# Patient Record
Sex: FEMALE | Race: WHITE | NOT HISPANIC OR LATINO | Employment: FULL TIME | URBAN - METROPOLITAN AREA
[De-identification: names, ages, dates, MRNs, and addresses within clinical notes are randomized per-mention and may not be internally consistent; named-entity substitution may affect disease eponyms.]

---

## 2017-02-15 ENCOUNTER — MYC MEDICAL ADVICE (OUTPATIENT)
Dept: FAMILY MEDICINE | Facility: CLINIC | Age: 57
End: 2017-02-15

## 2017-02-15 DIAGNOSIS — L40.0 PLAQUE PSORIASIS: ICD-10-CM

## 2017-02-15 NOTE — TELEPHONE ENCOUNTER
desonide (DESOWEN) 0.05 % ointment      Last Written Prescription Date: 6/1/2016  Last Fill Quantity: 60 g,  # refills: 3   Last Office Visit with G, P or [facility] prescribing provider: 8/16/2016                                             Clarifying with patient on medication request via mychart - per no medication by name requested    Prakash St RN

## 2017-02-15 NOTE — TELEPHONE ENCOUNTER
Routing refill request to provider for review/approval because:  -Medication strength not on [facility] refill protocol    Routing to PCP.    Dr. Rodriguez-Please sign if agree.    Thank you!  LORRAINE LuiN, RN

## 2017-02-16 RX ORDER — DESONIDE 0.5 MG/G
OINTMENT TOPICAL 2 TIMES DAILY
Qty: 60 G | Refills: 3 | Status: SHIPPED | OUTPATIENT
Start: 2017-02-16 | End: 2019-05-21

## 2017-02-20 ENCOUNTER — MYC MEDICAL ADVICE (OUTPATIENT)
Dept: FAMILY MEDICINE | Facility: CLINIC | Age: 57
End: 2017-02-20

## 2017-02-20 DIAGNOSIS — H93.90 EAR PROBLEM, UNSPECIFIED LATERALITY: Primary | ICD-10-CM

## 2017-02-20 NOTE — TELEPHONE ENCOUNTER
Dr Rodriguez would you want to see patient to direct care?  If it might be psoriasis, should she see derm rather than ENT?  Lori Bolaños RN

## 2017-02-27 DIAGNOSIS — E87.6 HYPOPOTASSEMIA: ICD-10-CM

## 2017-02-27 DIAGNOSIS — I10 HYPERTENSION GOAL BP (BLOOD PRESSURE) < 140/90: ICD-10-CM

## 2017-03-01 RX ORDER — METOPROLOL TARTRATE 50 MG
TABLET ORAL
Qty: 180 TABLET | Refills: 0 | Status: SHIPPED | OUTPATIENT
Start: 2017-03-01 | End: 2017-05-31

## 2017-03-01 RX ORDER — LISINOPRIL 40 MG/1
TABLET ORAL
Qty: 90 TABLET | Refills: 0 | Status: SHIPPED | OUTPATIENT
Start: 2017-03-01 | End: 2017-05-31

## 2017-03-01 RX ORDER — TRIAMTERENE AND HYDROCHLOROTHIAZIDE 37.5; 25 MG/1; MG/1
CAPSULE ORAL
Qty: 180 CAPSULE | Refills: 0 | Status: SHIPPED | OUTPATIENT
Start: 2017-03-01 | End: 2017-05-31

## 2017-03-01 NOTE — TELEPHONE ENCOUNTER
Prescription approved per [facility] Refill Protocol.  LORRAINE LuiN, RN      metoprolol (LOPRESSOR) 50 MG tablet      Last Written Prescription Date: 2/8/16  Last Fill Quantity: 180, # refills: 3    Last Office Visit with [facility] or [facility] prescribing provider:  8/16/16 with Dr. Wegener   Future Office Visit:        BP Readings from Last 3 Encounters:   08/16/16 134/74   05/02/16 106/71   02/08/16 124/80     lisinopril (PRINIVIL,ZESTRIL) 40 MG tablet/ triamterene-hydrochlorothiazide (DYAZIDE) 37.5-25 MG per capsule  Last Written Prescription Date: 2/8/16  Last Fill Quantity: 90/180, # refills: 3  Last Office Visit with [facility] or [facility] prescribing provider: 8/16/16       Potassium   Date Value Ref Range Status   08/16/2016 3.5 3.4 - 5.3 mmol/L Final     Creatinine   Date Value Ref Range Status   08/16/2016 0.76 0.52 - 1.04 mg/dL Final     BP Readings from Last 3 Encounters:   08/16/16 134/74   05/02/16 106/71   02/08/16 124/80

## 2017-03-24 DIAGNOSIS — Z13.6 CARDIOVASCULAR SCREENING; LDL GOAL LESS THAN 100: ICD-10-CM

## 2017-03-24 NOTE — TELEPHONE ENCOUNTER
Medication Detail      Disp Refills Start End LAY   simvastatin (ZOCOR) 40 MG tablet 90 tablet 3 2/8/2016  No   Sig: Take 1 tablet (40 mg) by mouth At Bedtime   Class: E-Prescribe   Route: Oral       Last Office Visit with [facility], P or [facility] prescribing provider: 8/16/2016       Lab Results   Component Value Date    CHOL 238 11/19/2015     Lab Results   Component Value Date    HDL 83 11/19/2015     Lab Results   Component Value Date     11/19/2015     Lab Results   Component Value Date    TRIG 178 11/19/2015     Lab Results   Component Value Date    CHOLHDLRATIO 3.3 05/15/2014

## 2017-03-27 RX ORDER — SIMVASTATIN 40 MG
TABLET ORAL
Qty: 90 TABLET | Refills: 0 | Status: SHIPPED | OUTPATIENT
Start: 2017-03-27 | End: 2017-06-20

## 2017-03-27 NOTE — TELEPHONE ENCOUNTER
Routing refill request to provider for review/approval because:  --Labs not current:  Last lipids 11/19/15.  --MyChart reminder sent to patient today.    Colette Robb RN

## 2017-04-13 DIAGNOSIS — Z13.6 CARDIOVASCULAR SCREENING; LDL GOAL LESS THAN 100: ICD-10-CM

## 2017-04-13 PROCEDURE — 36415 COLL VENOUS BLD VENIPUNCTURE: CPT | Performed by: FAMILY MEDICINE

## 2017-04-13 PROCEDURE — 80061 LIPID PANEL: CPT | Performed by: FAMILY MEDICINE

## 2017-04-14 LAB
CHOLEST SERPL-MCNC: 229 MG/DL
HDLC SERPL-MCNC: 80 MG/DL
LDLC SERPL CALC-MCNC: 118 MG/DL
NONHDLC SERPL-MCNC: 149 MG/DL
TRIGL SERPL-MCNC: 154 MG/DL

## 2017-04-20 NOTE — PROGRESS NOTES
The results of your recent lipid (cholesterol) profile were abnormal.    Lab Results       Component                Value               Date                       LDL                      118                 04/13/2017            Lab Results       Component                Value               Date                       TRIG                     154                 04/13/2017            Lab Results       Component                Value               Date                       CHOLHDLRATIO             3.3                 05/15/2014              Desired or goal levels are:  CHOLESTEROL: Desirable is less than 200.   HDL (Good Cholesterol): Desirable is greater than 40 (for men) greater than 50 (for women).  LDL (Bad Cholesterol): Desirable is less than 130 (or less than 100 if you have heart disease or diabetes). Borderline 130-160.  TRIGLYCERIDES: Desirable is less than 150.  Borderline is 150-200.    For high triglycerides, reduce the intake of jam, jelly, honey, alcohol, and/or coffee creamers.    As you may know, an elevated cholesterol is one factor that increases your risk for heart disease and stroke. You can improve your cholesterol by controlling the amount and type of fat you eat and by increasing your daily activity level.    Here are some ways to improve your nutrition:  Eat less fat (especially butter, Crisco and other saturated fats)  Buy lean cuts of meat, reduce your portions of red meat or substitute poultry or fish  Use skim milk and low-fat dairy products  Eat no more than 4 egg yolks per week  Avoid fried or fast foods that are high in fat  Eat more fruits and vegetables      Also consider starting or increasing your aerobic activity. Aerobic activity is the best way to improve HDL (good) cholesterol. If this would be new to you, please talk with me first about what activities are safe for you.      Please feel free to contact us with any questions or if you would like more information.      Stacia  LAZARUS Rodriguez

## 2017-06-20 ENCOUNTER — OFFICE VISIT (OUTPATIENT)
Dept: FAMILY MEDICINE | Facility: CLINIC | Age: 57
End: 2017-06-20
Payer: COMMERCIAL

## 2017-06-20 VITALS
DIASTOLIC BLOOD PRESSURE: 62 MMHG | SYSTOLIC BLOOD PRESSURE: 106 MMHG | BODY MASS INDEX: 32.6 KG/M2 | RESPIRATION RATE: 16 BRPM | HEIGHT: 63 IN | HEART RATE: 82 BPM | WEIGHT: 184 LBS | OXYGEN SATURATION: 98 % | TEMPERATURE: 98.4 F

## 2017-06-20 DIAGNOSIS — F43.23 ADJUSTMENT DISORDER WITH MIXED ANXIETY AND DEPRESSED MOOD: ICD-10-CM

## 2017-06-20 DIAGNOSIS — F42.9 OCD (OBSESSIVE COMPULSIVE DISORDER): ICD-10-CM

## 2017-06-20 DIAGNOSIS — E78.00 HYPERCHOLESTEREMIA: ICD-10-CM

## 2017-06-20 DIAGNOSIS — I10 HYPERTENSION GOAL BP (BLOOD PRESSURE) < 140/90: Primary | ICD-10-CM

## 2017-06-20 DIAGNOSIS — E87.6 HYPOPOTASSEMIA: ICD-10-CM

## 2017-06-20 DIAGNOSIS — Z12.11 COLON CANCER SCREENING: ICD-10-CM

## 2017-06-20 PROCEDURE — 80048 BASIC METABOLIC PNL TOTAL CA: CPT | Performed by: FAMILY MEDICINE

## 2017-06-20 PROCEDURE — 99214 OFFICE O/P EST MOD 30 MIN: CPT | Performed by: FAMILY MEDICINE

## 2017-06-20 PROCEDURE — 36415 COLL VENOUS BLD VENIPUNCTURE: CPT | Performed by: FAMILY MEDICINE

## 2017-06-20 RX ORDER — BUPROPION HYDROCHLORIDE 100 MG/1
TABLET ORAL DAILY
Qty: 90 TABLET | Status: CANCELLED | OUTPATIENT
Start: 2017-06-20

## 2017-06-20 RX ORDER — SIMVASTATIN 40 MG
TABLET ORAL
Qty: 90 TABLET | Refills: 3 | Status: SHIPPED | OUTPATIENT
Start: 2017-06-20 | End: 2017-06-20

## 2017-06-20 RX ORDER — TRIAMTERENE AND HYDROCHLOROTHIAZIDE 37.5; 25 MG/1; MG/1
CAPSULE ORAL
Qty: 180 CAPSULE | Refills: 3 | Status: SHIPPED | OUTPATIENT
Start: 2017-06-20 | End: 2018-03-22

## 2017-06-20 RX ORDER — LISINOPRIL 40 MG/1
40 TABLET ORAL DAILY
Qty: 90 TABLET | Refills: 3 | Status: SHIPPED | OUTPATIENT
Start: 2017-06-20 | End: 2018-03-22

## 2017-06-20 RX ORDER — METOPROLOL TARTRATE 50 MG
50 TABLET ORAL 2 TIMES DAILY
Qty: 90 TABLET | Refills: 3 | Status: SHIPPED | OUTPATIENT
Start: 2017-06-20 | End: 2018-01-01

## 2017-06-20 RX ORDER — BUPROPION HYDROCHLORIDE 150 MG/1
TABLET ORAL
Qty: 30 TABLET | Status: CANCELLED | OUTPATIENT
Start: 2017-06-20

## 2017-06-20 ASSESSMENT — ANXIETY QUESTIONNAIRES
IF YOU CHECKED OFF ANY PROBLEMS ON THIS QUESTIONNAIRE, HOW DIFFICULT HAVE THESE PROBLEMS MADE IT FOR YOU TO DO YOUR WORK, TAKE CARE OF THINGS AT HOME, OR GET ALONG WITH OTHER PEOPLE: SOMEWHAT DIFFICULT
1. FEELING NERVOUS, ANXIOUS, OR ON EDGE: NEARLY EVERY DAY
7. FEELING AFRAID AS IF SOMETHING AWFUL MIGHT HAPPEN: NOT AT ALL
5. BEING SO RESTLESS THAT IT IS HARD TO SIT STILL: SEVERAL DAYS
2. NOT BEING ABLE TO STOP OR CONTROL WORRYING: MORE THAN HALF THE DAYS
6. BECOMING EASILY ANNOYED OR IRRITABLE: MORE THAN HALF THE DAYS
3. WORRYING TOO MUCH ABOUT DIFFERENT THINGS: SEVERAL DAYS
GAD7 TOTAL SCORE: 10

## 2017-06-20 ASSESSMENT — PATIENT HEALTH QUESTIONNAIRE - PHQ9: 5. POOR APPETITE OR OVEREATING: SEVERAL DAYS

## 2017-06-20 NOTE — MR AVS SNAPSHOT
After Visit Summary   6/20/2017    Maggy Goldberg    MRN: [id number]           Patient Information     Date Of Birth          1960        Visit Information        Provider Department      6/20/2017 1:00 PM Stacia Rodriguez MD [facility]        Today's Diagnoses     Hypertension goal BP (blood pressure) < 140/90    -  1    Adjustment disorder with mixed anxiety and depressed mood        OCD (obsessive compulsive disorder)        Hypopotassemia        Colon cancer screening        Hypercholesteremia          Care Instructions    1. Schedule your mammogram.  2. Complete your FIT test.  3. You can try stopping your simvastatin and return in 3 months for a fasting lipid test.               Follow-ups after your visit        Future tests that were ordered for you today     Open Future Orders        Priority Expected Expires Ordered    Lipid panel reflex to direct LDL Routine 6/21/2017 6/20/2018 6/20/2017    Fecal colorectal cancer screen FIT Routine 7/11/2017 9/12/2017 6/20/2017            Who to contact     If you have questions or need follow up information about today's clinic visit or your schedule please contact [facility] directly at [phone].  Normal or non-critical lab and imaging results will be communicated to you by Dexterrahart, letter or phone within 4 business days after the clinic has received the results. If you do not hear from us within 7 days, please contact the clinic through [email or web address] or phone. If you have a critical or abnormal lab result, we will notify you by phone as soon as possible.  Submit refill requests through Nimaya or call your pharmacy and they will forward the refill request to us. Please allow 3 business days for your refill to be completed.          Additional Information About Your Visit        Dexterrahart Information     Nimaya gives you secure access to your electronic health record. If you see a primary care provider, you can also send  "messages to your care team and make appointments. If you have questions, please call your primary care clinic.  If you do not have a primary care provider, please call [phone] and they will assist you.        Care EveryWhere ID     This is your Care EveryWhere ID. This could be used by other organizations to access your [facility] medical records  [id number]        Your Vitals Were     Pulse Temperature Respirations Height Last Period Pulse Oximetry    82 98.4  F (36.9  C) (Oral) 16 5' 3\" (1.6 m) 10/19/2010 98%    BMI (Body Mass Index)                   32.59 kg/m2            Blood Pressure from Last 3 Encounters:   06/20/17 106/62   08/16/16 134/74   05/02/16 106/71    Weight from Last 3 Encounters:   06/20/17 184 lb (83.5 kg)   08/16/16 197 lb (89.4 kg)   05/02/16 199 lb (90.3 kg)              We Performed the Following     Basic metabolic panel  (Ca, Cl, CO2, Creat, Gluc, K, Na, BUN)          Today's Medication Changes          These changes are accurate as of: 6/20/17  1:35 PM.  If you have any questions, ask your nurse or doctor.               These medicines have changed or have updated prescriptions.        Dose/Directions    lisinopril 40 MG tablet   Commonly known as:  PRINIVIL/ZESTRIL   This may have changed:  See the new instructions.   Used for:  Hypertension goal BP (blood pressure) < 140/90   Changed by:  Stacia Rodriguez MD        Dose:  40 mg   Take 1 tablet (40 mg) by mouth daily   Quantity:  90 tablet   Refills:  3       metoprolol 50 MG tablet   Commonly known as:  LOPRESSOR   This may have changed:  See the new instructions.   Used for:  Hypertension goal BP (blood pressure) < 140/90   Changed by:  Stacia Rodriguez MD        Dose:  50 mg   Take 1 tablet (50 mg) by mouth 2 times daily   Quantity:  90 tablet   Refills:  3       triamterene-hydrochlorothiazide 37.5-25 MG per capsule   Commonly known as:  DYAZIDE   This may have changed:  See the new instructions.   Used for:  Hypertension goal " BP (blood pressure) < 140/90, Hypopotassemia   Changed by:  Stacia Rodriguez MD        TAKE TWO CAPSULES BY MOUTH EVERY DAY   Quantity:  180 capsule   Refills:  3         Stop taking these medicines if you haven't already. Please contact your care team if you have questions.     simvastatin 40 MG tablet   Commonly known as:  ZOCOR   Stopped by:  Stacia Rodriguez MD                Where to get your medicines      These medications were sent to [facility] [address]  [address] [facility] [address]     Phone:  [phone]     lisinopril 40 MG tablet    metoprolol 50 MG tablet    triamterene-hydrochlorothiazide 37.5-25 MG per capsule                Primary Care Provider Office Phone # Fax #    Stacia Rodriguez [phone]       [facility] [address]  [facility] [address]        Thank you!     Thank you for choosing [facility]  for your care. Our goal is always to provide you with excellent care. Hearing back from our patients is one way we can continue to improve our services. Please take a few minutes to complete the written survey that you may receive in the mail after your visit with us. Thank you!             Your Updated Medication List - Protect others around you: Learn how to safely use, store and throw away your medicines at www.disposemymeds.org.          This list is accurate as of: 6/20/17  1:35 PM.  Always use your most recent med list.                   Brand Name Dispense Instructions for use    aspirin 325 MG tablet      one daily       blood glucose monitoring lancets     100 each    1 lancet daily.       * blood glucose monitoring test strip    no brand specified    1 Box    1 strip by In Vitro route daily.       * blood glucose monitoring test strip    ACCU-CHEK SMARTVIEW    100 strip    To check glucose once daily or provide patient with strips and meter covered under her insurance.       clobetasol 0.05 %  ointment    TEMOVATE    60 g    Apply  topically 2 times daily.       clonazePAM 1 MG tablet    klonoPIN     Take 1 mg by mouth 2 times daily       * desonide 0.05 % ointment    DESOWEN    30 g    Apply topically 2 times daily       * desonide 0.05 % ointment    DESOWEN    60 g    Apply topically 2 times daily       lisinopril 40 MG tablet    PRINIVIL/ZESTRIL    90 tablet    Take 1 tablet (40 mg) by mouth daily       metoprolol 50 MG tablet    LOPRESSOR    90 tablet    Take 1 tablet (50 mg) by mouth 2 times daily       OMEGA-3 FISH OIL PO      Take 1 g by mouth       order for DME     1 Device    1 Device continuous prn (and wear at HS also). One medium B&C thumbkeeper       psyllium 0.52 G capsule      Take 1 capsule by mouth daily.       triamterene-hydrochlorothiazide 37.5-25 MG per capsule    DYAZIDE    180 capsule    TAKE TWO CAPSULES BY MOUTH EVERY DAY       * WELLBUTRIN 100 MG tablet   Generic drug:  buPROPion      Take by mouth daily       * buPROPion 150 MG 24 hr tablet    WELLBUTRIN XL         ZOLOFT PO      Take 200 mg by mouth daily       * Notice:  This list has 6 medication(s) that are the same as other medications prescribed for you. Read the directions carefully, and ask your doctor or other care provider to review them with you.

## 2017-06-20 NOTE — NURSING NOTE
"Chief Complaint   Patient presents with     Recheck Medication       Initial /62  Pulse 82  Temp 98.4  F (36.9  C) (Oral)  Resp 16  Ht 5' 3\" (1.6 m)  Wt 184 lb (83.5 kg)  LMP 10/19/2010  SpO2 98%  BMI 32.59 kg/m2 Estimated body mass index is 32.59 kg/(m^2) as calculated from the following:    Height as of this encounter: 5' 3\" (1.6 m).    Weight as of this encounter: 184 lb (83.5 kg).  Medication Reconciliation: complete     /Marta Gomez MA     "

## 2017-06-20 NOTE — PATIENT INSTRUCTIONS
1. Schedule your mammogram.  2. Complete your FIT test.  3. You can try stopping your simvastatin and return in 3 months for a fasting lipid test.

## 2017-06-20 NOTE — PROGRESS NOTES
SUBJECTIVE:                                                    Maggy Goldberg is a 57 year old female who presents to clinic today for the following health issues:    Hyperlipidemia Follow-Up      Rate your low fat/cholesterol diet?: fair    Taking statin?  Yes, no muscle aches from statin    Other lipid medications/supplements?:  none     Hypertension Follow-up      Outpatient blood pressures are not being checked.    Low Salt Diet: low salt       Patient does not feel her BP is dropping too low.    Her mental health hasn't been real good. She has missed the last five weeks of work. Patient is seeing her psychiatrist. No med changes. Situational related to her job, but patient feels too old to get a new job. Feels she has adequate support.        Wt Readings from Last 5 Encounters:   06/20/17 184 lb (83.5 kg)   08/16/16 197 lb (89.4 kg)   05/02/16 199 lb (90.3 kg)   02/08/16 208 lb (94.3 kg)   01/14/16 202 lb 12 oz (92 kg)     Problem list and histories reviewed & adjusted, as indicated.  Additional history: as documented  Patient Active Problem List   Diagnosis     Adjustment disorder with mixed anxiety and depressed mood     Contact dermatitis and other eczema, due to unspecified cause     Allergic rhinitis     CARDIOVASCULAR SCREENING; LDL GOAL LESS THAN 100     CMC arthritis, thumb, degenerative     OCD (obsessive compulsive disorder)     Hypertension goal BP (blood pressure) < 140/90     Atypical nevi     Psoriasis     Hypercholesteremia     Lentigo     Plaque psoriasis     Lipoma of lower extremity, unspecified laterality     Achrochordon     Dermatofibroma     Skin cancer screening     History reviewed. No pertinent surgical history.    Social History   Substance Use Topics     Smoking status: Current Some Day Smoker     Types: Cigarettes     Smokeless tobacco: Never Used      Comment: 1/2 PPD     Alcohol use Yes      Comment: a couple per day     Family History   Problem Relation Age of Onset     CANCER  Mother      skin cancer     Depression Mother      OSTEOPOROSIS Mother      Hypertension Father      CANCER Father       of lung cancer     Obesity Father      DIABETES Maternal Grandmother      Alcohol/Drug Paternal Grandfather      alcohol     Asthma Maternal Uncle      Blood Disease Son      kasandra nassar     CANCER Maternal Aunt      not sure what     CANCER Maternal Aunt      not sure what     Depression Brother      Thyroid Disease Paternal Uncle          Current Outpatient Prescriptions   Medication Sig Dispense Refill     lisinopril (PRINIVIL/ZESTRIL) 40 MG tablet Take 1 tablet (40 mg) by mouth daily 90 tablet 3     triamterene-hydrochlorothiazide (DYAZIDE) 37.5-25 MG per capsule TAKE TWO CAPSULES BY MOUTH EVERY  capsule 3     metoprolol (LOPRESSOR) 50 MG tablet Take 1 tablet (50 mg) by mouth 2 times daily 90 tablet 3     desonide (DESOWEN) 0.05 % ointment Apply topically 2 times daily 60 g 3     buPROPion (WELLBUTRIN XL) 150 MG 24 hr tablet        buPROPion (WELLBUTRIN) 100 MG tablet Take by mouth daily       clonazePAM (KLONOPIN) 1 MG tablet Take 1 mg by mouth 2 times daily       Sertraline HCl (ZOLOFT PO) Take 200 mg by mouth daily       desonide (DESOWEN) 0.05 % ointment Apply topically 2 times daily 30 g 0     psyllium 0.52 GM capsule Take 1 capsule by mouth daily.       ASPIRIN 325 MG OR TABS one daily       [DISCONTINUED] lisinopril (PRINIVIL/ZESTRIL) 40 MG tablet TAKE ONE TABLET BY MOUTH EVERY DAY 30 tablet 0     [DISCONTINUED] triamterene-hydrochlorothiazide (DYAZIDE) 37.5-25 MG per capsule TAKE TWO CAPSULES BY MOUTH EVERY DAY 60 capsule 0     [DISCONTINUED] metoprolol (LOPRESSOR) 50 MG tablet TAKE ONE TABLET BY MOUTH TWICE A DAY 60 tablet 0     Omega-3 Fatty Acids (OMEGA-3 FISH OIL PO) Take 1 g by mouth       clobetasol (TEMOVATE) 0.05 % ointment Apply  topically 2 times daily. (Patient not taking: Reported on 2017) 60 g 0     ACCU-CHEK FASTCLIX LANCETS MISC 1 lancet daily.  (Patient not taking: Reported on 6/20/2017) 100 each prn     glucose blood VI test strips (ACCU-CHEK SMARTVIEW) strip To check glucose once daily or provide patient with strips and meter covered under her insurance. (Patient not taking: Reported on 6/20/2017) 100 strip prn     glucose blood VI test strips strip 1 strip by In Vitro route daily. (Patient not taking: Reported on 6/20/2017) 1 Box 12     ORDER FOR DME 1 Device continuous prn (and wear at HS also). One medium B&C thumbkeeper (Patient not taking: Reported on 6/20/2017) 1 Device 0     Allergies   Allergen Reactions     Pertussis Vaccine      Amoxicillin Rash     Clindamycin Rash     Recent Labs   Lab Test  04/13/17   1227  08/16/16   1441  05/02/16   1738  11/19/15   0953   10/07/14   1611  05/15/14   0905  02/24/14   1217   06/04/13   0851   10/21/11   0950   A1C   --    --    --   6.0   --   6.1*   --   6.2*   < >   --    < >  6.3*   LDL  118*   --    --   119*   --    --   98  127   --   69   < >   --    HDL  80   --    --   83   --    --   61  59   --   54   < >   --    TRIG  154*   --    --   178*   --    --   201*  228*   --   333*   < >   --    ALT   --   28   --    --    --    --    --   39   --   52*   < >   --    CR   --   0.76  0.79  0.81   < >  0.73   --   0.63   --   0.53   < >   --    GFRESTIMATED   --   79  75  73   < >  83   --   >90   --   >90   < >   --    GFRESTBLACK   --   >90   GFR Calc    >90   GFR Calc    89   < >  >90   GFR Calc     --   >90   --   >90   < >   --    POTASSIUM   --   3.5  4.1  4.2   < >  3.2*   --   3.4   < >  3.2*   < >   --    TSH   --    --    --    --    --    --    --   1.92   --    --    --   1.23    < > = values in this interval not displayed.      BP Readings from Last 3 Encounters:   06/20/17 106/62   08/16/16 134/74   05/02/16 106/71    Wt Readings from Last 3 Encounters:   06/20/17 184 lb (83.5 kg)   08/16/16 197 lb (89.4 kg)   05/02/16 199 lb (90.3 kg)     "    Reviewed and updated as needed this visit by clinical staff  Reviewed and updated as needed this visit by Provider    ROS:  Denies lightheadedness, chest pain, SOB.    This document serves as a record of the services and decisions personally performed and made by Stacia Rodriguez MD. It was created on his/her behalf by Jaymie Buenrostro, trained medical scribe. The creation of this document is based the provider's statements to the medical scribes.    Scribe Jaymie Buenrostro, June 20, 2017  OBJECTIVE:                                                    /62  Pulse 82  Temp 98.4  F (36.9  C) (Oral)  Resp 16  Ht 5' 3\" (1.6 m)  Wt 184 lb (83.5 kg)  LMP 10/19/2010  SpO2 98%  BMI 32.59 kg/m2  Body mass index is 32.59 kg/(m^2).   Wt Readings from Last 5 Encounters:   06/20/17 184 lb (83.5 kg)   08/16/16 197 lb (89.4 kg)   05/02/16 199 lb (90.3 kg)   02/08/16 208 lb (94.3 kg)   01/14/16 202 lb 12 oz (92 kg)      GENERAL: healthy, alert and no distress  PSYCH: mentation appears normal, affect normal/bright    Diagnostic Test Results:  none     The 10-year ASCVD risk score ([place] Jr, et al., 2013) is: 4.6%    Values used to calculate the score:      Age: 57 years      Sex: Female      Is Non- : No      Diabetic: No      Tobacco smoker: Yes      Systolic Blood Pressure: 106 mmHg      Is BP treated: Yes      HDL Cholesterol: 80 mg/dL      Total Cholesterol: 229 mg/dL      ASSESSMENT/PLAN:                                                    1. Hypertension goal BP (blood pressure) < 140/90  Controlled, no changes   - Basic metabolic panel  (Ca, Cl, CO2, Creat, Gluc, K, Na, BUN)  - lisinopril (PRINIVIL/ZESTRIL) 40 MG tablet; Take 1 tablet (40 mg) by mouth daily  Dispense: 90 tablet; Refill: 3  - triamterene-hydrochlorothiazide (DYAZIDE) 37.5-25 MG per capsule; TAKE TWO CAPSULES BY MOUTH EVERY DAY  Dispense: 180 capsule; Refill: 3  - metoprolol (LOPRESSOR) 50 MG tablet; Take 1 tablet (50 mg) " by mouth 2 times daily  Dispense: 90 tablet; Refill: 3    2. Adjustment disorder with mixed anxiety and depressed mood  Followed by psychiatry    3. OCD (obsessive compulsive disorder)  Followed by psychiatry     4. Hypopotassemia  Continues on dyazide   - triamterene-hydrochlorothiazide (DYAZIDE) 37.5-25 MG per capsule; TAKE TWO CAPSULES BY MOUTH EVERY DAY  Dispense: 180 capsule; Refill: 3    5. hyperlipidemia   ascvd risk score under 5%. Maggy would like to see how this looks after being off the simvastatin (hopes to reduce medications). If stable, would be okay for now to continue off simvastatin. Check lipids in three months.        6. Colon cancer screening     - Fecal colorectal cancer screen FIT; Future    Patient Instructions   1. Schedule your mammogram.  2. Complete your FIT test.  3. You can try stopping your simvastatin and return in 3 months for a fasting lipid test.           The information in this document, created by the medical scribe for me, accurately reflects the services I personally performed and the decisions made by me. I have reviewed and approved this document for accuracy. 06/20/17    Stacia Rodriguez MD  [facility]

## 2017-06-21 LAB
ANION GAP SERPL CALCULATED.3IONS-SCNC: 11 MMOL/L (ref 3–14)
BUN SERPL-MCNC: 21 MG/DL (ref 7–30)
CALCIUM SERPL-MCNC: 9.1 MG/DL (ref 8.5–10.1)
CHLORIDE SERPL-SCNC: 105 MMOL/L (ref 94–109)
CO2 SERPL-SCNC: 22 MMOL/L (ref 20–32)
CREAT SERPL-MCNC: 0.95 MG/DL (ref 0.52–1.04)
GFR SERPL CREATININE-BSD FRML MDRD: 60 ML/MIN/1.7M2
GLUCOSE SERPL-MCNC: 105 MG/DL (ref 70–99)
POTASSIUM SERPL-SCNC: 3.9 MMOL/L (ref 3.4–5.3)
SODIUM SERPL-SCNC: 138 MMOL/L (ref 133–144)

## 2017-06-21 ASSESSMENT — PATIENT HEALTH QUESTIONNAIRE - PHQ9: SUM OF ALL RESPONSES TO PHQ QUESTIONS 1-9: 8

## 2017-06-21 ASSESSMENT — ANXIETY QUESTIONNAIRES: GAD7 TOTAL SCORE: 10

## 2017-08-01 ENCOUNTER — OFFICE VISIT (OUTPATIENT)
Dept: FAMILY MEDICINE | Facility: CLINIC | Age: 57
End: 2017-08-01
Payer: COMMERCIAL

## 2017-08-01 ENCOUNTER — TELEPHONE (OUTPATIENT)
Dept: FAMILY MEDICINE | Facility: CLINIC | Age: 57
End: 2017-08-01

## 2017-08-01 VITALS
WEIGHT: 180 LBS | TEMPERATURE: 98 F | SYSTOLIC BLOOD PRESSURE: 89 MMHG | HEART RATE: 81 BPM | BODY MASS INDEX: 31.89 KG/M2 | OXYGEN SATURATION: 97 % | RESPIRATION RATE: 16 BRPM | DIASTOLIC BLOOD PRESSURE: 54 MMHG

## 2017-08-01 DIAGNOSIS — R10.32 LLQ ABDOMINAL PAIN: Primary | ICD-10-CM

## 2017-08-01 DIAGNOSIS — K57.32 DIVERTICULITIS OF COLON: ICD-10-CM

## 2017-08-01 DIAGNOSIS — Z12.31 ENCOUNTER FOR SCREENING MAMMOGRAM FOR BREAST CANCER: ICD-10-CM

## 2017-08-01 DIAGNOSIS — Z12.11 COLON CANCER SCREENING: ICD-10-CM

## 2017-08-01 DIAGNOSIS — R19.7 DIARRHEA, UNSPECIFIED TYPE: ICD-10-CM

## 2017-08-01 LAB
BASOPHILS # BLD AUTO: 0 10E9/L (ref 0–0.2)
BASOPHILS NFR BLD AUTO: 0.4 %
DIFFERENTIAL METHOD BLD: ABNORMAL
EOSINOPHIL # BLD AUTO: 0.1 10E9/L (ref 0–0.7)
EOSINOPHIL NFR BLD AUTO: 1.3 %
ERYTHROCYTE [DISTWIDTH] IN BLOOD BY AUTOMATED COUNT: 13 % (ref 10–15)
HCT VFR BLD AUTO: 36 % (ref 35–47)
HGB BLD-MCNC: 12.5 G/DL (ref 11.7–15.7)
LYMPHOCYTES # BLD AUTO: 1.3 10E9/L (ref 0.8–5.3)
LYMPHOCYTES NFR BLD AUTO: 15 %
MCH RBC QN AUTO: 34 PG (ref 26.5–33)
MCHC RBC AUTO-ENTMCNC: 34.7 G/DL (ref 31.5–36.5)
MCV RBC AUTO: 98 FL (ref 78–100)
MONOCYTES # BLD AUTO: 0.6 10E9/L (ref 0–1.3)
MONOCYTES NFR BLD AUTO: 6.9 %
NEUTROPHILS # BLD AUTO: 6.5 10E9/L (ref 1.6–8.3)
NEUTROPHILS NFR BLD AUTO: 76.4 %
PLATELET # BLD AUTO: 222 10E9/L (ref 150–450)
RBC # BLD AUTO: 3.68 10E12/L (ref 3.8–5.2)
WBC # BLD AUTO: 8.5 10E9/L (ref 4–11)

## 2017-08-01 PROCEDURE — 80053 COMPREHEN METABOLIC PANEL: CPT | Performed by: FAMILY MEDICINE

## 2017-08-01 PROCEDURE — 36415 COLL VENOUS BLD VENIPUNCTURE: CPT | Performed by: FAMILY MEDICINE

## 2017-08-01 PROCEDURE — 99214 OFFICE O/P EST MOD 30 MIN: CPT | Performed by: FAMILY MEDICINE

## 2017-08-01 PROCEDURE — 85025 COMPLETE CBC W/AUTO DIFF WBC: CPT | Performed by: FAMILY MEDICINE

## 2017-08-01 NOTE — LETTER
[facility]  [address] [address]  Phone: [phone]    August 1, 2017        Maggy Goldberg  [address] [facility] [address]          To whom it may concern:    RE: Maggy Winter was seen and treated today at our clinic and missed work due to illness. She may return to work when she is feeling better, likely in a few days.     Please contact me for questions or concerns.      Sincerely,        Stacia Rodriguez MD

## 2017-08-01 NOTE — MR AVS SNAPSHOT
After Visit Summary   8/1/2017    Maggy Goldberg    MRN: [id number]           Patient Information     Date Of Birth          1960        Visit Information        Provider Department      8/1/2017 3:40 PM Stacia Rodriguez MD [facility]        Today's Diagnoses     LLQ abdominal pain    -  1    Diarrhea, unspecified type        Colon cancer screening        Encounter for screening mammogram for breast cancer        Diverticulitis of colon          Care Instructions    1) Schedule your mammogram  2) Complete your FIT test once your stools have returned to normal and you are feeling better  3) Follow a clear liquid diet  4) We will try and schedule abdominal CT today (you prefer tomorrow--go to the ER if more severe pain)   5) In the meantime if you have severe pain, go to the ER.          Follow-ups after your visit        Future tests that were ordered for you today     Open Future Orders        Priority Expected Expires Ordered    CT Abdomen Pelvis w Contrast Routine  8/1/2018 8/1/2017    MA Screening Digital Bilateral Routine  8/1/2018 8/1/2017    Fecal colorectal cancer screen FIT Routine 8/22/2017 10/24/2017 8/1/2017            Who to contact     If you have questions or need follow up information about today's clinic visit or your schedule please contact [facility] directly at [phone].  Normal or non-critical lab and imaging results will be communicated to you by MyChart, letter or phone within 4 business days after the clinic has received the results. If you do not hear from us within 7 days, please contact the clinic through MyChart or phone. If you have a critical or abnormal lab result, we will notify you by phone as soon as possible.  Submit refill requests through [employer] or call your pharmacy and they will forward the refill request to us. Please allow 3 business days for your refill to be completed.          Additional Information About Your Visit         [employer] Information     [employer] gives you secure access to your electronic health record. If you see a primary care provider, you can also send messages to your care team and make appointments. If you have questions, please call your primary care clinic.  If you do not have a primary care provider, please call [phone] and they will assist you.        Care EveryWhere ID     This is your Care EveryWhere ID. This could be used by other organizations to access your [facility] medical records  [id number]        Your Vitals Were     Pulse Temperature Respirations Last Period Pulse Oximetry BMI (Body Mass Index)    81 98  F (36.7  C) (Oral) 16 10/19/2010 97% 31.89 kg/m2       Blood Pressure from Last 3 Encounters:   08/01/17 (!) 89/54   06/20/17 106/62   08/16/16 134/74    Weight from Last 3 Encounters:   08/01/17 180 lb (81.6 kg)   06/20/17 184 lb (83.5 kg)   08/16/16 197 lb (89.4 kg)              We Performed the Following     CBC with platelets differential     Comprehensive metabolic panel        Primary Care Provider Office Phone # Fax #    Stacia Rodriguez [phone]       [facility] [address]  [facility] [address]        Equal Access to Services     ROMAN ARSHAD : Hadii aad ku hadasho Soomaali, waaxda luqadaha, qaybta kaalmada adeegyada, waxay idiin hayrozinan theresa macedo. So [facility] [phone].    ATENCIÓN: Si habla español, tiene a chatterjee disposición servicios gratuitos de asistencia lingüística. Llame al [phone].    We comply with applicable federal civil rights laws and [place] laws. We do not discriminate on the basis of race, color, national origin, age, disability sex, sexual orientation or gender identity.            Thank you!     Thank you for choosing [facility]  for your care. Our goal is always to provide you with excellent care. Hearing back from our patients is one way we can continue to improve our services. Please take a few minutes to  complete the written survey that you may receive in the mail after your visit with us. Thank you!             Your Updated Medication List - Protect others around you: Learn how to safely use, store and throw away your medicines at www.disposemymeds.org.          This list is accurate as of: 8/1/17  4:31 PM.  Always use your most recent med list.                   Brand Name Dispense Instructions for use Diagnosis    aspirin 325 MG tablet      one daily        blood glucose monitoring lancets     100 each    1 lancet daily.    DM (diabetes mellitus) (H)       * blood glucose monitoring test strip    no brand specified    1 Box    1 strip by In Vitro route daily.    Type II or unspecified type diabetes mellitus without mention of complication, not stated as uncontrolled       * blood glucose monitoring test strip    ACCU-CHEK SMARTVIEW    100 strip    To check glucose once daily or provide patient with strips and meter covered under her insurance.    DM (diabetes mellitus) (H)       clobetasol 0.05 % ointment    TEMOVATE    60 g    Apply  topically 2 times daily.    Psoriasis       clonazePAM 1 MG tablet    klonoPIN     Take 1 mg by mouth 2 times daily        * desonide 0.05 % ointment    DESOWEN    30 g    Apply topically 2 times daily    Psoriasis       * desonide 0.05 % ointment    DESOWEN    60 g    Apply topically 2 times daily    Plaque psoriasis       lisinopril 40 MG tablet    PRINIVIL/ZESTRIL    90 tablet    Take 1 tablet (40 mg) by mouth daily    Hypertension goal BP (blood pressure) < 140/90       metoprolol 50 MG tablet    LOPRESSOR    90 tablet    Take 1 tablet (50 mg) by mouth 2 times daily    Hypertension goal BP (blood pressure) < 140/90       OMEGA-3 FISH OIL PO      Take 1 g by mouth        order for DME     1 Device    1 Device continuous prn (and wear at HS also). One medium B&C thumbkeeper    Hand pain       psyllium 0.52 G capsule      Take 1 capsule by mouth daily.         triamterene-hydrochlorothiazide 37.5-25 MG per capsule    DYAZIDE    180 capsule    TAKE TWO CAPSULES BY MOUTH EVERY DAY    Hypertension goal BP (blood pressure) < 140/90, Hypopotassemia       * WELLBUTRIN 100 MG tablet   Generic drug:  buPROPion      Take by mouth daily        * buPROPion 150 MG 24 hr tablet    WELLBUTRIN XL          ZOLOFT PO      Take 200 mg by mouth daily        * Notice:  This list has 6 medication(s) that are the same as other medications prescribed for you. Read the directions carefully, and ask your doctor or other care provider to review them with you.

## 2017-08-01 NOTE — TELEPHONE ENCOUNTER
"Maggy Goldberg is a 57 year old female who calls with abdominal pain.    NURSING ASSESSMENT:  Description:  Patient calling requesting triage - concern for diverticulitis flair - states she had diagnosis with CT scan 8/16/2016  Onset/duration:  7/27/2017  Precip. factors:  Hx diverticulitis  Associated symptoms:    1. Abdominal pain - LLQ - 6/10 - feels like \"nawing, and bloating/gas\" - been persistent over the last 7 days - not worsening - loose stools 6 x per day, sweats, small amount of nausea  -   - denies black/blody tarry stools, weakness, dizziness, vomiting, dehydration      Last exam/Treatment:  6/20/2017  Allergies:   Allergies   Allergen Reactions     Pertussis Vaccine      Amoxicillin Rash     Clindamycin Rash       MEDICATIONS:   Taking medication(s) as prescribed? N/A  Taking over the counter medication(s?) No  Any medication side effects? No significant side effects    Any barriers to taking medication(s) as prescribed?  No  Medication(s) improving/managing symptoms?  No  Medication reconciliation completed: No      NURSING PLAN: Nursing advice to patient please be seen in clinic scheduled 8/1/2017 - today at 3:40 pm - to ED/911 for sudden worsening abdominal pain, dizziness, weakness, - please drink fluids - patient agrees with plan    RECOMMENDED DISPOSITION:  See in 4 hours, another person to drive - see above  Will comply with recommendation: Yes  If further questions/concerns or if symptoms do not improve, worsen or new symptoms develop, call your PCP or [facility] as soon as possible.      Guideline used:  Telephone Triage Protocols for Nurses, Fifth Edition, Genia [address] RN    "

## 2017-08-01 NOTE — PATIENT INSTRUCTIONS
1) Schedule your mammogram  2) Complete your FIT test once your stools have returned to normal and you are feeling better  3) Follow a clear liquid diet  4) We will try and schedule abdominal CT today (you prefer tomorrow--go to the ER if more severe pain)   5) In the meantime if you have severe pain, go to the ER.

## 2017-08-01 NOTE — PROGRESS NOTES
"  SUBJECTIVE:                                                    Maggy Goldberg is a 57 year old female who presents to clinic today for the following health issues:    ABDOMINAL PAIN     Onset: 5 days ago     Description:   Character: Dull ache  Location: right lower quadrant left lower quadrant  Radiation: None    Intensity: moderate    Progression of Symptoms:  worsening    Accompanying Signs & Symptoms:  Fever/Chills?: no   Gas/Bloating: YES  Nausea: YES  Vomitting: no   Diarrhea?: YES  Constipation:no   Dysuria or Hematuria: no    History:   Trauma: no   Previous similar pain: YES - she's had diverticulosis in the past  Previous tests done: CT    Precipitating factors:   Does the pain change with:     Food: no      BM: no     Urination: no     Alleviating factors:  none    Therapies Tried and outcome: none    LMP:  not applicable     Phone encounter earlier today:   \"NURSING ASSESSMENT:  Description:  Patient calling requesting triage - concern for diverticulitis flair - states she had diagnosis with CT scan 8/16/2016  Onset/duration:  7/27/2017  Precip. factors:  Hx diverticulitis  Associated symptoms:    1. Abdominal pain - LLQ - 6/10 - feels like \"nawing, and bloating/gas\" - been persistent over the last 7 days - not worsening - loose stools 6 x per day, sweats, small amount of nausea  -   - denies black/blody tarry stools, weakness, dizziness, vomiting, dehydration  MEDICATIONS:   Taking medication(s) as prescribed? N/A  Taking over the counter medication(s?) No  Any medication side effects? No significant side effects    Any barriers to taking medication(s) as prescribed?  No  Medication(s) improving/managing symptoms?  No  Medication reconciliation completed: No  NURSING PLAN: Nursing advice to patient please be seen in clinic scheduled 8/1/2017 - today at 3:40 pm - to ED/911 for sudden worsening abdominal pain, dizziness, weakness, - please drink fluids - patient agrees with plan  RECOMMENDED DISPOSITION:  " "See in 4 hours, another person to drive - see above  Will comply with recommendation: Yes  If further questions/concerns or if symptoms do not improve, worsen or new symptoms develop, call your PCP\"       She has constant pain is across her entire lower abdomen. Pain is not severe but bothersome. She is taking aspirin twice daily, unsure of fever. Denies blood or black tarry stools. Pain radiates to her lower back. Denies pain with urination, blood in the urine. She has noticed increased urinary frequency but has been drinking more fluids. She has tried to avoid certain foods. Her appetite has decreased but feels this is due to her mental health. Denies eating more nuts or seeds lately. Denies vaginal or urinary symptoms. She does not feel as bad as she did with her last episode of diverticulitis.     Denies vaginal discharge or pain.      Problem list and histories reviewed & adjusted, as indicated.  Additional history: as documented  Patient Active Problem List   Diagnosis     Adjustment disorder with mixed anxiety and depressed mood     Contact dermatitis and other eczema, due to unspecified cause     Allergic rhinitis     CARDIOVASCULAR SCREENING; LDL GOAL LESS THAN 100     CMC arthritis, thumb, degenerative     OCD (obsessive compulsive disorder)     Hypertension goal BP (blood pressure) < 140/90     Atypical nevi     Psoriasis     Hypercholesteremia     Lentigo     Plaque psoriasis     Lipoma of lower extremity, unspecified laterality     Achrochordon     Dermatofibroma     Skin cancer screening     History reviewed. No pertinent surgical history.    Social History   Substance Use Topics     Smoking status: Current Some Day Smoker     Types: Cigarettes     Smokeless tobacco: Never Used      Comment: 1/2 PPD     Alcohol use Yes      Comment: a couple per day     Family History   Problem Relation Age of Onset     CANCER Mother      skin cancer     Depression Mother      OSTEOPOROSIS Mother      Hypertension " Father      CANCER Father       of lung cancer     Obesity Father      DIABETES Maternal Grandmother      Alcohol/Drug Paternal Grandfather      alcohol     Asthma Maternal Uncle      Blood Disease Son      kasandra nassar     CANCER Maternal Aunt      not sure what     CANCER Maternal Aunt      not sure what     Depression Brother      Thyroid Disease Paternal Uncle          Current Outpatient Prescriptions   Medication Sig Dispense Refill     lisinopril (PRINIVIL/ZESTRIL) 40 MG tablet Take 1 tablet (40 mg) by mouth daily 90 tablet 3     triamterene-hydrochlorothiazide (DYAZIDE) 37.5-25 MG per capsule TAKE TWO CAPSULES BY MOUTH EVERY  capsule 3     metoprolol (LOPRESSOR) 50 MG tablet Take 1 tablet (50 mg) by mouth 2 times daily 90 tablet 3     desonide (DESOWEN) 0.05 % ointment Apply topically 2 times daily 60 g 3     buPROPion (WELLBUTRIN XL) 150 MG 24 hr tablet        buPROPion (WELLBUTRIN) 100 MG tablet Take by mouth daily       clonazePAM (KLONOPIN) 1 MG tablet Take 1 mg by mouth 2 times daily       Omega-3 Fatty Acids (OMEGA-3 FISH OIL PO) Take 1 g by mouth       Sertraline HCl (ZOLOFT PO) Take 200 mg by mouth daily       desonide (DESOWEN) 0.05 % ointment Apply topically 2 times daily 30 g 0     clobetasol (TEMOVATE) 0.05 % ointment Apply  topically 2 times daily. 60 g 0     ACCU-CHEK FASTCLIX LANCETS MISC 1 lancet daily. 100 each prn     glucose blood VI test strips (ACCU-CHEK SMARTVIEW) strip To check glucose once daily or provide patient with strips and meter covered under her insurance. 100 strip prn     glucose blood VI test strips strip 1 strip by In Vitro route daily. 1 Box 12     psyllium 0.52 GM capsule Take 1 capsule by mouth daily.       ORDER FOR DME 1 Device continuous prn (and wear at HS also). One medium B&C thumbkeeper 1 Device 0     ASPIRIN 325 MG OR TABS one daily       Allergies   Allergen Reactions     Pertussis Vaccine      Amoxicillin Rash     Clindamycin Rash     Recent Labs    Lab Test  06/20/17   1339  04/13/17   1227  08/16/16   1441   11/19/15   0953   10/07/14   1611  05/15/14   0905  02/24/14   1217   06/04/13   0851   10/21/11   0950   A1C   --    --    --    --   6.0   --   6.1*   --   6.2*   < >   --    < >  6.3*   LDL   --   118*   --    --   119*   --    --   98  127   --   69   < >   --    HDL   --   80   --    --   83   --    --   61  59   --   54   < >   --    TRIG   --   154*   --    --   178*   --    --   201*  228*   --   333*   < >   --    ALT   --    --   28   --    --    --    --    --   39   --   52*   < >   --    CR  0.95   --   0.76   < >  0.81   < >  0.73   --   0.63   --   0.53   < >   --    GFRESTIMATED  60*   --   79   < >  73   < >  83   --   >90   --   >90   < >   --    GFRESTBLACK  73   --   >90   GFR Calc     < >  89   < >  >90   GFR Calc     --   >90   --   >90   < >   --    POTASSIUM  3.9   --   3.5   < >  4.2   < >  3.2*   --   3.4   < >  3.2*   < >   --    TSH   --    --    --    --    --    --    --    --   1.92   --    --    --   1.23    < > = values in this interval not displayed.      BP Readings from Last 3 Encounters:   08/01/17 (!) 89/54   06/20/17 106/62   08/16/16 134/74    Wt Readings from Last 3 Encounters:   08/01/17 81.6 kg (180 lb)   06/20/17 83.5 kg (184 lb)   08/16/16 89.4 kg (197 lb)        Reviewed and updated as needed this visit by clinical staff  Reviewed and updated as needed this visit by Provider    ROS:  Denies rash, cough, ENT sx, chest pain, or SOB.    This document serves as a record of the services and decisions personally performed and made by Stacia Rodriguez MD. It was created on his/her behalf by Jaymie Buenrostro, trained medical scribe. The creation of this document is based the provider's statements to the medical scribes.    Scribleo Buenrostro, August 1, 2017  OBJECTIVE:   BP (!) 89/54  Pulse 81  Temp 98  F (36.7  C) (Oral)  Resp 16  Wt 81.6 kg (180 lb)  LMP 10/19/2010  SpO2 97%   BMI 31.89 kg/m2  Body mass index is 31.89 kg/(m^2).  BP (!) 89/54  Pulse 81  Temp 98  F (36.7  C) (Oral)  Resp 16  Wt 81.6 kg (180 lb)  LMP 10/19/2010  SpO2 97%  BMI 31.89 kg/m2    Gen: alert, no acute distress  Eyes: anicteric, normal lids and conjunctiva; PERRL  ENT: OP normal  NECK: no masses, no thyromegaly appreciated  Resp: CTAB, normal respiratory effort  CV: Regular rate and rhythm, no MGR, no peripheral edema  ABD; soft, she is ttp across the low abdomen, LLQ, RLQ, no appreciable masses or hepatosplenomegaly  Psych: A and O x 3, appropriate affect     Diagnostic Test Results:  Results for orders placed or performed in visit on 08/01/17 (from the past 24 hour(s))   CBC with platelets differential   Result Value Ref Range    WBC 8.5 4.0 - 11.0 10e9/L    RBC Count 3.68 (L) 3.8 - 5.2 10e12/L    Hemoglobin 12.5 11.7 - 15.7 g/dL    Hematocrit 36.0 35.0 - 47.0 %    MCV 98 78 - 100 fl    MCH 34.0 (H) 26.5 - 33.0 pg    MCHC 34.7 31.5 - 36.5 g/dL    RDW 13.0 10.0 - 15.0 %    Platelet Count 222 150 - 450 10e9/L    Diff Method Automated Method     % Neutrophils 76.4 %    % Lymphocytes 15.0 %    % Monocytes 6.9 %    % Eosinophils 1.3 %    % Basophils 0.4 %    Absolute Neutrophil 6.5 1.6 - 8.3 10e9/L    Absolute Lymphocytes 1.3 0.8 - 5.3 10e9/L    Absolute Monocytes 0.6 0.0 - 1.3 10e9/L    Absolute Eosinophils 0.1 0.0 - 0.7 10e9/L    Absolute Basophils 0.0 0.0 - 0.2 10e9/L        ASSESSMENT/PLAN:   1. LLQ abdominal pain  With history of diverticulitis last August. Pt reports this feels similar to previous episode, though less severe this time. She will start clear liquid diet and schedule CT scan. I recommended CT scan this afternoon, but she is unable and would like to go in the morning. She agrees to go to the ER in the meantime if her symptoms become more severe.   - CBC with platelets differential  CMP    2. Diarrhea, unspecified type   see above   - CBC with platelets differential    3. Colon cancer  screening   when stools return to normal and current symptoms resolved complete FIT test.    - Fecal colorectal cancer screen FIT; Future    4. Encounter for screening mammogram for breast cancer     - MA Screening Digital Bilateral; Future      Patient Instructions   1) Schedule your mammogram  2) Complete your FIT test once your stools have returned to normal and you are feeling better  3) Follow a clear liquid diet  4) We will try and schedule abdominal CT today. Pending results we will move forward from there.  5) In the meantime if you have severe pain, go to the ER.      The information in this document, created by the medical scribe for me, accurately reflects the services I personally performed and the decisions made by me. I have reviewed and approved this document for accuracy. 08/01/17    Stacia Rodriguez MD  [facility]

## 2017-08-01 NOTE — NURSING NOTE
"Chief Complaint   Patient presents with     Abdominal Pain       Initial BP (!) 89/54  Pulse 81  Temp 98  F (36.7  C) (Oral)  Resp 16  Wt 180 lb (81.6 kg)  LMP 10/19/2010  SpO2 97%  BMI 31.89 kg/m2 Estimated body mass index is 31.89 kg/(m^2) as calculated from the following:    Height as of 6/20/17: 5' 3\" (1.6 m).    Weight as of this encounter: 180 lb (81.6 kg).  Medication Reconciliation: complete       Marta Gomez MA     "

## 2017-08-02 ENCOUNTER — TELEPHONE (OUTPATIENT)
Dept: FAMILY MEDICINE | Facility: CLINIC | Age: 57
End: 2017-08-02

## 2017-08-02 LAB
ALBUMIN SERPL-MCNC: 3.5 G/DL (ref 3.4–5)
ALP SERPL-CCNC: 65 U/L (ref 40–150)
ALT SERPL W P-5'-P-CCNC: 23 U/L (ref 0–50)
ANION GAP SERPL CALCULATED.3IONS-SCNC: 10 MMOL/L (ref 3–14)
AST SERPL W P-5'-P-CCNC: 18 U/L (ref 0–45)
BILIRUB SERPL-MCNC: 0.6 MG/DL (ref 0.2–1.3)
BUN SERPL-MCNC: 19 MG/DL (ref 7–30)
CALCIUM SERPL-MCNC: 9.4 MG/DL (ref 8.5–10.1)
CHLORIDE SERPL-SCNC: 104 MMOL/L (ref 94–109)
CO2 SERPL-SCNC: 23 MMOL/L (ref 20–32)
CREAT SERPL-MCNC: 0.88 MG/DL (ref 0.52–1.04)
GFR SERPL CREATININE-BSD FRML MDRD: 66 ML/MIN/1.7M2
GLUCOSE SERPL-MCNC: 113 MG/DL (ref 70–99)
POTASSIUM SERPL-SCNC: 3.5 MMOL/L (ref 3.4–5.3)
PROT SERPL-MCNC: 7.1 G/DL (ref 6.8–8.8)
SODIUM SERPL-SCNC: 137 MMOL/L (ref 133–144)

## 2017-08-02 NOTE — TELEPHONE ENCOUNTER
Reason for call:  Other   Patient called regarding (reason for call): patient is calling to let you know that she is cancelling her CT scan appointment she is feeling much better   Additional comments: this was a CT Scan of her abdomin      Phone number to reach patient:  Home number on file [phone] (home)    Best Time:  N/A    Can we leave a detailed message on this number?  Not Applicable

## 2017-08-02 NOTE — TELEPHONE ENCOUNTER
Dr. Rodriguez-Please review.  Would you still recommend patient proceed with CT scan?    Thank you!  LORRAINE LuiN, RN

## 2017-08-03 NOTE — TELEPHONE ENCOUNTER
If her symptoms have resolved or almost resolved, she does not need to proceed with the CT. Stacia Rodriguez M.D.

## 2017-08-03 NOTE — TELEPHONE ENCOUNTER
Maggy called back and I read 's message to her.  She agrees with plan.  I asked her to call us back if anything changes.    Colette Robb RN

## 2017-09-20 PROCEDURE — 82274 ASSAY TEST FOR BLOOD FECAL: CPT | Performed by: FAMILY MEDICINE

## 2017-09-21 DIAGNOSIS — Z12.11 COLON CANCER SCREENING: ICD-10-CM

## 2017-09-21 LAB — HEMOCCULT STL QL IA: NEGATIVE

## 2017-09-25 NOTE — PROGRESS NOTES
Excellent! Please call or sent a [employer] message if you have any questions. Stacia Rodriguez M.D.

## 2018-01-01 DIAGNOSIS — I10 HYPERTENSION GOAL BP (BLOOD PRESSURE) < 140/90: ICD-10-CM

## 2018-01-02 ENCOUNTER — MYC MEDICAL ADVICE (OUTPATIENT)
Dept: FAMILY MEDICINE | Facility: CLINIC | Age: 58
End: 2018-01-02

## 2018-01-02 DIAGNOSIS — M54.9 BACK PAIN, UNSPECIFIED BACK LOCATION, UNSPECIFIED BACK PAIN LATERALITY, UNSPECIFIED CHRONICITY: Primary | ICD-10-CM

## 2018-01-02 RX ORDER — METOPROLOL TARTRATE 50 MG
TABLET ORAL
Qty: 90 TABLET | Refills: 2 | Status: SHIPPED | OUTPATIENT
Start: 2018-01-02 | End: 2018-01-04

## 2018-01-02 NOTE — TELEPHONE ENCOUNTER
Requested Prescriptions   Pending Prescriptions Disp Refills     metoprolol (LOPRESSOR) 50 MG tablet [Pharmacy Med Name: METOPROLOL TARTRATE 50MG TABS]  Last Written Prescription Date:  6/20/2017  Last Fill Quantity: 90 tablet,  # refills: 3   Last Office Visit with [facility], [facility] or [facility] prescribing provider:  8/1/2017   Future Office Visit:      90 tablet 3     Sig: TAKE ONE TABLET BY MOUTH TWICE A DAY    Beta-Blockers Protocol Passed    1/1/2018 11:48 AM       Passed - Blood pressure under 140/90    BP Readings from Last 3 Encounters:   08/01/17 (!) 89/54   06/20/17 106/62   08/16/16 134/74          Passed - Patient is age 6 or older       Passed - Recent or future visit with authorizing provider's specialty    Patient had office visit in the last year or has a visit in the next 30 days with authorizing provider.  See chart review.

## 2018-01-03 ENCOUNTER — TELEPHONE (OUTPATIENT)
Dept: FAMILY MEDICINE | Facility: CLINIC | Age: 58
End: 2018-01-03

## 2018-01-03 DIAGNOSIS — I10 HYPERTENSION GOAL BP (BLOOD PRESSURE) < 140/90: ICD-10-CM

## 2018-01-03 NOTE — TELEPHONE ENCOUNTER
We received a prescription for Metoprolol tartrate 50 mg 1 bid #90 which is a 45 day supply. Was this mean to be a 90DS ? Please let us know.    Thank you,  Danuta Smiley, [facility] [place]

## 2018-01-04 RX ORDER — METOPROLOL TARTRATE 50 MG
50 TABLET ORAL 2 TIMES DAILY
Qty: 180 TABLET | Refills: 2 | Status: SHIPPED | OUTPATIENT
Start: 2018-01-04 | End: 2018-03-22

## 2018-01-04 NOTE — TELEPHONE ENCOUNTER
Dr. Rodriguez would like three month supply ordered.    New order sent in per Dr. Rodriguez.  FARHAT Meléndez, BSN, RN

## 2018-01-22 ENCOUNTER — THERAPY VISIT (OUTPATIENT)
Dept: CHIROPRACTIC MEDICINE | Facility: CLINIC | Age: 58
End: 2018-01-22
Payer: COMMERCIAL

## 2018-01-22 DIAGNOSIS — M75.21 BICIPITAL TENDINITIS OF SHOULDER, RIGHT: ICD-10-CM

## 2018-01-22 DIAGNOSIS — M99.02 THORACIC SEGMENT DYSFUNCTION: ICD-10-CM

## 2018-01-22 DIAGNOSIS — M54.2 CERVICALGIA: ICD-10-CM

## 2018-01-22 DIAGNOSIS — M25.511 RIGHT SHOULDER PAIN, UNSPECIFIED CHRONICITY: ICD-10-CM

## 2018-01-22 DIAGNOSIS — M99.01 CERVICAL SEGMENT DYSFUNCTION: Primary | ICD-10-CM

## 2018-01-22 PROCEDURE — 97035 APP MDLTY 1+ULTRASOUND EA 15: CPT | Performed by: CHIROPRACTOR

## 2018-01-22 PROCEDURE — 99203 OFFICE O/P NEW LOW 30 MIN: CPT | Mod: 25 | Performed by: CHIROPRACTOR

## 2018-01-22 PROCEDURE — 98940 CHIROPRACT MANJ 1-2 REGIONS: CPT | Mod: AT | Performed by: CHIROPRACTOR

## 2018-01-22 NOTE — MR AVS SNAPSHOT
After Visit Summary   1/22/2018    Maggy Goldberg    MRN: [id number]           Patient Information     Date Of Birth          1960        Visit Information        Provider Department      1/22/2018 8:45 AM Monster De La Cruz DC [facility]        Today's Diagnoses     Cervical segment dysfunction    -  1    Cervicalgia        Thoracic segment dysfunction        Right shoulder pain, unspecified chronicity        Bicipital tendinitis of shoulder, right           Follow-ups after your visit        Your next 10 appointments already scheduled     Jan 29, 2018  2:00 PM CST   [facility] with EDITA Tafoya ([facility]  )    [address]  [place] [address] [phone]              Who to contact     If you have questions or need follow up information about today's clinic visit or your schedule please contact [facility] directly at [phone].  Normal or non-critical lab and imaging results will be communicated to you by [employer]hart, letter or phone within 4 business days after the clinic has received the results. If you do not hear from us within 7 days, please contact the clinic through [employer]hart or phone. If you have a critical or abnormal lab result, we will notify you by phone as soon as possible.  Submit refill requests through [employer] or call your pharmacy and they will forward the refill request to us. Please allow 3 business days for your refill to be completed.          Additional Information About Your Visit        [employer] Information     [employer] gives you secure access to your electronic health record. If you see a primary care provider, you can also send messages to your care team and make appointments. If you have questions, please call your primary care clinic.  If you do not have a primary care provider, please call [phone] and they will assist you.        Care EveryWhere ID     This is your Care EveryWhere ID. This could be used  by other organizations to access your [place] medical records  [id number]        Your Vitals Were     Last Period                   10/19/2010            Blood Pressure from Last 3 Encounters:   08/01/17 (!) 89/54   06/20/17 106/62   08/16/16 134/74    Weight from Last 3 Encounters:   08/01/17 81.6 kg (180 lb)   06/20/17 83.5 kg (184 lb)   08/16/16 89.4 kg (197 lb)              We Performed the Following     CHIROPRAC MANIP,SPINAL,1-2 REGIONS     OFFICE/OUTPT VISIT,NEW,LEVL III     ULTRASOUND THERAPY        Primary Care Provider Office Phone # Fax #    Stacia Rodriguez [phone] [address] [facility] [address]        Equal Access to Services     ROMAN ARSHAD : Hadii ramesh palmo Soberta, waaxda luqadaha, qaybta kaalmada adedejayavincenzo, ted patino . So [facility] [phone].    ATENCIÓN: Si habla español, tiene a chatterjee disposición servicios gratuitos de asistencia lingüística. Llame al [phone].    We comply with applicable federal civil rights laws and [place] laws. We do not discriminate on the basis of race, color, national origin, age, disability, sex, sexual orientation, or gender identity.            Thank you!     Thank you for choosing [facility]  for your care. Our goal is always to provide you with excellent care. Hearing back from our patients is one way we can continue to improve our services. Please take a few minutes to complete the written survey that you may receive in the mail after your visit with us. Thank you!             Your Updated Medication List - Protect others around you: Learn how to safely use, store and throw away your medicines at www.disposemymeds.org.          This list is accurate as of: 1/22/18 12:05 PM.  Always use your most recent med list.                   Brand Name Dispense Instructions for use Diagnosis    aspirin 325 MG tablet      one daily        blood glucose monitoring lancets     100 each    1 lancet  daily.    DM (diabetes mellitus) (H)       * blood glucose monitoring test strip    no brand specified    1 Box    1 strip by In Vitro route daily.    Type II or unspecified type diabetes mellitus without mention of complication, not stated as uncontrolled       * blood glucose monitoring test strip    ACCU-CHEK SMARTVIEW    100 strip    To check glucose once daily or provide patient with strips and meter covered under her insurance.    DM (diabetes mellitus) (H)       clobetasol 0.05 % ointment    TEMOVATE    60 g    Apply  topically 2 times daily.    Psoriasis       clonazePAM 1 MG tablet    klonoPIN     Take 1 mg by mouth 2 times daily        * desonide 0.05 % ointment    DESOWEN    30 g    Apply topically 2 times daily    Psoriasis       * desonide 0.05 % ointment    DESOWEN    60 g    Apply topically 2 times daily    Plaque psoriasis       lisinopril 40 MG tablet    PRINIVIL/ZESTRIL    90 tablet    Take 1 tablet (40 mg) by mouth daily    Hypertension goal BP (blood pressure) < 140/90       metoprolol tartrate 50 MG tablet    LOPRESSOR    180 tablet    Take 1 tablet (50 mg) by mouth 2 times daily    Hypertension goal BP (blood pressure) < 140/90       OMEGA-3 FISH OIL PO      Take 1 g by mouth        order for DME     1 Device    1 Device continuous prn (and wear at HS also). One medium B&C thumbkeeper    Hand pain       psyllium 0.52 G capsule      Take 1 capsule by mouth daily.        triamterene-hydrochlorothiazide 37.5-25 MG per capsule    DYAZIDE    180 capsule    TAKE TWO CAPSULES BY MOUTH EVERY DAY    Hypertension goal BP (blood pressure) < 140/90, Hypopotassemia       * WELLBUTRIN 100 MG tablet   Generic drug:  buPROPion      Take by mouth daily        * buPROPion 150 MG 24 hr tablet    WELLBUTRIN XL          ZOLOFT PO      Take 200 mg by mouth daily        * Notice:  This list has 6 medication(s) that are the same as other medications prescribed for you. Read the directions carefully, and ask your  doctor or other care provider to review them with you.

## 2018-01-22 NOTE — PROGRESS NOTES
Chiropractic Clinic Visit    PCP: Stacia Rodriguez    Maggy Goldberg is a 58 year old female who is seen  in consultation at the request of  Stacia Rodriguez M.D. presenting with chronic R sided neck pain and R shoulder pain. Patient reports that the onset was 4 months ago.  When asked, patient denies falling, slipping, bending and reaching or sleeping awkwardly. She cannot relate a specific incident that could have caused the px it seemed gradual. The pt had initial treatment for the R shoulder with PT with no improvement. Pain is graded a 1-10 on VAS depending on the activity. Lifting the arm and sleeping increases the pain. The pt is located in the R anterior shoulder and the R neck area. It radiates at times to the R upper shoulder. She had 2 cortisone injections in the R shoulder with no improvement. She feels the px in the R shoulder is now affecting the neck and she thinks it is related. The pt denies neck pain or shoulder pain prior to this incident. She denies weakness in the extremities or other unusual sx .  Prior to onset, the patient was able to sleep for 8-12 hours. Patient notes that due to symptoms, they can only sleep interrupted due to pain. Maggy Goldberg notes lifting the right arm rated at a 7/10 and  prior to this onset it was 0/10.    Injury: There was no injury related to this episode     Location of Pain: right cervical and R shoulder at the following level(s) C2 , C3 , C7 , T1  and T6   Duration of Pain: 4 months   Rating of Pain at worst: 10/10  Rating of Pain Currently: 7/10  Symptoms are better with: Nothing  Symptoms are worse with: sleeping, lifting the R arm  Additional Features: paresthesias        Health History  as reported by the patient:    How does the patient rate their own health:   Good    Current or past medical history:   Depression, High blood pressure, Mental Illness, Numbness/tingling, Pain at night/rest and Smoking    Medical allergies  Other: amoxicillin,  clindamycin      Past Traumas/Surgeries  Other:  Tubal ligation, bone growth, vocal chords, jaw    Family History  Family History   Problem Relation Age of Onset     CANCER Mother      skin cancer     Depression Mother      OSTEOPOROSIS Mother      Hypertension Father      CANCER Father       of lung cancer     Obesity Father      DIABETES Maternal Grandmother      Alcohol/Drug Paternal Grandfather      alcohol     Asthma Maternal Uncle      Blood Disease Son      kasandra nassar     CANCER Maternal Aunt      not sure what     CANCER Maternal Aunt      not sure what     Depression Brother      Thyroid Disease Paternal Uncle        Medications:  Anti-depressants, Anti-inflammatory and High blood pressure    Occupation:    Health, unit, coordinator    Primary job tasks:   Computer work, Prolonged sitting and Repetitive tasks    Barriers as home/work:   none    Additional health Issues:     None       Review of Systems  Musculoskeletal: as above  Remainder of review of systems is negative including constitutional, CV, pulmonary, GI, Skin and Neurologic except as noted in HPI or medical history.    Past Medical History:   Diagnosis Date     Adjustment disorder with mixed anxiety and depressed mood      CARDIOVASCULAR SCREENING; LDL GOAL LESS THAN 100 2010     CARDIOVASCULAR SCREENING; LDL GOAL LESS THAN 160      CMC arthritis, thumb, degenerative      Contact dermatitis and other eczema, due to unspecified cause     contact and exzema     HTN (hypertension)      Hypercholesteremia      Hypertension goal BP (blood pressure) < 140/90      Iritis      OCD (obsessive compulsive disorder)      Psoriasis      Type II or unspecified type diabetes mellitus without mention of complication, not stated as uncontrolled      Uncomplicated type 2 diabetes mellitus (H) 10/14/2011     No past surgical history on file.    Objective  LMP 10/19/2010    GENERAL APPEARANCE: healthy, alert and no distress   GAIT: NORMAL  SKIN: no  suspicious lesions or rashes  NEURO: Normal strength and tone, mentation intact and speech normal  PSYCH:  mentation appears normal and affect normal/bright    Maggy was asked to complete the Neck Disability Index, today in the office. NDI Disability score: 18%; pain severity scale: 10/10..  MARIELOS: 18  greer: 5  Subscore: 3    Cervical Spine Exam    Range of Motion:         Full active and passive ROM forward flexion,  decreased extension, lateral rotation, lateral flexion with pain at end range    Inspection:         No visible deformity        normal lordotic curvature maintained  Poor posture, anterior head carriage, slumped seated posture, increased thoracic kyphosis and anterior rotation of shoulders    Tender:        R biceps tendon, R subscapularis, R longus coli    Non-Tender:        remainder of cervical spine area    Muscle strength:       C4 (shoulder shrug)  symmetric 5/5 Normal       C5 (shoulder abduction) symmetric 5/5 Normal       C6 (elbow flexion) symmetric 5/5 Normal       C7 (elbow extension) symmetric 5/5 Normal       C8 (finger abduction, thumb flexion) symmetric 5/5 Normal    Reflexes:        C5 (biceps) symmetric 2 bilaterally       C6 (supinator) symmetric 2 bilaterally       C7 (triceps) symmetric 2 bilaterally    Sensation:       grossly intact througout bilateral upper extremities    Special Tests:       positive (+) Spurling  Carlos's- positive, VBI- negative and Epps Taylor - negative    Lymphatics:        no edema noted in the upper extremities       Segmental spinal dysfunction/restrictions found at:C2 , C3 , C7 , T1  and T6   .      The following soft tissue hypotonicities were observed:Lev scapulae: ache, dull pain and sharp pain, referred pain: yes  Sub-occiput: ache and dull pain, referred pain: no    Trigger points were found in:none     Muscle spasm found in:Levator scapulae and Sub-occipital      Radiology:  None     Assessment:    1. Cervical segment dysfunction    2. Cervicalgia     3. Thoracic segment dysfunction    4. Right shoulder pain, unspecified chronicity    5. Bicipital tendinitis of shoulder, right        RX ordered/plan of care  Anticipated outcomes  Possible risks and side effects    After discussing the risk and benefits of care, patient consented to treatment    Patient's condition:  Patient had restrictions pre-manipulation    Treatment effectiveness:  Post manipulation there is better intersegmental movement and Patient claims to feel looser post manipulation    Plan:    Procedures:    Evaluation and Management:  04144 Moderate level exam 30 min    CMT:  16668 Chiropractic manipulative treatment 1-2 regions performed   Cervical: Diversified, C2, C7 , Prone, Supine  Thoracic: Diversified, T1, T7, T9, Prone    Modalities:  87957: US:  1.3 Bundy/cm squared for 8  minutes at 1 mhz  50 pulsed, Location:R biceps     Therapeutic procedures:  None    Response to Treatment  Reduction in symptoms as reported by patient    Prognosis: Good      Treatment plan and goals:  Goals:  SLEEPING: the patient specific goal is to attain his pre-injury status of 8-12 hours comfortably  Lifting the R arm    Frequency of care  Duration of care is estimated to be 6 weeks, from the initial treatment.  It is estimated that the patient will need a total of 6 visits to resolve this episode.  For the initial therapeutic trial of care, the frequency is recommended at 1  X week, once daily.  A reevaluation would be clinically appropriate in 6 visits, to determine progress and further course of care.    In-Office Treatment  Evaluation  Spinal Chiropractic Manipulative Therapy  US therapy  ACP discussion  Postural correction when seated NV      Recommendations:    Instructions:expect soreness    Follow-up:  Return to care in 1 week with postural correction   Shoulder/biceps stretching .     Disclaimer: This note consists of symbols derived from keyboarding, dictation and/or voice recognition software. As a  result, there may be errors in the script that have gone undetected. Please consider this when interpreting information found in this chart.

## 2018-01-29 ENCOUNTER — THERAPY VISIT (OUTPATIENT)
Dept: CHIROPRACTIC MEDICINE | Facility: CLINIC | Age: 58
End: 2018-01-29
Payer: COMMERCIAL

## 2018-01-29 ENCOUNTER — HOSPITAL ENCOUNTER (OUTPATIENT)
Dept: MAMMOGRAPHY | Facility: CLINIC | Age: 58
Discharge: HOME | End: 2018-01-29
Attending: FAMILY MEDICINE | Admitting: FAMILY MEDICINE
Payer: COMMERCIAL

## 2018-01-29 DIAGNOSIS — M75.21 BICIPITAL TENDINITIS OF RIGHT SHOULDER: ICD-10-CM

## 2018-01-29 DIAGNOSIS — M54.2 CERVICALGIA: ICD-10-CM

## 2018-01-29 DIAGNOSIS — Z12.31 ENCOUNTER FOR SCREENING MAMMOGRAM FOR BREAST CANCER: ICD-10-CM

## 2018-01-29 DIAGNOSIS — M99.01 CERVICAL SEGMENT DYSFUNCTION: Primary | ICD-10-CM

## 2018-01-29 DIAGNOSIS — M25.511 RIGHT SHOULDER PAIN, UNSPECIFIED CHRONICITY: ICD-10-CM

## 2018-01-29 DIAGNOSIS — M99.02 THORACIC SEGMENT DYSFUNCTION: ICD-10-CM

## 2018-01-29 PROCEDURE — 97110 THERAPEUTIC EXERCISES: CPT | Performed by: CHIROPRACTOR

## 2018-01-29 PROCEDURE — 98940 CHIROPRACT MANJ 1-2 REGIONS: CPT | Mod: AT | Performed by: CHIROPRACTOR

## 2018-01-29 PROCEDURE — 97035 APP MDLTY 1+ULTRASOUND EA 15: CPT | Performed by: CHIROPRACTOR

## 2018-01-29 PROCEDURE — 77067 SCR MAMMO BI INCL CAD: CPT

## 2018-01-30 ENCOUNTER — HOSPITAL ENCOUNTER (OUTPATIENT)
Dept: MAMMOGRAPHY | Facility: CLINIC | Age: 58
Discharge: HOME | End: 2018-01-30
Attending: FAMILY MEDICINE | Admitting: FAMILY MEDICINE
Payer: COMMERCIAL

## 2018-01-30 DIAGNOSIS — R92.8 ABNORMAL MAMMOGRAM: ICD-10-CM

## 2018-01-30 PROBLEM — M99.01 CERVICAL SEGMENT DYSFUNCTION: Status: ACTIVE | Noted: 2018-01-30

## 2018-01-30 PROBLEM — M99.02 THORACIC SEGMENT DYSFUNCTION: Status: ACTIVE | Noted: 2018-01-30

## 2018-01-30 PROBLEM — M75.21 BICIPITAL TENDINITIS OF RIGHT SHOULDER: Status: ACTIVE | Noted: 2018-01-30

## 2018-01-30 PROBLEM — M54.2 CERVICALGIA: Status: ACTIVE | Noted: 2018-01-30

## 2018-01-30 PROBLEM — M25.511 RIGHT SHOULDER PAIN, UNSPECIFIED CHRONICITY: Status: ACTIVE | Noted: 2018-01-30

## 2018-01-30 PROCEDURE — G0279 TOMOSYNTHESIS, MAMMO: HCPCS

## 2018-01-30 NOTE — PROGRESS NOTES
Visit #:  2    Subjective:  Maggy Goldebrg is a 58 year old female who is seen in f/u up for:        Cervical segment dysfunction  Cervicalgia  Thoracic segment dysfunction  Right shoulder pain, unspecified chronicity  Bicipital tendinitis of right shoulder.     Since last visit on 1/22/2018,  Maggy Goldberg reports:    Area of chief complaint:  Cervical and Thoracic R shoulder :  Symptoms are graded at 5/10. The quality is described as stiff, achey, dull.  Motion has increased, but is still not normal. The pt reports 20% improvement in the R neck area and 40% improvement in the R shoulder. She noted pain reduction post treatment. Sitting will increase the pain in the R shoulder and neck  when at the computer. The pt denies HA sx. She denies weakness or other unusual sx. Patient feels that they are improved due to a reduction in symptoms.     Since last visit the patient feels that they are 20% improved in the neck area and 40% improved in the R shoulder percent  improved from last visit.       Objective:  The following was observed:    P: pain elicited on palpation: C2, C5. T8, T9  A: static palpation demonstrates intersegmental asymmetry   R: motion palpation notes restricted motion  T: hypertonicity at: Levator scapulae and Sub-occipital R>>L    Segmental spinal dysfunction/restrictions found at:  : C2, C5. T8, T9      Assessment:    Diagnoses:      1. Cervical segment dysfunction    2. Cervicalgia    3. Thoracic segment dysfunction    4. Right shoulder pain, unspecified chronicity    5. Bicipital tendinitis of right shoulder        Patient's condition:  Patient had restrictions pre-manipulation    Treatment effectiveness:  Post manipulation there is better intersegmental movement and Patient claims to feel looser post manipulation      Procedures:  CMT:  24242 Chiropractic manipulative treatment 1-2 regions performed   Cervical: Diversified, C2, C5 , Supine  Thoracic: Diversified, T5, T8,  Prone    Modalities:  61266: US:  1.6 Bundy/cm squared for 8 minutes at 1 mhz  Location: R R/C and cervical musculature     Therapeutic procedures:  Gave doorway pectoralis stretch in 3 different positions starting at 90 degree angles from body and raising arms 3 levels higher with demonstration.    Gave biceps stretch in the doorway with demonstration  Gave internal shoulder rotation with towel as per stretching protocol with demonstration.   Per 8 minutes   By Guy SNOWDEN    Response to Treatment  Reduction in symptoms as reported by patient    Prognosis: Good    Progress towards Goals: Patient is making progress towards the goal.Goals:  SLEEPING: the patient specific goal is to attain his pre-injury status of 8-12 hours comfortably  Lifting the R arm       Recommendations:    Instructions:none     Follow-up:  Return to care in 1 week with postural correction.

## 2018-02-05 ENCOUNTER — THERAPY VISIT (OUTPATIENT)
Dept: CHIROPRACTIC MEDICINE | Facility: CLINIC | Age: 58
End: 2018-02-05
Payer: COMMERCIAL

## 2018-02-05 DIAGNOSIS — M54.2 CERVICALGIA: ICD-10-CM

## 2018-02-05 DIAGNOSIS — M75.21 BICIPITAL TENDINITIS OF RIGHT SHOULDER: ICD-10-CM

## 2018-02-05 DIAGNOSIS — M99.01 CERVICAL SEGMENT DYSFUNCTION: Primary | ICD-10-CM

## 2018-02-05 DIAGNOSIS — M25.511 RIGHT SHOULDER PAIN, UNSPECIFIED CHRONICITY: ICD-10-CM

## 2018-02-05 DIAGNOSIS — M99.02 THORACIC SEGMENT DYSFUNCTION: ICD-10-CM

## 2018-02-05 PROCEDURE — 97112 NEUROMUSCULAR REEDUCATION: CPT | Mod: 59 | Performed by: CHIROPRACTOR

## 2018-02-05 PROCEDURE — 98940 CHIROPRACT MANJ 1-2 REGIONS: CPT | Mod: AT | Performed by: CHIROPRACTOR

## 2018-02-05 PROCEDURE — 97035 APP MDLTY 1+ULTRASOUND EA 15: CPT | Performed by: CHIROPRACTOR

## 2018-02-05 NOTE — MR AVS SNAPSHOT
After Visit Summary   2/5/2018    Maggy Goldberg    MRN: [id number]           Patient Information     Date Of Birth          1960        Visit Information        Provider Department      2/5/2018 2:15 PM Monster De La Cruz DC [facility]        Today's Diagnoses     Cervical segment dysfunction    -  1    Cervicalgia        Thoracic segment dysfunction        Right shoulder pain, unspecified chronicity        Bicipital tendinitis of right shoulder           Follow-ups after your visit        Your next 10 appointments already scheduled     Feb 26, 2018  4:00 PM CST   [facility] with EDITA Tafoya ([facility]  )    [address]  [place] [address] [phone]              Who to contact     If you have questions or need follow up information about today's clinic visit or your schedule please contact [facility] directly at [phone].  Normal or non-critical lab and imaging results will be communicated to you by [employer], letter or phone within 4 business days after the clinic has received the results. If you do not hear from us within 7 days, please contact the clinic through [employer] or phone. If you have a critical or abnormal lab result, we will notify you by phone as soon as possible.  Submit refill requests through [employer] or call your pharmacy and they will forward the refill request to us. Please allow 3 business days for your refill to be completed.          Additional Information About Your Visit        [employer] Information     [employer] gives you secure access to your electronic health record. If you see a primary care provider, you can also send messages to your care team and make appointments. If you have questions, please call your primary care clinic.  If you do not have a primary care provider, please call [phone] and they will assist you.        Care EveryWhere ID     This is your Care EveryWhere ID. This could be used by  other organizations to access your [place] medical records  [id number]        Your Vitals Were     Last Period                   10/19/2010            Blood Pressure from Last 3 Encounters:   08/01/17 (!) 89/54   06/20/17 106/62   08/16/16 134/74    Weight from Last 3 Encounters:   08/01/17 81.6 kg (180 lb)   06/20/17 83.5 kg (184 lb)   08/16/16 89.4 kg (197 lb)              We Performed the Following     CHIROPRAC MANIP,SPINAL,1-2 REGIONS     NEUROMUSCULAR RE-EDUCATION     ULTRASOUND THERAPY        Primary Care Provider Office Phone # Fax #    Stacia Rodriguez [phone] [address] [facility] [address]        Equal Access to Services     ROMAN ARSHAD : Hadii aad ku hadasho Soomaali, waaxda luqadaha, qaybta kaalmada adeegyada, waxay baldomero patino . So [facility] [phone].    ATENCIÓN: Si habla español, tiene a chatterjee disposición servicios gratuitos de asistencia lingüística. [facility] [phone].    We comply with applicable federal civil rights laws and [place] laws. We do not discriminate on the basis of race, color, national origin, age, disability, sex, sexual orientation, or gender identity.            Thank you!     Thank you for choosing [facility]  for your care. Our goal is always to provide you with excellent care. Hearing back from our patients is one way we can continue to improve our services. Please take a few minutes to complete the written survey that you may receive in the mail after your visit with us. Thank you!             Your Updated Medication List - Protect others around you: Learn how to safely use, store and throw away your medicines at www.disposemymeds.org.          This list is accurate as of 2/5/18  4:29 PM.  Always use your most recent med list.                   Brand Name Dispense Instructions for use Diagnosis    aspirin 325 MG tablet      one daily        blood glucose monitoring lancets     100 each    1 lancet daily.    DM  (diabetes mellitus) (H)       * blood glucose monitoring test strip    no brand specified    1 Box    1 strip by In Vitro route daily.    Type II or unspecified type diabetes mellitus without mention of complication, not stated as uncontrolled       * blood glucose monitoring test strip    ACCU-CHEK SMARTVIEW    100 strip    To check glucose once daily or provide patient with strips and meter covered under her insurance.    DM (diabetes mellitus) (H)       clobetasol 0.05 % ointment    TEMOVATE    60 g    Apply  topically 2 times daily.    Psoriasis       clonazePAM 1 MG tablet    klonoPIN     Take 1 mg by mouth 2 times daily        * desonide 0.05 % ointment    DESOWEN    30 g    Apply topically 2 times daily    Psoriasis       * desonide 0.05 % ointment    DESOWEN    60 g    Apply topically 2 times daily    Plaque psoriasis       lisinopril 40 MG tablet    PRINIVIL/ZESTRIL    90 tablet    Take 1 tablet (40 mg) by mouth daily    Hypertension goal BP (blood pressure) < 140/90       metoprolol tartrate 50 MG tablet    LOPRESSOR    180 tablet    Take 1 tablet (50 mg) by mouth 2 times daily    Hypertension goal BP (blood pressure) < 140/90       OMEGA-3 FISH OIL PO      Take 1 g by mouth        order for DME     1 Device    1 Device continuous prn (and wear at HS also). One medium B&C thumbkeeper    Hand pain       psyllium 0.52 G capsule      Take 1 capsule by mouth daily.        triamterene-hydrochlorothiazide 37.5-25 MG per capsule    DYAZIDE    180 capsule    TAKE TWO CAPSULES BY MOUTH EVERY DAY    Hypertension goal BP (blood pressure) < 140/90, Hypopotassemia       * WELLBUTRIN 100 MG tablet   Generic drug:  buPROPion      Take by mouth daily        * buPROPion 150 MG 24 hr tablet    WELLBUTRIN XL          ZOLOFT PO      Take 200 mg by mouth daily        * Notice:  This list has 6 medication(s) that are the same as other medications prescribed for you. Read the directions carefully, and ask your doctor or other  care provider to review them with you.

## 2018-02-05 NOTE — PROGRESS NOTES
Visit #:  2    Subjective:  Maggy Goldberg is a 58 year old female who is seen in f/u up for:        Cervical segment dysfunction  Cervicalgia  Thoracic segment dysfunction  Right shoulder pain, unspecified chronicity  Bicipital tendinitis of right shoulder.     Since last visit   Maggy Goldberg reports:    Area of chief complaint:  Cervical and Thoracic R shoulder :  Symptoms are graded at 5/10. The quality is described as stiff, achey, dull.  Motion has increased, but is still not normal. The pt reports at least 75% improvement in the R shoulder and 65% improvement in the R neck and R shoulder however she continues to report pain when reaching up high to pull charts off the shelf. She is able to turn the head in either direction with less pain. The pt is pleased with her progress. She denies weakness in the extremities or other unusual sx.       Since last visit the patient feels that they are 75% improved in the neck area and 65% improved in the R shoulder percent  improved from last visit.       Objective:  The following was observed:    P: pain elicited on palpation: C2, C5. T8, T9  A: static palpation demonstrates intersegmental asymmetry   R: motion palpation notes restricted motion  T: hypertonicity at: Levator scapulae and Sub-occipital R>>L    Segmental spinal dysfunction/restrictions found at:  : C2, C5. T8, T9      Assessment:    Diagnoses:      1. Cervical segment dysfunction    2. Cervicalgia    3. Thoracic segment dysfunction    4. Right shoulder pain, unspecified chronicity    5. Bicipital tendinitis of right shoulder        Patient's condition:  Patient had restrictions pre-manipulation    Treatment effectiveness:  Post manipulation there is better intersegmental movement and Patient claims to feel looser post manipulation      Procedures:  CMT:  74740 Chiropractic manipulative treatment 1-2 regions performed   Cervical: Diversified, C2, C5 , Supine  Thoracic: Diversified, T5, T8,  Prone    Modalities:  09475: US:  1.7 Bundy/cm squared for 8 minutes at 1 mhz  Location: R R/C and cervical musculature     Therapeutic procedures:  55419: Neurological re-education/proprioception training and proper long term sitting posture: Corrected patient's seated posture when sitting for longer than 20 minutes or seated at the computer related to work duties for over 8 hours per day. Fit patient with Stacy lumbar support for postural re-training. Showed patient how to place the support correctly when seated and to increase usage by 2-3 hour increments per day until they are able to sit full time without spinal irritation with demonstration. Related improper vs. proper sitting to spinal biomechanics using the spine model with demonstration with the purpose of PREVENTING premature spinal degeneration from cumulative static motion. Demonstrated the increase in load and shearing forces on the spine in addition to the cumulative degenerative effects of axial compression on a spine that is chronically slumped and in an 'unlocked' position vs a 'locked' position. Gave cervical retraction for proper cervical alignment, anterior deep cervical flexor strengthening and proprioception training with demonstration. Per 10 minutes total        Response to Treatment  Reduction in symptoms as reported by patient    Prognosis: Good    Progress towards Goals: Patient is making progress towards the goal.Goals:  SLEEPING: the patient specific goal is to attain his pre-injury status of 8-12 hours comfortably  Lifting the R arm       Recommendations:    Instructions:none     Follow-up:  Return to care in 1 week or next available with check up  US therapy

## 2018-02-26 ENCOUNTER — THERAPY VISIT (OUTPATIENT)
Dept: CHIROPRACTIC MEDICINE | Facility: CLINIC | Age: 58
End: 2018-02-26
Payer: COMMERCIAL

## 2018-02-26 DIAGNOSIS — M99.02 THORACIC SEGMENT DYSFUNCTION: ICD-10-CM

## 2018-02-26 DIAGNOSIS — M99.01 CERVICAL SEGMENT DYSFUNCTION: ICD-10-CM

## 2018-02-26 DIAGNOSIS — M25.511 RIGHT SHOULDER PAIN, UNSPECIFIED CHRONICITY: ICD-10-CM

## 2018-02-26 DIAGNOSIS — M75.21 BICIPITAL TENDINITIS OF RIGHT SHOULDER: ICD-10-CM

## 2018-02-26 DIAGNOSIS — M54.2 CERVICALGIA: Primary | ICD-10-CM

## 2018-02-26 PROCEDURE — 98940 CHIROPRACT MANJ 1-2 REGIONS: CPT | Mod: AT | Performed by: CHIROPRACTOR

## 2018-02-26 PROCEDURE — 97035 APP MDLTY 1+ULTRASOUND EA 15: CPT | Performed by: CHIROPRACTOR

## 2018-02-26 NOTE — PROGRESS NOTES
Visit #:  4    Subjective:  Maggy Goldberg is a 58 year old female who is seen in f/u up for:        Cervical segment dysfunction  Cervicalgia  Thoracic segment dysfunction  Right shoulder pain, unspecified chronicity  Bicipital tendinitis of right shoulder.     Since last visit   Maggy Goldberg reports:    Area of chief complaint:  Cervical and Thoracic R shoulder :  Symptoms are graded at 0-2/10. The quality is described as stiff, achey, dull.  Motion has increased, but is still not normal. The pt reports at least 80% subjective improvement since initial presentation. She obtained new glasses which decreased head flexion and extension throughout the day. She notes it places less stress in the upper neck and shoulders. She notes R sided shoulder soreness and L side neck pain. The pt denies weakness or other unusual sx. She is pleased with her progress.       Since last visit the patient feels that they are 75% improved in the neck area and 65% improved in the R shoulder percent  improved from last visit.       Objective:  The following was observed:    P: pain elicited on palpation: C2, C5. T8, T9  A: static palpation demonstrates intersegmental asymmetry   R: motion palpation notes restricted motion  T: hypertonicity at: Levator scapulae and Sub-occipital R>>L    Segmental spinal dysfunction/restrictions found at:  : C2, C5. T8, T9      Assessment:    Diagnoses:      1. Cervical segment dysfunction    2. Cervicalgia    3. Thoracic segment dysfunction    4. Right shoulder pain, unspecified chronicity    5. Bicipital tendinitis of right shoulder        Patient @ [facility] and is released from my care     Treatment effectiveness:  Post manipulation there is better intersegmental movement and Patient claims to feel looser post manipulation      Procedures:  CMT:  91361 Chiropractic manipulative treatment 1-2 regions performed   Cervical: Diversified, C2, C5 , Supine  Thoracic: Diversified, T5, T8, Prone    Modalities:  50664:  US:  1.8 Bundy/cm squared for 8 minutes at 1 mhz  Location: R R/C and cervical musculature     Therapeutic procedures:  None     Response to Treatment  Reduction in symptoms as reported by patient    Prognosis: Good    Progress towards Goals: Patient is making progress towards the goal. The pt has reached all treatment goals     Goals:  SLEEPING: the patient specific goal is to attain his pre-injury status of 8-12 hours comfortably  Lifting the R arm       Recommendations:    Instructions:none     Follow-up:  Return to care none: pt is released from my care

## 2018-02-26 NOTE — MR AVS SNAPSHOT
After Visit Summary   2/26/2018    Maggy Goldberg    MRN: [id number]           Patient Information     Date Of Birth          1960        Visit Information        Provider Department      2/26/2018 4:00 PM Monster De La Cruz DC [facility]        Today's Diagnoses     Cervicalgia    -  1    Cervical segment dysfunction        Thoracic segment dysfunction        Right shoulder pain, unspecified chronicity        Bicipital tendinitis of right shoulder           Follow-ups after your visit        Who to contact     If you have questions or need follow up information about today's clinic visit or your schedule please contact [facility] directly at [phone].  Normal or non-critical lab and imaging results will be communicated to you by [email or web address], letter or phone within 4 business days after the clinic has received the results. If you do not hear from us within 7 days, please contact the clinic through [email or web address] or phone. If you have a critical or abnormal lab result, we will notify you by phone as soon as possible.  Submit refill requests through L[employer]ro or call your pharmacy and they will forward the refill request to us. Please allow 3 business days for your refill to be completed.          Additional Information About Your Visit        MyChart Information     [employer] gives you secure access to your electronic health record. If you see a primary care provider, you can also send messages to your care team and make appointments. If you have questions, please call your primary care clinic.  If you do not have a primary care provider, please call [phone] and they will assist you.        Care EveryWhere ID     This is your Care EveryWhere ID. This could be used by other organizations to access your [place] medical records  [id number]        Your Vitals Were     Last Period                   10/19/2010            Blood Pressure from Last 3 Encounters:   08/01/17 (!) 89/54   06/20/17 106/62    08/16/16 134/74    Weight from Last 3 Encounters:   08/01/17 81.6 kg (180 lb)   06/20/17 83.5 kg (184 lb)   08/16/16 89.4 kg (197 lb)              We Performed the Following     CHIROPRAC MANIP,SPINAL,1-2 REGIONS     ULTRASOUND THERAPY        Primary Care Provider Office Phone # Fax #    Stacia Rodriguez [phone] [address]  [facility] [address]        Equal Access to Services     ROMAN ARSHAD : Hadii aad ku hadasho Soomaali, waaxda luqadaha, qaybta kaalmada adeegyada, waxay idiin hayaan theresa patino . So [facility] [phone].    ATENCIÓN: Si habla español, tiene a chatterjee disposición servicios gratuitos de asistencia lingüística. Llame al [phone].    We comply with applicable federal civil rights laws and [place] laws. We do not discriminate on the basis of race, color, national origin, age, disability, sex, sexual orientation, or gender identity.            Thank you!     Thank you for choosing [facility]  for your care. Our goal is always to provide you with excellent care. Hearing back from our patients is one way we can continue to improve our services. Please take a few minutes to complete the written survey that you may receive in the mail after your visit with us. Thank you!             Your Updated Medication List - Protect others around you: Learn how to safely use, store and throw away your medicines at www.disposemymeds.org.          This list is accurate as of 2/26/18  4:48 PM.  Always use your most recent med list.                   Brand Name Dispense Instructions for use Diagnosis    aspirin 325 MG tablet      one daily        blood glucose monitoring lancets     100 each    1 lancet daily.    DM (diabetes mellitus) (H)       * blood glucose monitoring test strip    no brand specified    1 Box    1 strip by In Vitro route daily.    Type II or unspecified type diabetes mellitus without mention of complication, not stated as uncontrolled       * blood  glucose monitoring test strip    ACCU-CHEK SMARTVIEW    100 strip    To check glucose once daily or provide patient with strips and meter covered under her insurance.    DM (diabetes mellitus) (H)       clobetasol 0.05 % ointment    TEMOVATE    60 g    Apply  topically 2 times daily.    Psoriasis       clonazePAM 1 MG tablet    klonoPIN     Take 1 mg by mouth 2 times daily        * desonide 0.05 % ointment    DESOWEN    30 g    Apply topically 2 times daily    Psoriasis       * desonide 0.05 % ointment    DESOWEN    60 g    Apply topically 2 times daily    Plaque psoriasis       lisinopril 40 MG tablet    PRINIVIL/ZESTRIL    90 tablet    Take 1 tablet (40 mg) by mouth daily    Hypertension goal BP (blood pressure) < 140/90       metoprolol tartrate 50 MG tablet    LOPRESSOR    180 tablet    Take 1 tablet (50 mg) by mouth 2 times daily    Hypertension goal BP (blood pressure) < 140/90       OMEGA-3 FISH OIL PO      Take 1 g by mouth        order for DME     1 Device    1 Device continuous prn (and wear at HS also). One medium B&C thumbkeeper    Hand pain       psyllium 0.52 G capsule      Take 1 capsule by mouth daily.        triamterene-hydrochlorothiazide 37.5-25 MG per capsule    DYAZIDE    180 capsule    TAKE TWO CAPSULES BY MOUTH EVERY DAY    Hypertension goal BP (blood pressure) < 140/90, Hypopotassemia       * WELLBUTRIN 100 MG tablet   Generic drug:  buPROPion      Take by mouth daily        * buPROPion 150 MG 24 hr tablet    WELLBUTRIN XL          ZOLOFT PO      Take 200 mg by mouth daily        * Notice:  This list has 6 medication(s) that are the same as other medications prescribed for you. Read the directions carefully, and ask your doctor or other care provider to review them with you.

## 2018-03-22 ENCOUNTER — OFFICE VISIT (OUTPATIENT)
Dept: FAMILY MEDICINE | Facility: CLINIC | Age: 58
End: 2018-03-22
Payer: COMMERCIAL

## 2018-03-22 VITALS
BODY MASS INDEX: 31.18 KG/M2 | HEART RATE: 68 BPM | RESPIRATION RATE: 12 BRPM | OXYGEN SATURATION: 98 % | WEIGHT: 176 LBS | SYSTOLIC BLOOD PRESSURE: 115 MMHG | HEIGHT: 63 IN | TEMPERATURE: 98.5 F | DIASTOLIC BLOOD PRESSURE: 72 MMHG

## 2018-03-22 DIAGNOSIS — R73.9 ELEVATED BLOOD SUGAR: ICD-10-CM

## 2018-03-22 DIAGNOSIS — Z00.00 ENCOUNTER FOR ROUTINE ADULT HEALTH EXAMINATION WITHOUT ABNORMAL FINDINGS: Primary | ICD-10-CM

## 2018-03-22 DIAGNOSIS — E87.6 HYPOPOTASSEMIA: ICD-10-CM

## 2018-03-22 DIAGNOSIS — I10 HYPERTENSION GOAL BP (BLOOD PRESSURE) < 140/90: ICD-10-CM

## 2018-03-22 DIAGNOSIS — E78.00 HYPERCHOLESTEREMIA: ICD-10-CM

## 2018-03-22 LAB — HBA1C MFR BLD: 5.6 % (ref 4.3–6)

## 2018-03-22 PROCEDURE — 83036 HEMOGLOBIN GLYCOSYLATED A1C: CPT | Performed by: FAMILY MEDICINE

## 2018-03-22 PROCEDURE — 80048 BASIC METABOLIC PNL TOTAL CA: CPT | Performed by: FAMILY MEDICINE

## 2018-03-22 PROCEDURE — 82043 UR ALBUMIN QUANTITATIVE: CPT | Performed by: FAMILY MEDICINE

## 2018-03-22 PROCEDURE — 99396 PREV VISIT EST AGE 40-64: CPT | Performed by: FAMILY MEDICINE

## 2018-03-22 PROCEDURE — 36415 COLL VENOUS BLD VENIPUNCTURE: CPT | Performed by: FAMILY MEDICINE

## 2018-03-22 PROCEDURE — 80061 LIPID PANEL: CPT | Performed by: FAMILY MEDICINE

## 2018-03-22 RX ORDER — TRIAMTERENE AND HYDROCHLOROTHIAZIDE 37.5; 25 MG/1; MG/1
CAPSULE ORAL
Qty: 180 CAPSULE | Refills: 3 | Status: SHIPPED | OUTPATIENT
Start: 2018-03-22 | End: 2019-03-25

## 2018-03-22 RX ORDER — LISINOPRIL 40 MG/1
40 TABLET ORAL DAILY
Qty: 90 TABLET | Refills: 3 | Status: SHIPPED | OUTPATIENT
Start: 2018-03-22 | End: 2019-04-19

## 2018-03-22 RX ORDER — METOPROLOL TARTRATE 50 MG
50 TABLET ORAL 2 TIMES DAILY
Qty: 180 TABLET | Refills: 3 | Status: SHIPPED | OUTPATIENT
Start: 2018-03-22 | End: 2019-05-01

## 2018-03-22 ASSESSMENT — PATIENT HEALTH QUESTIONNAIRE - PHQ9
10. IF YOU CHECKED OFF ANY PROBLEMS, HOW DIFFICULT HAVE THESE PROBLEMS MADE IT FOR YOU TO DO YOUR WORK, TAKE CARE OF THINGS AT HOME, OR GET ALONG WITH OTHER PEOPLE: VERY DIFFICULT
SUM OF ALL RESPONSES TO PHQ QUESTIONS 1-9: 8
SUM OF ALL RESPONSES TO PHQ QUESTIONS 1-9: 8

## 2018-03-22 NOTE — MR AVS SNAPSHOT
After Visit Summary   3/22/2018    Maggy Goldberg    MRN: [id number]           Patient Information     Date Of Birth          1960        Visit Information        Provider Department      3/22/2018 11:20 AM Stacia Rodriguez MD [facility]        Today's Diagnoses     Encounter for routine adult health examination without abnormal findings    -  1    Hypertension goal BP (blood pressure) < 140/90        Hypercholesteremia        Elevated blood sugar        Hypopotassemia          Care Instructions      Preventive Health Recommendations  Female Ages 50 - 64    Yearly exam: See your health care provider every year in order to  o Review health changes.   o Discuss preventive care.    o Review your medicines if your doctor has prescribed any.      Get a Pap test every three years (unless you have an abnormal result and your provider advises testing more often).    If you get Pap tests with HPV test, you only need to test every 5 years, unless you have an abnormal result.     You do not need a Pap test if your uterus was removed (hysterectomy) and you have not had cancer.    You should be tested each year for STDs (sexually transmitted diseases) if you're at risk.     Have a mammogram every 1 to 2 years.    Have a colonoscopy at age 50, or have a yearly FIT test (stool test). These exams screen for colon cancer.      Have a cholesterol test every 5 years, or more often if advised.    Have a diabetes test (fasting glucose) every three years. If you are at risk for diabetes, you should have this test more often.     If you are at risk for osteoporosis (brittle bone disease), think about having a bone density scan (DEXA).    Shots: Get a flu shot each year. Get a tetanus shot every 10 years.    Nutrition:     Eat at least 5 servings of fruits and vegetables each day.    Eat whole-grain bread, whole-wheat pasta and brown rice instead of white grains and rice.    Talk to your provider about  "Calcium and Vitamin D.     Lifestyle    Exercise at least 150 minutes a week (30 minutes a day, 5 days a week). This will help you control your weight and prevent disease.    Limit alcohol to one drink per day.    No smoking.     Wear sunscreen to prevent skin cancer.     See your dentist every six months for an exam and cleaning.    See your eye doctor every 1 to 2 years.            Follow-ups after your visit        Who to contact     If you have questions or need follow up information about today's clinic visit or your schedule please contact [facility] directly at [phone].  Normal or non-critical lab and imaging results will be communicated to you by MyChart, letter or phone within 4 business days after the clinic has received the results. If you do not hear from us within 7 days, please contact the clinic through [employer] or phone. If you have a critical or abnormal lab result, we will notify you by phone as soon as possible.  Submit refill requests through Planview or call your pharmacy and they will forward the refill request to us. Please allow 3 business days for your refill to be completed.          Additional Information About Your Visit        MyChart Information     [employer] gives you secure access to your electronic health record. If you see a primary care provider, you can also send messages to your care team and make appointments. If you have questions, please call your primary care clinic.  If you do not have a primary care provider, please call [phone] and they will assist you.        Care EveryWhere ID     This is your Care EveryWhere ID. This could be used by other organizations to access your [place] medical records  [id number]        Your Vitals Were     Pulse Temperature Respirations Height Last Period Pulse Oximetry    68 98.5  F (36.9  C) (Tympanic) 12 1.6 m (5' 3\") 10/19/2010 98%    BMI (Body Mass Index)                   31.18 kg/m2            Blood Pressure from " Last 3 Encounters:   03/22/18 115/72   08/01/17 (!) 89/54   06/20/17 106/62    Weight from Last 3 Encounters:   03/22/18 79.8 kg (176 lb)   08/01/17 81.6 kg (180 lb)   06/20/17 83.5 kg (184 lb)              We Performed the Following     Albumin Random Urine Quantitative with Creat Ratio     Basic metabolic panel  (Ca, Cl, CO2, Creat, Gluc, K, Na, BUN)     Hemoglobin A1c     Lipid panel reflex to direct LDL Fasting          Where to get your medicines      These medications were sent to [facility] [place], [place] - [address]  [address], [facility] [address]     Phone:  [phone]     lisinopril 40 MG tablet    metoprolol tartrate 50 MG tablet    triamterene-hydrochlorothiazide 37.5-25 MG per capsule          Primary Care Provider Office Phone # Fax #    Stacia Rodriguez [phone]       [address]  [facility] [address]        Equal Access to Services     [facility]: Hadii ramesh ku hadasho Soomaali, waaxda luqadaha, qaybta kaalmada adeegyavincenzo, ted patino . So [facility] [phone].    ATENCIÓN: Si habla español, tiene a chatterjee disposición servicios gratuitos de asistencia lingüística. Llame al [phone].    We comply with applicable federal civil rights laws and [place] laws. We do not discriminate on the basis of race, color, national origin, age, disability, sex, sexual orientation, or gender identity.            Thank you!     Thank you for choosing [facility]  for your care. Our goal is always to provide you with excellent care. Hearing back from our patients is one way we can continue to improve our services. Please take a few minutes to complete the written survey that you may receive in the mail after your visit with us. Thank you!             Your Updated Medication List - Protect others around you: Learn how to safely use, store and throw away your medicines at www.disposemymeds.org.          This list is accurate  as of 3/22/18 11:43 AM.  Always use your most recent med list.                   Brand Name Dispense Instructions for use Diagnosis    aspirin 325 MG tablet      one daily        blood glucose monitoring lancets     100 each    1 lancet daily.    DM (diabetes mellitus) (H)       * blood glucose monitoring test strip    no brand specified    1 Box    1 strip by In Vitro route daily.    Type II or unspecified type diabetes mellitus without mention of complication, not stated as uncontrolled       * blood glucose monitoring test strip    ACCU-CHEK SMARTVIEW    100 strip    To check glucose once daily or provide patient with strips and meter covered under her insurance.    DM (diabetes mellitus) (H)       clobetasol 0.05 % ointment    TEMOVATE    60 g    Apply  topically 2 times daily.    Psoriasis       clonazePAM 1 MG tablet    klonoPIN     Take 1 mg by mouth 2 times daily        * desonide 0.05 % ointment    DESOWEN    30 g    Apply topically 2 times daily    Psoriasis       * desonide 0.05 % ointment    DESOWEN    60 g    Apply topically 2 times daily    Plaque psoriasis       lisinopril 40 MG tablet    PRINIVIL/ZESTRIL    90 tablet    Take 1 tablet (40 mg) by mouth daily    Hypertension goal BP (blood pressure) < 140/90       metoprolol tartrate 50 MG tablet    LOPRESSOR    180 tablet    Take 1 tablet (50 mg) by mouth 2 times daily    Hypertension goal BP (blood pressure) < 140/90       OMEGA-3 FISH OIL PO      Take 1 g by mouth        order for DME     1 Device    1 Device continuous prn (and wear at HS also). One medium B&C thumbkeeper    Hand pain       psyllium 0.52 G capsule      Take 1 capsule by mouth daily.        triamterene-hydrochlorothiazide 37.5-25 MG per capsule    DYAZIDE    180 capsule    TAKE TWO CAPSULES BY MOUTH EVERY DAY    Hypertension goal BP (blood pressure) < 140/90, Hypopotassemia       * WELLBUTRIN 100 MG tablet   Generic drug:  buPROPion      Take by mouth daily        * buPROPion 150 MG  24 hr tablet    WELLBUTRIN XL          ZOLOFT PO      Take 200 mg by mouth daily        * Notice:  This list has 6 medication(s) that are the same as other medications prescribed for you. Read the directions carefully, and ask your doctor or other care provider to review them with you.

## 2018-03-22 NOTE — PROGRESS NOTES
SUBJECTIVE:   CC: Maggy Goldberg is an 58 year old woman who presents for preventive health visit.     Physical   Annual:     Getting at least 3 servings of Calcium per day::  Yes    Bi-annual eye exam::  Yes    Dental care twice a year::  NO    Sleep apnea or symptoms of sleep apnea::  None    Diet::  Regular (no restrictions)    Frequency of exercise::  2-3 days/week    Duration of exercise::  30-45 minutes    Taking medications regularly::  Yes    Medication side effects::  None    Additional concerns today::  No            Patient reports recently quitting her job due to anxiety and stress associated with it and so she is getting er preventive care visit before her insurance stops. She reports not going to the dentist regularly (unless she cracks a tooth.) she reports having sweats early in the morning, occurring right before she wakes up (7hrs after sleeping). Has been occurring for years now without associated fever or unexpected weight changes. She reports that her allergies are presenting again. She uses a normal OTC saline nasal spray. She is going to chiropractor for her shoulder pain. Her psoriasis is minimally active. She reports left hand numbness when she rests her elbow on her rocking chair, resolves when off her rocking chair. She is glad she is out of her job- intense stress relief. Just refinanced house, but her nephew is helping her with an updated resume, unfortunately he has been diagnosed with testicular cancer.     Today's PHQ-2 Score:   PHQ-2 ( 1999 [employer]) 3/22/2018   Q1: Little interest or pleasure in doing things 2   Q2: Feeling down, depressed or hopeless 1   PHQ-2 Score 3   Q1: Little interest or pleasure in doing things More than half the days   Q2: Feeling down, depressed or hopeless Several days   PHQ-2 Score 3   Answers for HPI/ROS submitted by the patient on 3/22/2018   PHQ-2 Score: 3  If you checked off any problems, how difficult have these problems made it for you to do your  work, take care of things at home, or get along with other people?: Very difficult  PHQ9 TOTAL SCORE: 8    Abuse: Current or Past(Physical, Sexual or Emotional)- No  Do you feel safe in your environment - Yes    Social History   Substance Use Topics     Smoking status: Current Some Day Smoker     Types: Cigarettes     Smokeless tobacco: Never Used      Comment: 1/2 PPD     Alcohol use Yes      Comment: a couple per day     Alcohol Use 3/22/2018   If you drink alcohol do you typically have greater than 3 drinks per day OR greater than 7 drinks per week? No   No flowsheet data found.    Reviewed orders with patient.  Reviewed health maintenance and updated orders accordingly - Yes  BP Readings from Last 3 Encounters:   03/22/18 115/72   08/01/17 (!) 89/54   06/20/17 106/62    Wt Readings from Last 3 Encounters:   03/22/18 79.8 kg (176 lb)   08/01/17 81.6 kg (180 lb)   06/20/17 83.5 kg (184 lb)                  Patient Active Problem List   Diagnosis     Adjustment disorder with mixed anxiety and depressed mood     Contact dermatitis and other eczema, due to unspecified cause     Allergic rhinitis     CARDIOVASCULAR SCREENING; LDL GOAL LESS THAN 100     CMC arthritis, thumb, degenerative     OCD (obsessive compulsive disorder)     Hypertension goal BP (blood pressure) < 140/90     Atypical nevi     Psoriasis     Hypercholesteremia     Lentigo     Plaque psoriasis     Lipoma of lower extremity, unspecified laterality     Achrochordon     Dermatofibroma     Skin cancer screening     Cervical segment dysfunction     Cervicalgia     Thoracic segment dysfunction     Right shoulder pain, unspecified chronicity     Bicipital tendinitis of right shoulder     History reviewed. No pertinent surgical history.    Social History   Substance Use Topics     Smoking status: Current Some Day Smoker     Types: Cigarettes     Smokeless tobacco: Never Used      Comment: 1/2 PPD     Alcohol use Yes      Comment: a couple per day     Family  History   Problem Relation Age of Onset     CANCER Mother      skin cancer     Depression Mother      OSTEOPOROSIS Mother      Hypertension Father      CANCER Father       of lung cancer     Obesity Father      DIABETES Maternal Grandmother      Alcohol/Drug Paternal Grandfather      alcohol     Asthma Maternal Uncle      Blood Disease Son      kasandra nassar     CANCER Maternal Aunt      not sure what     CANCER Maternal Aunt      not sure what     Depression Brother      Thyroid Disease Paternal Uncle          Current Outpatient Prescriptions   Medication Sig Dispense Refill     metoprolol tartrate (LOPRESSOR) 50 MG tablet Take 1 tablet (50 mg) by mouth 2 times daily 180 tablet 3     lisinopril (PRINIVIL/ZESTRIL) 40 MG tablet Take 1 tablet (40 mg) by mouth daily 90 tablet 3     triamterene-hydrochlorothiazide (DYAZIDE) 37.5-25 MG per capsule TAKE TWO CAPSULES BY MOUTH EVERY  capsule 3     desonide (DESOWEN) 0.05 % ointment Apply topically 2 times daily 60 g 3     buPROPion (WELLBUTRIN XL) 150 MG 24 hr tablet        buPROPion (WELLBUTRIN) 100 MG tablet Take by mouth daily       clonazePAM (KLONOPIN) 1 MG tablet Take 1 mg by mouth 2 times daily       Omega-3 Fatty Acids (OMEGA-3 FISH OIL PO) Take 1 g by mouth       Sertraline HCl (ZOLOFT PO) Take 200 mg by mouth daily       desonide (DESOWEN) 0.05 % ointment Apply topically 2 times daily 30 g 0     clobetasol (TEMOVATE) 0.05 % ointment Apply  topically 2 times daily. 60 g 0     ACCU-CHEK FASTCLIX LANCETS MISC 1 lancet daily. 100 each prn     glucose blood VI test strips (ACCU-CHEK SMARTVIEW) strip To check glucose once daily or provide patient with strips and meter covered under her insurance. 100 strip prn     glucose blood VI test strips strip 1 strip by In Vitro route daily. 1 Box 12     psyllium 0.52 GM capsule Take 1 capsule by mouth daily.       ORDER FOR DME 1 Device continuous prn (and wear at HS also). One medium B&C thumbkeeper 1 Device 0      ASPIRIN 325 MG OR TABS one daily       [DISCONTINUED] metoprolol (LOPRESSOR) 50 MG tablet Take 1 tablet (50 mg) by mouth 2 times daily 180 tablet 2     [DISCONTINUED] lisinopril (PRINIVIL/ZESTRIL) 40 MG tablet Take 1 tablet (40 mg) by mouth daily 90 tablet 3     [DISCONTINUED] triamterene-hydrochlorothiazide (DYAZIDE) 37.5-25 MG per capsule TAKE TWO CAPSULES BY MOUTH EVERY  capsule 3     Allergies   Allergen Reactions     Pertussis Vaccine      Amoxicillin Rash     Clindamycin Rash     Recent Labs   Lab Test  08/01/17   1611  06/20/17   1339  04/13/17   1227  08/16/16   1441   11/19/15   0953   10/07/14   1611  05/15/14   0905  02/24/14   1217   10/21/11   0950   A1C   --    --    --    --    --   6.0   --   6.1*   --   6.2*   < >  6.3*   LDL   --    --   118*   --    --   119*   --    --   98  127   < >   --    HDL   --    --   80   --    --   83   --    --   61  59   < >   --    TRIG   --    --   154*   --    --   178*   --    --   201*  228*   < >   --    ALT  23   --    --   28   --    --    --    --    --   39   < >   --    CR  0.88  0.95   --   0.76   < >  0.81   < >  0.73   --   0.63   < >   --    GFRESTIMATED  66  60*   --   79   < >  73   < >  83   --   >90   < >   --    GFRESTBLACK  80  73   --   >90   GFR Calc     < >  89   < >  >90   GFR Calc     --   >90   < >   --    POTASSIUM  3.5  3.9   --   3.5   < >  4.2   < >  3.2*   --   3.4   < >   --    TSH   --    --    --    --    --    --    --    --    --   1.92   --   1.23    < > = values in this interval not displayed.        Patient over age 50, mutual decision to screen reflected in health maintenance.    Pertinent mammograms are reviewed under the imaging tab.  History of abnormal Pap smear:   NO - age 30- 65 PAP every 3 years recommended  Last 3 Pap and HPV Results:   PAP / HPV Latest Ref Rng & Units 5/2/2016 12/19/2012 8/19/2011   PAP - NIL NIL ASC-US(A)   HPV 16 DNA NEG Negative - -   HPV 18 DNA NEG Negative -  "-   OTHER HR HPV NEG Negative - -       Reviewed and updated as needed this visit by clinical staff  Tobacco  Allergies  Meds  Med Hx  Surg Hx  Fam Hx  Soc Hx        Reviewed and updated as needed this visit by Provider        Past Medical History:   Diagnosis Date     Adjustment disorder with mixed anxiety and depressed mood      CARDIOVASCULAR SCREENING; LDL GOAL LESS THAN 100 5/9/2010     CARDIOVASCULAR SCREENING; LDL GOAL LESS THAN 160      CMC arthritis, thumb, degenerative      Contact dermatitis and other eczema, due to unspecified cause     contact and exzema     HTN (hypertension)      Hypercholesteremia      Hypertension goal BP (blood pressure) < 140/90      Iritis 2010     OCD (obsessive compulsive disorder)      Psoriasis      Type II or unspecified type diabetes mellitus without mention of complication, not stated as uncontrolled      Uncomplicated type 2 diabetes mellitus (H) 10/14/2011      History reviewed. No pertinent surgical history.  Obstetric History     No data available          Review of Systems  ROS: 10 point ROS neg other than the symptoms noted above in the HPI.     OBJECTIVE:   /72  Pulse 68  Temp 98.5  F (36.9  C) (Tympanic)  Resp 12  Ht 5' 3\" (1.6 m)  Wt 176 lb (79.8 kg)  LMP 10/19/2010  SpO2 98%  BMI 31.18 kg/m2  Physical Exam   GENERAL: healthy, alert and no distress  EYES: Eyes grossly normal to inspection, PERRL and conjunctivae and sclerae normal  HENT: ear canals and TM's normal, nose and mouth without ulcers or lesions  NECK: no adenopathy, no asymmetry, masses, or scars and thyroid normal to palpation  RESP: lungs clear to auscultation - no rales, rhonchi or wheezes  BREAST: normal without masses, tenderness or nipple discharge and no palpable axillary masses or adenopathy  CV: regular rate and rhythm, normal S1 S2, no S3 or S4, no murmur, click or rub, no peripheral edema and peripheral pulses strong  ABDOMEN: soft, nontender, no hepatosplenomegaly, no " masses and bowel sounds normal  MS: no gross musculoskeletal defects noted, no edema  SKIN: no suspicious lesions or rashes  NEURO: Normal strength and tone, mentation intact and speech normal  PSYCH: mentation appears normal, affect normal/bright      ASSESSMENT/PLAN:   1. Encounter for routine adult health examination without abnormal findings  Mammogram is UTD, Next pap due 2019  FIT test due in 9/18     2. Hypertension goal BP (blood pressure) < 140/90  Controlled, patient continues on metoprolol 50 mg BID, lisinopril 40mg daily, and dyazide 37.5-25mg  2 capsules daily.  - Basic metabolic panel  (Ca, Cl, CO2, Creat, Gluc, K, Na, BUN)  - Albumin Random Urine Quantitative with Creat Ratio  - metoprolol tartrate (LOPRESSOR) 50 MG tablet; Take 1 tablet (50 mg) by mouth 2 times daily  Dispense: 180 tablet; Refill: 3  - lisinopril (PRINIVIL/ZESTRIL) 40 MG tablet; Take 1 tablet (40 mg) by mouth daily  Dispense: 90 tablet; Refill: 3  - triamterene-hydrochlorothiazide (DYAZIDE) 37.5-25 MG per capsule; TAKE TWO CAPSULES BY MOUTH EVERY DAY  Dispense: 180 capsule; Refill: 3    3. Hypercholesteremia     - Lipid panel reflex to direct LDL Fasting    4. Elevated blood sugar     - Hemoglobin A1c    5. Hypopotassemia   patient continues on dyazide 37.5-25 mg BID  - triamterene-hydrochlorothiazide (DYAZIDE) 37.5-25 MG per capsule; TAKE TWO CAPSULES BY MOUTH EVERY DAY  Dispense: 180 capsule; Refill: 3      Preventive Health Recommendations  Female Ages 50 - 64    Yearly exam: See your health care provider every year in order to  o Review health changes.   o Discuss preventive care.    o Review your medicines if your doctor has prescribed any.      Get a Pap test every three years (unless you have an abnormal result and your provider advises testing more often).    If you get Pap tests with HPV test, you only need to test every 5 years, unless you have an abnormal result.     You do not need a Pap test if your uterus was removed  "(hysterectomy) and you have not had cancer.    You should be tested each year for STDs (sexually transmitted diseases) if you're at risk.     Have a mammogram every 1 to 2 years.    Have a colonoscopy at age 50, or have a yearly FIT test (stool test). These exams screen for colon cancer.      Have a cholesterol test every 5 years, or more often if advised.    Have a diabetes test (fasting glucose) every three years. If you are at risk for diabetes, you should have this test more often.     If you are at risk for osteoporosis (brittle bone disease), think about having a bone density scan (DEXA).    Shots: Get a flu shot each year. Get a tetanus shot every 10 years.    Nutrition:     Eat at least 5 servings of fruits and vegetables each day.    Eat whole-grain bread, whole-wheat pasta and brown rice instead of white grains and rice.    Talk to your provider about Calcium and Vitamin D.     Lifestyle    Exercise at least 150 minutes a week (30 minutes a day, 5 days a week). This will help you control your weight and prevent disease.    Limit alcohol to one drink per day.    No smoking.     Wear sunscreen to prevent skin cancer.     See your dentist every six months for an exam and cleaning.    See your eye doctor every 1 to 2 years.      COUNSELING:  Reviewed preventive health counseling, as reflected in patient instructions       reports that she has been smoking Cigarettes.  She has never used smokeless tobacco.  Tobacco Cessation Action Plan: Information offered: Patient not interested at this time  Estimated body mass index is 31.18 kg/(m^2) as calculated from the following:    Height as of this encounter: 5' 3\" (1.6 m).    Weight as of this encounter: 176 lb (79.8 kg).   Weight management plan: Discussed healthy diet and exercise guidelines and patient will follow up in 12 months in clinic to re-evaluate.    Counseling Resources:  ATP IV Guidelines  Pooled Cohorts Equation Calculator  Breast Cancer Risk " Calculator  FRAX Risk Assessment  ICSI Preventive Guidelines  Dietary Guidelines for Americans, 2010  USDA's MyPlate  ASA Prophylaxis  Lung CA Screening    The information in this document, created by the medical scribe for me, accurately reflects the services I personally performed and the decisions made by me. I have reviewed and approved this document for accuracy. 03/22/18    Stacia Rodriguez MD  [facility]    3/18

## 2018-03-22 NOTE — LETTER
March 26, 2018      Maggy Goldberg  [address] [facility] [place] [address]        Dear ,    We are writing to inform you of your test results.    The results of your recent lipid (cholesterol) profile were abnormal.    Here are the results:  Lab Results       Component                Value               Date                       CHOL                     321                 03/22/2018            Lab Results       Component                Value               Date                       HDL                      68                  03/22/2018            Lab Results       Component                Value               Date                       LDL                      219                 03/22/2018            Lab Results       Component                Value               Date                       TRIG                     168                 03/22/2018            Lab Results       Component                Value               Date                       CHOLHDLRATIO             3.3                 05/15/2014              Desired or goal levels are:  CHOLESTEROL: Desirable is less than 200.   HDL (Good Cholesterol): Desirable is greater than 40 (for men) greater than 50 (for women).  LDL (Bad Cholesterol): Desirable is less than 130 (or less than 100 if you have heart disease or diabetes). Borderline 130-160.  TRIGLYCERIDES: Desirable is less than 150.  Borderline is 150-200.        **Your 10-year heart disease risk score, which is calculated using your risk factors for heart disease, is  8.8%. When the risk score is 5% or higher, it is recommended that we consider starting a statin (cholesterol-lowering) medication to reduce your risk of heart attack and stroke. Please schedule a visit with me to discuss starting this medication if you are open to doing so.        As you may know, an elevated cholesterol is one factor that increases your risk for heart disease and stroke. You can improve your cholesterol by  controlling the amount and type of fat you eat and by increasing your daily activity level.    Here are some ways to improve your nutrition:  Eat less fat (especially butter, Crisco and other saturated fats)  Buy lean cuts of meat, reduce your portions of red meat or substitute poultry or fish  Use skim milk and low-fat dairy products  Eat no more than 4 egg yolks per week  Avoid fried or fast foods that are high in fat  Eat more fruits and vegetables      Also consider starting or increasing your aerobic activity. Aerobic activity is the best way to improve HDL (good) cholesterol. If this would be new to you, please talk with me first about what activities are safe for you.      Other lab results:    The testing of your kidney function, and electrolytes was normal.      Your blood glucose (blood sugar) was also slightly elevated. You do not have diabetes yet but we consider this a pre-diabetic state.  I would recommend rechecking your blood glucose in one year. Improving lifestyle habits such as regular exercise, good diet and weight loss will hopefully slow or stop the progression towards diabetes.     Your urine protein test was normal.     Please feel free to contact us with any questions or if you would like more information.    Resulted Orders   Basic metabolic panel  (Ca, Cl, CO2, Creat, Gluc, K, Na, BUN)   Result Value Ref Range    Sodium 139 133 - 144 mmol/L    Potassium 4.0 3.4 - 5.3 mmol/L    Chloride 106 94 - 109 mmol/L    Carbon Dioxide 26 20 - 32 mmol/L    Anion Gap 7 3 - 14 mmol/L    Glucose 126 (H) 70 - 99 mg/dL      Comment:      Fasting specimen    Urea Nitrogen 20 7 - 30 mg/dL    Creatinine 0.93 0.52 - 1.04 mg/dL    GFR Estimate 62 >60 mL/min/1.7m2      Comment:      Non  GFR Calc    GFR Estimate If Black 75 >60 mL/min/1.7m2      Comment:       GFR Calc    Calcium 9.2 8.5 - 10.1 mg/dL   Hemoglobin A1c   Result Value Ref Range    Hemoglobin A1C 5.6 4.3 - 6.0 %    Lipid panel reflex to direct LDL Fasting   Result Value Ref Range    Cholesterol 321 (H) <200 mg/dL      Comment:      Desirable:       <200 mg/dl    Triglycerides 168 (H) <150 mg/dL      Comment:      Borderline high:  150-199 mg/dl  High:             200-499 mg/dl  Very high:       >499 mg/dl  Fasting specimen      HDL Cholesterol 68 >49 mg/dL    LDL Cholesterol Calculated 219 (H) <100 mg/dL      Comment:      Above desirable:  100-129 mg/dl  Borderline High:  130-159 mg/dL  High:             160-189 mg/dL  Very high:       >189 mg/dl      Non HDL Cholesterol 253 (H) <130 mg/dL      Comment:      Above Desirable:  130-159 mg/dl  Borderline high:  160-189 mg/dl  High:             190-219 mg/dl  Very high:       >219 mg/dl     Albumin Random Urine Quantitative with Creat Ratio   Result Value Ref Range    Creatinine Urine 227 mg/dL    Albumin Urine mg/L 38 mg/L    Albumin Urine mg/g Cr 16.52 0 - 25 mg/g Cr       If you have any questions or concerns, please call the clinic at the number listed above.       Sincerely,        Stacia Rodriguez MD/nr

## 2018-03-23 LAB
ANION GAP SERPL CALCULATED.3IONS-SCNC: 7 MMOL/L (ref 3–14)
BUN SERPL-MCNC: 20 MG/DL (ref 7–30)
CALCIUM SERPL-MCNC: 9.2 MG/DL (ref 8.5–10.1)
CHLORIDE SERPL-SCNC: 106 MMOL/L (ref 94–109)
CHOLEST SERPL-MCNC: 321 MG/DL
CO2 SERPL-SCNC: 26 MMOL/L (ref 20–32)
CREAT SERPL-MCNC: 0.93 MG/DL (ref 0.52–1.04)
CREAT UR-MCNC: 227 MG/DL
GFR SERPL CREATININE-BSD FRML MDRD: 62 ML/MIN/1.7M2
GLUCOSE SERPL-MCNC: 126 MG/DL (ref 70–99)
HDLC SERPL-MCNC: 68 MG/DL
LDLC SERPL CALC-MCNC: 219 MG/DL
MICROALBUMIN UR-MCNC: 38 MG/L
MICROALBUMIN/CREAT UR: 16.52 MG/G CR (ref 0–25)
NONHDLC SERPL-MCNC: 253 MG/DL
POTASSIUM SERPL-SCNC: 4 MMOL/L (ref 3.4–5.3)
SODIUM SERPL-SCNC: 139 MMOL/L (ref 133–144)
TRIGL SERPL-MCNC: 168 MG/DL

## 2018-03-23 ASSESSMENT — PATIENT HEALTH QUESTIONNAIRE - PHQ9: SUM OF ALL RESPONSES TO PHQ QUESTIONS 1-9: 8

## 2018-03-23 NOTE — PROGRESS NOTES
The results of your recent lipid (cholesterol) profile were abnormal.    Here are the results:  Lab Results       Component                Value               Date                       CHOL                     321                 03/22/2018            Lab Results       Component                Value               Date                       HDL                      68                  03/22/2018            Lab Results       Component                Value               Date                       LDL                      219                 03/22/2018            Lab Results       Component                Value               Date                       TRIG                     168                 03/22/2018            Lab Results       Component                Value               Date                       CHOLHDLRATIO             3.3                 05/15/2014              Desired or goal levels are:  CHOLESTEROL: Desirable is less than 200.   HDL (Good Cholesterol): Desirable is greater than 40 (for men) greater than 50 (for women).  LDL (Bad Cholesterol): Desirable is less than 130 (or less than 100 if you have heart disease or diabetes). Borderline 130-160.  TRIGLYCERIDES: Desirable is less than 150.  Borderline is 150-200.        **Your 10-year heart disease risk score, which is calculated using your risk factors for heart disease, is  8.8%. When the risk score is 5% or higher, it is recommended that we consider starting a statin (cholesterol-lowering) medication to reduce your risk of heart attack and stroke. Please schedule a visit with me to discuss starting this medication if you are open to doing so.        As you may know, an elevated cholesterol is one factor that increases your risk for heart disease and stroke. You can improve your cholesterol by controlling the amount and type of fat you eat and by increasing your daily activity level.    Here are some ways to improve your nutrition:  Eat less fat  (especially butter, Crisco and other saturated fats)  Buy lean cuts of meat, reduce your portions of red meat or substitute poultry or fish  Use skim milk and low-fat dairy products  Eat no more than 4 egg yolks per week  Avoid fried or fast foods that are high in fat  Eat more fruits and vegetables      Also consider starting or increasing your aerobic activity. Aerobic activity is the best way to improve HDL (good) cholesterol. If this would be new to you, please talk with me first about what activities are safe for you.      Other lab results:    The testing of your kidney function, and electrolytes was normal.      Your blood glucose (blood sugar) was also slightly elevated. You do not have diabetes yet but we consider this a pre-diabetic state.  I would recommend rechecking your blood glucose in one year. Improving lifestyle habits such as regular exercise, good diet and weight loss will hopefully slow or stop the progression towards diabetes.     Your urine protein test was normal.     Please feel free to contact us with any questions or if you would like more information.

## 2018-08-24 ENCOUNTER — OFFICE VISIT (OUTPATIENT)
Dept: DERMATOLOGY | Facility: CLINIC | Age: 58
End: 2018-08-24
Payer: COMMERCIAL

## 2018-08-24 DIAGNOSIS — Z12.83 SKIN CANCER SCREENING: Primary | ICD-10-CM

## 2018-08-24 DIAGNOSIS — L40.9 PSORIASIS: ICD-10-CM

## 2018-08-24 RX ORDER — TACROLIMUS 1 MG/G
OINTMENT TOPICAL
Qty: 60 G | Refills: 3 | Status: SHIPPED | OUTPATIENT
Start: 2018-08-24 | End: 2019-10-15

## 2018-08-24 RX ORDER — DESONIDE 0.5 MG/G
CREAM TOPICAL
Qty: 60 G | Refills: 3 | Status: SHIPPED | OUTPATIENT
Start: 2018-08-24 | End: 2019-10-15

## 2018-08-24 RX ORDER — CLOBETASOL PROPIONATE 0.5 MG/G
CREAM TOPICAL
Qty: 60 G | Refills: 3 | Status: SHIPPED | OUTPATIENT
Start: 2018-08-24 | End: 2019-10-15

## 2018-08-24 ASSESSMENT — PAIN SCALES - GENERAL: PAINLEVEL: NO PAIN (0)

## 2018-08-24 NOTE — NURSING NOTE
Chief Complaint   Patient presents with     Skin Check     Full body skin exam     Psoriasis     Leah Inman CMA

## 2018-08-24 NOTE — MR AVS SNAPSHOT
After Visit Summary   2018    Maggy Goldberg    MRN: [id number]           Patient Information     Date Of Birth          1960        Visit Information        Provider Department      2018 2:30 PM Munira Florence PA-C [facility] Dermatology        Today's Diagnoses     Psoriasis           Follow-ups after your visit        Who to contact     Please call your clinic at [phone] to:    Ask questions about your health    Make or cancel appointments    Discuss your medicines    Learn about your test results    Speak to your doctor            Additional Information About Your Visit        MyChart Information     [employer] is an electronic gateway that provides easy, online access to your medical records. With [employer], you can request a clinic appointment, read your test results, renew a prescription or communicate with your care team.     To sign up for [employer] visit the website at [email or web address]   You will be asked to enter the access code listed below, as well as some personal information. Please follow the directions to create your username and password.     Your access code is: [email or web address]  Expires: 2018  6:30 AM     Your access code will  in 90 days. If you need help or a new code, please contact your [facility] or call [phone] for assistance.        Care EveryWhere ID     This is your Care EveryWhere ID. This could be used by other organizations to access your [place] medical records  [id number]        Your Vitals Were     Last Period                   10/19/2010            Blood Pressure from Last 3 Encounters:   18 115/72   17 (!) 89/54   17 106/62    Weight from Last 3 Encounters:   18 79.8 kg (176 lb)   17 81.6 kg (180 lb)   17 83.5 kg (184 lb)              Today, you had the following     No orders found for display         Today's Medication Changes          These changes are  accurate as of 8/24/18  2:53 PM.  If you have any questions, ask your nurse or doctor.               Start taking these medicines.        Dose/Directions    tacrolimus 0.1 % ointment   Commonly known as:  PROTOPIC   Used for:  Psoriasis   Started by:  Munira Florence PA-C        Apply topically BID to AA - alternating with topical corticosteroids   Quantity:  60 g   Refills:  3         These medicines have changed or have updated prescriptions.        Dose/Directions    * clobetasol 0.05 % ointment   Commonly known as:  TEMOVATE   This may have changed:  Another medication with the same name was added. Make sure you understand how and when to take each.   Used for:  Psoriasis   Changed by:  Munira Florence PA-C        Apply  topically 2 times daily.   Quantity:  60 g   Refills:  0       * clobetasol 0.05 % cream   Commonly known as:  TEMOVATE   This may have changed:  You were already taking a medication with the same name, and this prescription was added. Make sure you understand how and when to take each.   Used for:  Psoriasis   Changed by:  Munira Florence PA-C        Apply topically BID to AA on arms and legs, avoid face, neck and genital area   Quantity:  60 g   Refills:  3       * desonide 0.05 % ointment   Commonly known as:  DESOWEN   This may have changed:  Another medication with the same name was added. Make sure you understand how and when to take each.   Used for:  Psoriasis   Changed by:  Munira Florence PA-C        Apply topically 2 times daily   Quantity:  30 g   Refills:  0       * desonide 0.05 % ointment   Commonly known as:  DESOWEN   This may have changed:  Another medication with the same name was added. Make sure you understand how and when to take each.   Used for:  Plaque psoriasis   Changed by:  Munira Florence PA-C        Apply topically 2 times daily   Quantity:  60 g   Refills:  3       * desonide 0.05 % cream   Commonly known as:  DESOWEN   This may have changed:  You were  already taking a medication with the same name, and this prescription was added. Make sure you understand how and when to take each.   Used for:  Psoriasis   Changed by:  Munira Florence, GABRIELE        Apply topically BID to the nose   Quantity:  60 g   Refills:  3       * Notice:  This list has 5 medication(s) that are the same as other medications prescribed for you. Read the directions carefully, and ask your doctor or other care provider to review them with you.         Where to get your medicines      These medications were sent to [facility] [address]  [address] [facility] [place] [address]     Phone:  [phone]     clobetasol 0.05 % cream    desonide 0.05 % cream    tacrolimus 0.1 % ointment                Primary Care Provider Office Phone # Fax #    Stacia Rodriguez [phone]       [address]  [facility] [address]        Equal Access to Services     ROMAN ARSHAD AH: Hadii ramesh hall hadasho Soomaali, waaxda luqadaha, qaybta kaalmada adeegyada, ted patino . So [facility] [phone].    ATENCIÓN: Si andres muñoz, tiene a chatterjee disposición servicios gratuitos de asistencia lingüística. Llame al [phone].    We comply with applicable federal civil rights laws and [place] laws. We do not discriminate on the basis of race, color, national origin, age, disability, sex, sexual orientation, or gender identity.            Thank you!     Thank you for choosing [facility] DERMATOLOGY  for your care. Our goal is always to provide you with excellent care. Hearing back from our patients is one way we can continue to improve our services. Please take a few minutes to complete the written survey that you may receive in the mail after your visit with us. Thank you!             Your Updated Medication List - Protect others around you: Learn how to safely use, store and throw away your medicines at www.disposemymeds.org.          This list  is accurate as of 8/24/18  2:53 PM.  Always use your most recent med list.                   Brand Name Dispense Instructions for use Diagnosis    aspirin 325 MG tablet      one daily        blood glucose monitoring lancets     100 each    1 lancet daily.    DM (diabetes mellitus) (H)       * blood glucose monitoring test strip    no brand specified    1 Box    1 strip by In Vitro route daily.    Type II or unspecified type diabetes mellitus without mention of complication, not stated as uncontrolled       * blood glucose monitoring test strip    ACCU-CHEK SMARTVIEW    100 strip    To check glucose once daily or provide patient with strips and meter covered under her insurance.    DM (diabetes mellitus) (H)       * clobetasol 0.05 % ointment    TEMOVATE    60 g    Apply  topically 2 times daily.    Psoriasis       * clobetasol 0.05 % cream    TEMOVATE    60 g    Apply topically BID to AA on arms and legs, avoid face, neck and genital area    Psoriasis       clonazePAM 1 MG tablet    klonoPIN     Take 1 mg by mouth 2 times daily        * desonide 0.05 % ointment    DESOWEN    30 g    Apply topically 2 times daily    Psoriasis       * desonide 0.05 % ointment    DESOWEN    60 g    Apply topically 2 times daily    Plaque psoriasis       * desonide 0.05 % cream    DESOWEN    60 g    Apply topically BID to the nose    Psoriasis       lisinopril 40 MG tablet    PRINIVIL/ZESTRIL    90 tablet    Take 1 tablet (40 mg) by mouth daily    Hypertension goal BP (blood pressure) < 140/90       metoprolol tartrate 50 MG tablet    LOPRESSOR    180 tablet    Take 1 tablet (50 mg) by mouth 2 times daily    Hypertension goal BP (blood pressure) < 140/90       OMEGA-3 FISH OIL PO      Take 1 g by mouth        order for DME     1 Device    1 Device continuous prn (and wear at HS also). One medium B&C thumbkeeper    Hand pain       psyllium 0.52 g capsule      Take 1 capsule by mouth daily.        tacrolimus 0.1 % ointment    PROTOPIC     60 g    Apply topically BID to AA - alternating with topical corticosteroids    Psoriasis       triamterene-hydrochlorothiazide 37.5-25 MG per capsule    DYAZIDE    180 capsule    TAKE TWO CAPSULES BY MOUTH EVERY DAY    Hypertension goal BP (blood pressure) < 140/90, Hypopotassemia       * WELLBUTRIN 100 MG tablet   Generic drug:  buPROPion      Take by mouth daily        * buPROPion 150 MG 24 hr tablet    WELLBUTRIN XL          ZOLOFT PO      Take 200 mg by mouth daily        * Notice:  This list has 9 medication(s) that are the same as other medications prescribed for you. Read the directions carefully, and ask your doctor or other care provider to review them with you.

## 2018-08-24 NOTE — LETTER
"2018       RE: Maggy Goldberg  [address] [facility] [address]     Dear Colleague,    Thank you for referring your patient, Maggy Goldberg, to the [facility] Please see a copy of my visit note below.    [facility] Dermatology Note      Dermatology Problem List:    1. Psoriasis, nasal dorsum    2. Mucosal (labial) lentigo - lower lip - biopsy confirmed on 16  -NUB on hard palate deferred to oral pathology for biopsy       CC:   Chief Complaint   Patient presents with     Skin Check     Full body skin exam     Psoriasis         Encounter Date: Aug 24, 2018    History of Present Illness:  Ms. Maggy Goldberg is a 58 year old female who returns to the dermatology clinic for a FBSE. He was last seen on 16 with Cinthia Aldrich. At that time she had a biopsy of a brown macule on the lower lip, it turned out to be a mucosal(labial) lentigo. She also had a dark lesion on the hard palate, and she was referred to oral pathology for bx. She never went to get that checked out. She has no specific spots of concern. The patient denies painful, itching, tingling or bleeding lesions unless otherwise noted.    She also is here regarding PSO. She says it is all over. She was previously using clobetasol on the shins and desonide on the face. She has not refilled this since 213 she says and decided not to start using it again because it was . Would like refills. She says she recently has been more stressed. She changed jobs. No joint pain reported. She does have a \"bad\" right shoulder, but it has been a bad shoulder for a long time. Left shoulder is not painful. She expects her stress levels to go down soon and is hoping her PSO flare will also calm down as well. She is not interested in systemic agents at this time.     Past Medical History:   Patient Active Problem List   Diagnosis     Adjustment disorder with mixed anxiety " and depressed mood     Contact dermatitis and other eczema, due to unspecified cause     Allergic rhinitis     CARDIOVASCULAR SCREENING; LDL GOAL LESS THAN 100     CMC arthritis, thumb, degenerative     OCD (obsessive compulsive disorder)     Hypertension goal BP (blood pressure) < 140/90     Atypical nevi     Psoriasis     Hypercholesteremia     Lentigo     Plaque psoriasis     Lipoma of lower extremity, unspecified laterality     Achrochordon     Dermatofibroma     Skin cancer screening     Cervical segment dysfunction     Cervicalgia     Thoracic segment dysfunction     Right shoulder pain, unspecified chronicity     Bicipital tendinitis of right shoulder     Past Medical History:   Diagnosis Date     Adjustment disorder with mixed anxiety and depressed mood      CARDIOVASCULAR SCREENING; LDL GOAL LESS THAN 100 5/9/2010     CARDIOVASCULAR SCREENING; LDL GOAL LESS THAN 160      CMC arthritis, thumb, degenerative      Contact dermatitis and other eczema, due to unspecified cause     contact and exzema     HTN (hypertension)      Hypercholesteremia      Hypertension goal BP (blood pressure) < 140/90      Iritis 2010     OCD (obsessive compulsive disorder)      Psoriasis      Type II or unspecified type diabetes mellitus without mention of complication, not stated as uncontrolled      Uncomplicated type 2 diabetes mellitus (H) 10/14/2011     History reviewed. No pertinent surgical history.    Social History:  [place] the city. Works as a health unit coordinator. Raises chickens. The patient denies use of tanning beds.     Family History:  There is a family of unknown non-melanoma skin cancer in the patient's mother and sister. No fhx of PSO.     Medications:  Current Outpatient Prescriptions   Medication Sig Dispense Refill     ACCU-CHEK FASTCLIX LANCETS MISC 1 lancet daily. 100 each prn     ASPIRIN 325 MG OR TABS one daily       buPROPion (WELLBUTRIN XL) 150 MG 24 hr tablet        buPROPion (WELLBUTRIN)  100 MG tablet Take by mouth daily       clonazePAM (KLONOPIN) 1 MG tablet Take 1 mg by mouth 2 times daily       glucose blood VI test strips (ACCU-CHEK SMARTVIEW) strip To check glucose once daily or provide patient with strips and meter covered under her insurance. 100 strip prn     glucose blood VI test strips strip 1 strip by In Vitro route daily. 1 Box 12     lisinopril (PRINIVIL/ZESTRIL) 40 MG tablet Take 1 tablet (40 mg) by mouth daily 90 tablet 3     metoprolol tartrate (LOPRESSOR) 50 MG tablet Take 1 tablet (50 mg) by mouth 2 times daily 180 tablet 3     ORDER FOR DME 1 Device continuous prn (and wear at HS also). One medium B&C thumbkeeper 1 Device 0     psyllium 0.52 GM capsule Take 1 capsule by mouth daily.       Sertraline HCl (ZOLOFT PO) Take 200 mg by mouth daily       triamterene-hydrochlorothiazide (DYAZIDE) 37.5-25 MG per capsule TAKE TWO CAPSULES BY MOUTH EVERY  capsule 3     clobetasol (TEMOVATE) 0.05 % ointment Apply  topically 2 times daily. (Patient not taking: Reported on 8/24/2018) 60 g 0     desonide (DESOWEN) 0.05 % ointment Apply topically 2 times daily (Patient not taking: Reported on 8/24/2018) 60 g 3     desonide (DESOWEN) 0.05 % ointment Apply topically 2 times daily (Patient not taking: Reported on 8/24/2018) 30 g 0     Omega-3 Fatty Acids (OMEGA-3 FISH OIL PO) Take 1 g by mouth       Allergies   Allergen Reactions     Pertussis Vaccine      Amoxicillin Rash     Clindamycin Rash         Review of Systems:  -Constitutional: The patient denies fatigue, fevers, chills, unintended weight loss, and night sweats.  -Musculoskeletal: no joint or muscle pain or swelling aside from right shoulder pain  -Psych: no depression or anxiety, no sleep problems however stress levels have increased  -Skin: As above in HPI. No additional skin concerns.    Physical exam:  Vitals: [facility] 10/19/2010  GEN: This is a well developed, well-nourished female in no acute distress, in a pleasant mood.    SKIN:  Full skin, which includes the head/face, both arms, chest, back, abdomen,both legs, genitalia and/or groin buttocks, digits and/or nails, was examined.  -There are erythematous well demarcated plaques with silvery micaceous scale on the posterior scalp, bilateral elbows, bilateral shins and nasal dorsum. Fingernails are also affected, fingers>toenails.  -Multiple regular brown pigmented macules and papules are identified on the trunk.   -Gutierres's skin type II  -No other lesions of concern on areas examined.     Impression/Plan:  1. Psoriasis, <5% TBSA - bilateral elbows and some very small spots on the shin as well as the dorsum of the nose and posterior scalp    Start clobetasol 0.05% cream to elbows and shins    Start desonide 0.05% cream to nasal dorsum    Edu on SE of corticosteroids    Start Protopic 0.1% ointment on areas when not flaring - plan to alternate this with her other topicals once her flare up is better controlled    Edu on risk associated with PSO - joint pain, cardiovascular risks etc    Discussion about methotrexate and/or biologics - patient not interested in this time    May consider phototherapy this winter if topicals do not control her well enough    2. Skin Cancer Screening    Sunscreen: Apply 20 minutes prior to going outdoors and reapply every two hours, when wet or sweating. We recommend using an SPF 30 or higher, and to use one that is water resistant.    Continue with annual skin exams    Recommended patient follow up with oral pathology for lesion on the hard palate.    CC Dr. Barragan on close of this encounter.  Follow-up in 6 months, earlier for new or changing lesions.       Staff Involved:  Staff Only  All risks, benefits and alternatives were discussed with patient.  Patient is in agreement and understands the assessment and plan.  All questions were answered.    Munira Florence PA-C  [facility]:  Phone: [phone], Fax: [phone]

## 2018-08-24 NOTE — PROGRESS NOTES
"[facility] Dermatology Note      Dermatology Problem List:   1. Psoriasis, nasal dorsum   2. Mucosal (labial) lentigo - lower lip - biopsy confirmed on 16  -NUB on hard palate deferred to oral pathology for biopsy       CC:   Chief Complaint   Patient presents with     Skin Check     Full body skin exam     Psoriasis         Encounter Date: Aug 24, 2018    History of Present Illness:  Ms. Maggy Goldberg is a 58 year old female who returns to the dermatology clinic for a FBSE. He was last seen on 16 with Cinthia Aldrich. At that time she had a biopsy of a brown macule on the lower lip, it turned out to be a mucosal(labial) lentigo. She also had a dark lesion on the hard palate, and she was referred to oral pathology for bx. She never went to get that checked out. She has no specific spots of concern. The patient denies painful, itching, tingling or bleeding lesions unless otherwise noted.    She also is here regarding PSO. She says it is all over. She was previously using clobetasol on the shins and desonide on the face. She has not refilled this since 213 she says and decided not to start using it again because it was . Would like refills. She says she recently has been more stressed. She changed jobs. No joint pain reported. She does have a \"bad\" right shoulder, but it has been a bad shoulder for a long time. Left shoulder is not painful. She expects her stress levels to go down soon and is hoping her PSO flare will also calm down as well. She is not interested in systemic agents at this time.     Past Medical History:   Patient Active Problem List   Diagnosis     Adjustment disorder with mixed anxiety and depressed mood     Contact dermatitis and other eczema, due to unspecified cause     Allergic rhinitis     CARDIOVASCULAR SCREENING; LDL GOAL LESS THAN 100     CMC arthritis, thumb, degenerative     OCD (obsessive compulsive disorder)     Hypertension goal BP (blood pressure) < " 140/90     Atypical nevi     Psoriasis     Hypercholesteremia     Lentigo     Plaque psoriasis     Lipoma of lower extremity, unspecified laterality     Achrochordon     Dermatofibroma     Skin cancer screening     Cervical segment dysfunction     Cervicalgia     Thoracic segment dysfunction     Right shoulder pain, unspecified chronicity     Bicipital tendinitis of right shoulder     Past Medical History:   Diagnosis Date     Adjustment disorder with mixed anxiety and depressed mood      CARDIOVASCULAR SCREENING; LDL GOAL LESS THAN 100 5/9/2010     CARDIOVASCULAR SCREENING; LDL GOAL LESS THAN 160      CMC arthritis, thumb, degenerative      Contact dermatitis and other eczema, due to unspecified cause     contact and exzema     HTN (hypertension)      Hypercholesteremia      Hypertension goal BP (blood pressure) < 140/90      Iritis 2010     OCD (obsessive compulsive disorder)      Psoriasis      Type II or unspecified type diabetes mellitus without mention of complication, not stated as uncontrolled      Uncomplicated type 2 diabetes mellitus (H) 10/14/2011     History reviewed. No pertinent surgical history.    Social History:  The patient [place] in the city. Works as a health unit coordinator. Raises chickens. The patient denies use of tanning beds.     Family History:  There is a family of unknown non-melanoma skin cancer in the patient's mother and sister. No fhx of PSO.     Medications:  Current Outpatient Prescriptions   Medication Sig Dispense Refill     ACCU-CHEK FASTCLIX LANCETS MISC 1 lancet daily. 100 each prn     ASPIRIN 325 MG OR TABS one daily       buPROPion (WELLBUTRIN XL) 150 MG 24 hr tablet        buPROPion (WELLBUTRIN) 100 MG tablet Take by mouth daily       clonazePAM (KLONOPIN) 1 MG tablet Take 1 mg by mouth 2 times daily       glucose blood VI test strips (ACCU-CHEK SMARTVIEW) strip To check glucose once daily or provide patient with strips and meter covered under her insurance. 100 strip prn      glucose blood VI test strips strip 1 strip by In Vitro route daily. 1 Box 12     lisinopril (PRINIVIL/ZESTRIL) 40 MG tablet Take 1 tablet (40 mg) by mouth daily 90 tablet 3     metoprolol tartrate (LOPRESSOR) 50 MG tablet Take 1 tablet (50 mg) by mouth 2 times daily 180 tablet 3     ORDER FOR DME 1 Device continuous prn (and wear at HS also). One medium B&C thumbkeeper 1 Device 0     psyllium 0.52 GM capsule Take 1 capsule by mouth daily.       Sertraline HCl (ZOLOFT PO) Take 200 mg by mouth daily       triamterene-hydrochlorothiazide (DYAZIDE) 37.5-25 MG per capsule TAKE TWO CAPSULES BY MOUTH EVERY  capsule 3     clobetasol (TEMOVATE) 0.05 % ointment Apply  topically 2 times daily. (Patient not taking: Reported on 8/24/2018) 60 g 0     desonide (DESOWEN) 0.05 % ointment Apply topically 2 times daily (Patient not taking: Reported on 8/24/2018) 60 g 3     desonide (DESOWEN) 0.05 % ointment Apply topically 2 times daily (Patient not taking: Reported on 8/24/2018) 30 g 0     Omega-3 Fatty Acids (OMEGA-3 FISH OIL PO) Take 1 g by mouth       Allergies   Allergen Reactions     Pertussis Vaccine      Amoxicillin Rash     Clindamycin Rash         Review of Systems:  -Constitutional: The patient denies fatigue, fevers, chills, unintended weight loss, and night sweats.  -Musculoskeletal: no joint or muscle pain or swelling aside from right shoulder pain  -Psych: no depression or anxiety, no sleep problems however stress levels have increased  -Skin: As above in HPI. No additional skin concerns.    Physical exam:  Vitals: [facility] 10/19/2010  GEN: This is a well developed, well-nourished female in no acute distress, in a pleasant mood.    SKIN: Full skin, which includes the head/face, both arms, chest, back, abdomen,both legs, genitalia and/or groin buttocks, digits and/or nails, was examined.  -There are erythematous well demarcated plaques with silvery micaceous scale on the posterior scalp, bilateral elbows, bilateral  shins and nasal dorsum. Fingernails are also affected, fingers>toenails.  -Multiple regular brown pigmented macules and papules are identified on the trunk.   -Gutierres's skin type II  -No other lesions of concern on areas examined.     Impression/Plan:  1. Psoriasis, <5% TBSA - bilateral elbows and some very small spots on the shin as well as the dorsum of the nose and posterior scalp    Start clobetasol 0.05% cream to elbows and shins    Start desonide 0.05% cream to nasal dorsum    Edu on SE of corticosteroids    Start Protopic 0.1% ointment on areas when not flaring - plan to alternate this with her other topicals once her flare up is better controlled    Edu on risk associated with PSO - joint pain, cardiovascular risks etc    Discussion about methotrexate and/or biologics - patient not interested in this time    May consider phototherapy this winter if topicals do not control her well enough    2. Skin Cancer Screening    Sunscreen: Apply 20 minutes prior to going outdoors and reapply every two hours, when wet or sweating. We recommend using an SPF 30 or higher, and to use one that is water resistant.    Continue with annual skin exams    Recommended patient follow up with oral pathology for lesion on the hard palate.    CC Dr. Barragan on close of this encounter.  Follow-up in 6 months, earlier for new or changing lesions.       Staff Involved:  Staff Only  All risks, benefits and alternatives were discussed with patient.  Patient is in agreement and understands the assessment and plan.  All questions were answered.    Munira Florence PA-C  [facility]: Phone: [phone], Fax: [phone]

## 2018-08-27 ENCOUNTER — TELEPHONE (OUTPATIENT)
Dept: FAMILY MEDICINE | Facility: CLINIC | Age: 58
End: 2018-08-27

## 2018-08-27 NOTE — TELEPHONE ENCOUNTER
PA needed on tacrolimus 0.1% onit  Pt insurance is [employer]  Insurance phone number [phone]  Pt ID number [id number]  Please let us know when PA is granted/denied.

## 2018-08-29 NOTE — TELEPHONE ENCOUNTER
Prior Authorization Retail Medication Request    Medication/Dose: tacrolimus 0.1 % oint  ICD code (if different than what is on RX):  L40.9  Previously Tried and Failed:  Unknown   Rationale:      Insurance Name:  [employer]   Insurance ID:  [id number]      Pharmacy Information (if different than what is on RX)  Name:    Phone:

## 2018-08-29 NOTE — TELEPHONE ENCOUNTER
Central Prior Authorization Team   Phone: [phone]      PA Initiation    Medication: tacrolimus 0.1 % oint  Insurance Company: [employer] - Phone [phone] Fax [phone]  Pharmacy Filling the Rx: [place], [place] - [address]  Filling Pharmacy Phone: [phone]  Filling Pharmacy Fax: [phone]  Start Date: 8/29/2018

## 2018-08-30 NOTE — TELEPHONE ENCOUNTER
PRIOR AUTHORIZATION DENIED    Medication: tacrolimus 0.1 % oint-P/A DENIED    Denial Date: 8/30/2018    Denial Rational: Over the phone was told that it was denied because of the diagnosis of Psoriasis. Once received the denial rational faxed papework, I see that it does cover Eczema and although that wasn't listed on the hardcopy of this medication, it is documented in patient's chart. I will start appeal.      Appeal Information:     Will begin appeal

## 2018-08-30 NOTE — TELEPHONE ENCOUNTER
Medication Appeal Initiation    We have initiated an appeal for the requested medication:  Medication: tacrolimus 0.1 % oint-P/A DENIED/APPEAL INITIATED  Appeal Start Date:  8/30/2018  Insurance Company: [employer] - Phone [phone] Fax [phone]  Comments:  Submitted additional info documenting that patient meets the requirements of having not only Psoriasis, but eczema. And that one of the areas this medication will be used is the face. Faxed this info along with original denial letter to [employer] Prescription Claim Appeals fax# [phone].

## 2018-08-31 NOTE — TELEPHONE ENCOUNTER
MEDICATION APPEAL-- APPEAL IS APPROVED. Good from 08/31/2018-08/31/2021. Called pharmacy to notify. They will call patient once rx is ready for .      Medication: tacrolimus 0.1 % oint-P/A DENIED/APPEAL APPROVED  Authorization Effective Date:    Authorization Expiration Date:    Approved Dose/Quantity: 60 gram  Reference #: [id number] [id number]   Insurance Company: [employer] - Phone [phone] Fax [phone]  Expected CoPay:       CoPay Card Available:      Foundation Assistance Needed:    Which Pharmacy is filling the prescription (Not needed for infusion/clinic administered): [facility] [place], [place] - [address]

## 2019-03-13 ENCOUNTER — TRANSFERRED RECORDS (OUTPATIENT)
Dept: HEALTH INFORMATION MANAGEMENT | Facility: CLINIC | Age: 59
End: 2019-03-13

## 2019-03-20 ENCOUNTER — TELEPHONE (OUTPATIENT)
Dept: FAMILY MEDICINE | Facility: CLINIC | Age: 59
End: 2019-03-20

## 2019-03-20 NOTE — TELEPHONE ENCOUNTER
Reason for Call:  Other call back    Detailed comments: Patient is requesting for a call back. She is requesting for a prior authorization for a upcoming surgery for a foot/ankle injury. She would not give me any further info, said that she was too out of it right now. Writer tried to clarify with patient if what she meant was a pre-op and patient said she already had one with [facility] Please advise, thank you!    Phone Number Patient can be reached at: Home number on file [phone] (home)    Best Time: anytime    Can we leave a detailed message on this number? YES    Call taken on 3/20/2019 at 12:47 PM by Jennifer Krueger

## 2019-03-20 NOTE — TELEPHONE ENCOUNTER
Ashly is calling from [facility] prior auth department to clarify what's needed.     Ashly states that since the patient is having a out patient procedure done. PCP needs to write a referral for that in order for insurance to cover. Routing as high priority because surgery is on 3/22    Please give Ashly a call if you have questions or need referral codes. [phone].    Jennifer Freeman

## 2019-03-20 NOTE — TELEPHONE ENCOUNTER
Dr. Rodriguez/Referrals - please review     Phone call to patient     Patient states she needs primary care provider to call the number below to give Prior Authorization for her to have surgery at [facility] as it is out of her network     Writer advised patient that the surgeon is the one that needs to get surgery pre authorization approval     Per care everywhere patient scheduled for surgery 3/22/19 Trimalleolar fracture [facility]     Patient demanding writer to call the number and given an authorization - writer advised that this will be reviewed with Dr. Rodriguez and requested patient contact her surgeon regarding this request     Routing to referrals to review as well     Polly Sethi, Registered Nurse   [facility]

## 2019-03-21 ENCOUNTER — TELEPHONE (OUTPATIENT)
Dept: PODIATRY | Facility: CLINIC | Age: 59
End: 2019-03-21

## 2019-03-21 NOTE — TELEPHONE ENCOUNTER
Spoke with pt and Ashly from [facility] prior authorization department. Pt's insurance is [employer] and [facility] is out of the [facility] network and no referral will be made to [facility] since  has Orthopedic Surgeons in network. Pt's care is being directed to Celso Graff DO, at [facility], [place] [address] [phone] who specializes in foot and ankle surgery. Pt's appointment with Dr. Graff is Monday 3/25/19 and pt will cancel her scheduled surgery at [facility] for 3/22/2019.    Diana Pimentel [place] Referral Rep

## 2019-03-21 NOTE — TELEPHONE ENCOUNTER
Received voicemail from Fiona [facility] yesterday. Patient currently has [place] [facility] [place] and needs to have surgery moved into the [facility].     Phone call to Fiona who works in the referrals office. Informed that patient would need to see a provider for a consult and have surgery rescheduled. Discussed that two of our podiatrists specialize in foot and ankle. She questioned a podiatrist vs an orthopedic surgery. Informed currently in [place] we do not have an orthopedic surgeon available and this patient would be better served by a podiatrist. She had already checked with a providers in [facility] as well as the [facility]. Suggested [facility].   She states [place] is also in network now and she is checking with them.  She will call back if needed.     Phone call to Fiona and offered ffor her to contact the orthopedic  department for assistance. Phone number provided. She states patient is scheduled with Dr. Graff at [place] for a consult.     MARJ Quintana RN                Patient expecting call back: NO      Preferred contact number:  [phone]  Can we leave a detailed message on this number: YES

## 2019-03-25 ENCOUNTER — TELEPHONE (OUTPATIENT)
Dept: FAMILY MEDICINE | Facility: CLINIC | Age: 59
End: 2019-03-25

## 2019-03-25 DIAGNOSIS — E87.6 HYPOPOTASSEMIA: ICD-10-CM

## 2019-03-25 DIAGNOSIS — M25.571 PAIN IN JOINT INVOLVING ANKLE AND FOOT, RIGHT: Primary | ICD-10-CM

## 2019-03-25 DIAGNOSIS — I10 HYPERTENSION GOAL BP (BLOOD PRESSURE) < 140/90: ICD-10-CM

## 2019-03-25 NOTE — TELEPHONE ENCOUNTER
This is time sensitive.  Ortho surgeon is planning on doing surgery this Wed.  They need a referral for a right ankle CT to be done.    Pt has a referral based insurance.  Please fax the referral for a CT to them at [phone]  They really need to get the CT done today.  [facility] will handle the scheduling of the CT.      I pended a radiology referral with the CT noted.  TASHA Ghosh

## 2019-03-25 NOTE — TELEPHONE ENCOUNTER
I huddled with Dr. Rodriguez.  She said OK to sign the radiology referral for CT of ankle.    LM to inform [facility] that the referral for the order was placed.  TC will fax to them.  TASHA Ghosh

## 2019-03-25 NOTE — TELEPHONE ENCOUNTER
As per our discussion, please put the radiology referral through as she requested. Thanks! Stacia Rodriguez M.D.

## 2019-03-27 RX ORDER — TRIAMTERENE AND HYDROCHLOROTHIAZIDE 37.5; 25 MG/1; MG/1
CAPSULE ORAL
Qty: 60 CAPSULE | Refills: 0 | Status: SHIPPED | OUTPATIENT
Start: 2019-03-27 | End: 2019-05-04

## 2019-03-28 NOTE — TELEPHONE ENCOUNTER
"Requested Prescriptions   Pending Prescriptions Disp Refills     triamterene-HCTZ (DYAZIDE) 37.5-25 MG capsule [Pharmacy Med Name: TRIAMTERENE-HCTZ 37.5-25MG CAPS] 180 capsule 3     Sig: TAKE TWO CAPSULES BY MOUTH ONCE DAILY    Diuretics (Including Combos) Protocol Failed - 3/25/2019  4:17 PM       Failed - Blood pressure under 140/90 in past 12 months    BP Readings from Last 3 Encounters:   03/22/18 115/72   08/01/17 (!) 89/54   06/20/17 106/62                Failed - Recent (12 mo) or future (30 days) visit within the authorizing provider's specialty    Patient had office visit in the last 12 months or has a visit in the next 30 days with authorizing provider or within the authorizing provider's specialty.  See \"Patient Info\" tab in inbasket, or \"Choose Columns\" in Meds & Orders section of the refill encounter.             Failed - Normal serum creatinine on file in past 12 months    Recent Labs   Lab Test 03/22/18  1158   CR 0.93             Failed - Normal serum potassium on file in past 12 months    Recent Labs   Lab Test 03/22/18  1158   POTASSIUM 4.0                   Failed - Normal serum sodium on file in past 12 months    Recent Labs   Lab Test 03/22/18  1158                Passed - Medication is active on med list       Passed - Patient is age 18 or older       Passed - No active pregancy on record       Passed - No positive pregnancy test in past 12 months        Medication is being filled for 1 time refill only due to:  Patient needs to be seen because it has been more than one year since last visit.     Signed Prescriptions:                        Disp   Refills    triamterene-HCTZ (DYAZIDE) 37.5-25 MG caps*60 cap*0        Sig: TAKE TWO CAPSULES BY MOUTH ONCE DAILY  Authorizing Provider: SULEMA SOLOMON  Ordering User: LUNA MONROE      Routing to  Reception - one month refill due for annual office visit please    Thank you,  Luna Monroe RN      "

## 2019-04-19 DIAGNOSIS — I10 HYPERTENSION GOAL BP (BLOOD PRESSURE) < 140/90: ICD-10-CM

## 2019-04-19 NOTE — TELEPHONE ENCOUNTER
"Requested Prescriptions   Pending Prescriptions Disp Refills     lisinopril (PRINIVIL/ZESTRIL) 40 MG tablet [Pharmacy Med Name: LISINOPRIL 40MG TABS]  Last Written Prescription Date:  3/22/2018  Last Fill Quantity: 90 tabs,  # refills: 3   Last office visit: 3/22/2018 with prescribing provider:  House   Future Office Visit:     90 tablet 3     Sig: TAKE ONE TABLET BY MOUTH DAILY       ACE Inhibitors (Including Combos) Protocol Failed - 4/19/2019 10:10 AM        Failed - Blood pressure under 140/90 in past 12 months     BP Readings from Last 3 Encounters:   03/22/18 115/72   08/01/17 (!) 89/54   06/20/17 106/62                 Failed - Recent (12 mo) or future (30 days) visit within the authorizing provider's specialty     Patient had office visit in the last 12 months or has a visit in the next 30 days with authorizing provider or within the authorizing provider's specialty.  See \"Patient Info\" tab in inbasket, or \"Choose Columns\" in Meds & Orders section of the refill encounter.              Failed - Normal serum creatinine on file in past 12 months     Recent Labs   Lab Test 03/22/18  1158   CR 0.93             Failed - Normal serum potassium on file in past 12 months     Recent Labs   Lab Test 03/22/18  1158   POTASSIUM 4.0             Passed - Medication is active on med list        Passed - Patient is age 18 or older        Passed - No active pregnancy on record        Passed - No positive pregnancy test within past 12 months          "

## 2019-04-22 RX ORDER — LISINOPRIL 40 MG/1
TABLET ORAL
Qty: 30 TABLET | Refills: 0 | Status: SHIPPED | OUTPATIENT
Start: 2019-04-22 | End: 2019-05-21

## 2019-04-22 NOTE — TELEPHONE ENCOUNTER
One month refill as patient overdue for annual physical and labs.    Team coordinators-Please contact patient to schedule.    Thank you!  LORRAINE ShawN, RN

## 2019-05-01 DIAGNOSIS — I10 HYPERTENSION GOAL BP (BLOOD PRESSURE) < 140/90: ICD-10-CM

## 2019-05-01 DIAGNOSIS — E87.6 HYPOPOTASSEMIA: ICD-10-CM

## 2019-05-01 NOTE — TELEPHONE ENCOUNTER
"Requested Prescriptions   Pending Prescriptions Disp Refills     triamterene-HCTZ (DYAZIDE) 37.5-25 MG capsule 60 capsule 0     Sig: TAKE TWO CAPSULES BY MOUTH ONCE DAILY  Last Written Prescription Date:  3/27/2019  Last Fill Quantity: 60 capsule,  # refills: 0   Last Office Visit: 3/22/2018   Future Office Visit:            Diuretics (Including Combos) Protocol Failed - 5/1/2019  2:15 PM        Failed - Blood pressure under 140/90 in past 12 months     BP Readings from Last 3 Encounters:   03/22/18 115/72   08/01/17 (!) 89/54   06/20/17 106/62           Failed - Recent (12 mo) or future (30 days) visit within the authorizing provider's specialty     Patient had office visit in the last 12 months or has a visit in the next 30 days with authorizing provider or within the authorizing provider's specialty.  See \"Patient Info\" tab in inbasket, or \"Choose Columns\" in Meds & Orders section of the refill encounter.            Failed - Normal serum creatinine on file in past 12 months     Recent Labs   Lab Test 03/22/18  1158   CR 0.93            Failed - Normal serum potassium on file in past 12 months     Recent Labs   Lab Test 03/22/18  1158   POTASSIUM 4.0            Failed - Normal serum sodium on file in past 12 months     Recent Labs   Lab Test 03/22/18  1158               Passed - Medication is active on med list        Passed - Patient is age 18 or older        Passed - No active pregancy on record        Passed - No positive pregnancy test in past 12 months          "

## 2019-05-01 NOTE — TELEPHONE ENCOUNTER
Reason for Call:  Medication or medication refill:    Do you use a [facility]?  Name of the pharmacy and phone number for the current request:  [facility] [place] - [phone]    Name of the medication requested: triamterene-hydrochlorothiazide (DYAZIDE) 37.5-25 mg capsule    Other request: patient is due for her physical and is out of refills on this medication. Patient is home bound due to a broken ankle and is not able to come in to be seen until she not home bound. So if you can consider this as to how many refills you give her     Can we leave a detailed message on this number? YES    Phone number patient can be reached at: Home number on file [phone] (home)    Best Time:     Call taken on 5/1/2019 at 2:10 PM by Kori Carter

## 2019-05-01 NOTE — TELEPHONE ENCOUNTER
"Requested Prescriptions   Pending Prescriptions Disp Refills     metoprolol tartrate (LOPRESSOR) 50 MG tablet [Pharmacy Med Name: METOPROLOL TARTRATE 50MG TABS] 180 tablet 3     Sig: TAKE ONE TABLET BY MOUTH TWO TIMES A DAY  Last Written Prescription Date:  3/22/2018  Last Fill Quantity: 180 tablet,  # refills: 3   Last Office Visit: 3/22/2018   Future Office Visit:            Beta-Blockers Protocol Failed - 5/1/2019  2:15 PM        Failed - Blood pressure under 140/90 in past 12 months     BP Readings from Last 3 Encounters:   03/22/18 115/72   08/01/17 (!) 89/54   06/20/17 106/62           Failed - Recent (12 mo) or future (30 days) visit within the authorizing provider's specialty     Patient had office visit in the last 12 months or has a visit in the next 30 days with authorizing provider or within the authorizing provider's specialty.  See \"Patient Info\" tab in inbasket, or \"Choose Columns\" in Meds & Orders section of the refill encounter.            Passed - Patient is age 6 or older        Passed - Medication is active on med list          "

## 2019-05-02 RX ORDER — METOPROLOL TARTRATE 50 MG
TABLET ORAL
Qty: 60 TABLET | Refills: 0 | Status: SHIPPED | OUTPATIENT
Start: 2019-05-02 | End: 2019-05-21

## 2019-05-03 NOTE — TELEPHONE ENCOUNTER
Medication is being filled for 1 time refill only due to:  Patient needs to be seen because it has been more than one year since last visit.      Signed Prescriptions:                        Disp   Refills    metoprolol tartrate (LOPRESSOR) 50 MG tabl*60 tab*0        Sig: TAKE ONE TABLET BY MOUTH TWO TIMES A DAY  Authorizing Provider: SULEMA SOLOMON  Ordering User: LUNA MONROE Reception - one month refill due for annual office visit please    Thank you,  Luna Monroe RN

## 2019-05-04 NOTE — TELEPHONE ENCOUNTER
Routing refill request to provider for review/approval because:  Marianna given x1 and patient did not follow up, please advise    HW Reception - ask patient to schedule appointment and bridge can be discussed

## 2019-05-06 RX ORDER — TRIAMTERENE AND HYDROCHLOROTHIAZIDE 37.5; 25 MG/1; MG/1
CAPSULE ORAL
Qty: 30 CAPSULE | Refills: 0 | Status: SHIPPED | OUTPATIENT
Start: 2019-05-06 | End: 2019-05-21

## 2019-05-20 DIAGNOSIS — I10 HYPERTENSION GOAL BP (BLOOD PRESSURE) < 140/90: ICD-10-CM

## 2019-05-20 DIAGNOSIS — E87.6 HYPOPOTASSEMIA: ICD-10-CM

## 2019-05-20 NOTE — TELEPHONE ENCOUNTER
"Requested Prescriptions   Pending Prescriptions Disp Refills     triamterene-HCTZ (DYAZIDE) 37.5-25 MG capsule [Pharmacy Med Name: TRIAMTERENE-HCTZ 37.5-25MG CAPS] 30 capsule 0     Sig: TAKE TWO CAPSULES BY MOUTH ONCE DAILY (NEED TO BE SEEN FOR FURTHER REFILLS)  Last Written Prescription Date:  5/6/2019  Last Fill Quantity: 30 capsule,  # refills: 0   Last Office Visit: 3/22/2018   Future Office Visit:    Next 5 appointments (look out 90 days)    May 21, 2019 10:00 AM CDT  PHYSICAL with Stacia Rodriguez MD  [facility] ([facility]) [address] [place] [id number] [phone]              Diuretics (Including Combos) Protocol Failed - 5/20/2019  2:56 PM        Failed - Blood pressure under 140/90 in past 12 months     BP Readings from Last 3 Encounters:   03/22/18 115/72   08/01/17 (!) 89/54   06/20/17 106/62           Failed - Normal serum creatinine on file in past 12 months     Recent Labs   Lab Test 03/22/18  1158   CR 0.93            Failed - Normal serum potassium on file in past 12 months     Recent Labs   Lab Test 03/22/18  1158   POTASSIUM 4.0            Failed - Normal serum sodium on file in past 12 months     Recent Labs   Lab Test 03/22/18  1158               Passed - Recent (12 mo) or future (30 days) visit within the authorizing provider's specialty     Patient had office visit in the last 12 months or has a visit in the next 30 days with authorizing provider or within the authorizing provider's specialty.  See \"Patient Info\" tab in inbasket, or \"Choose Columns\" in Meds & Orders section of the refill encounter.            Passed - Medication is active on med list        Passed - Patient is age 18 or older        Passed - No active pregancy on record        Passed - No positive pregnancy test in past 12 months     _________________________________________________________________       lisinopril (PRINIVIL/ZESTRIL) 40 MG tablet [Pharmacy Med Name: " "LISINOPRIL 40MG TABS] 30 tablet 0     Sig: TAKE ONE TABLET BY MOUTH ONCE DAILY NEED OFFICE VISIT AND LABS DONE BEFORE FURTHER REFILLS  Last Written Prescription Date:  4/22/2019  Last Fill Quantity: 30 tablet,  # refills: 0   Last Office Visit: 3/22/2018   Future Office Visit:    Next 5 appointments (look out 90 days)    May 21, 2019 10:00 AM CDT  PHYSICAL with Stacia Rodriguez MD  [facility] ([facility]) [address] [place] [id number] [phone]              ACE Inhibitors (Including Combos) Protocol Failed - 5/20/2019  2:56 PM        Failed - Blood pressure under 140/90 in past 12 months     BP Readings from Last 3 Encounters:   03/22/18 115/72   08/01/17 (!) 89/54   06/20/17 106/62           Failed - Normal serum creatinine on file in past 12 months     Recent Labs   Lab Test 03/22/18  1158   CR 0.93           Failed - Normal serum potassium on file in past 12 months     Recent Labs   Lab Test 03/22/18  1158   POTASSIUM 4.0           Passed - Recent (12 mo) or future (30 days) visit within the authorizing provider's specialty     Patient had office visit in the last 12 months or has a visit in the next 30 days with authorizing provider or within the authorizing provider's specialty.  See \"Patient Info\" tab in inbasket, or \"Choose Columns\" in Meds & Orders section of the refill encounter.            Passed - Medication is active on med list        Passed - Patient is age 18 or older        Passed - No active pregnancy on record        Passed - No positive pregnancy test within past 12 months          "

## 2019-05-21 ENCOUNTER — OFFICE VISIT (OUTPATIENT)
Dept: FAMILY MEDICINE | Facility: CLINIC | Age: 59
End: 2019-05-21
Payer: COMMERCIAL

## 2019-05-21 VITALS
TEMPERATURE: 98 F | SYSTOLIC BLOOD PRESSURE: 120 MMHG | BODY MASS INDEX: 33.08 KG/M2 | RESPIRATION RATE: 16 BRPM | HEIGHT: 64 IN | OXYGEN SATURATION: 96 % | DIASTOLIC BLOOD PRESSURE: 76 MMHG | HEART RATE: 81 BPM | WEIGHT: 193.75 LBS

## 2019-05-21 DIAGNOSIS — I10 HYPERTENSION GOAL BP (BLOOD PRESSURE) < 140/90: ICD-10-CM

## 2019-05-21 DIAGNOSIS — Z12.11 COLON CANCER SCREENING: ICD-10-CM

## 2019-05-21 DIAGNOSIS — E78.00 HYPERCHOLESTEREMIA: ICD-10-CM

## 2019-05-21 DIAGNOSIS — F42.9 OBSESSIVE-COMPULSIVE DISORDER, UNSPECIFIED TYPE: ICD-10-CM

## 2019-05-21 DIAGNOSIS — Z00.00 ROUTINE GENERAL MEDICAL EXAMINATION AT A HEALTH CARE FACILITY: Primary | ICD-10-CM

## 2019-05-21 DIAGNOSIS — E87.6 HYPOPOTASSEMIA: ICD-10-CM

## 2019-05-21 DIAGNOSIS — Z12.31 ENCOUNTER FOR SCREENING MAMMOGRAM FOR BREAST CANCER: ICD-10-CM

## 2019-05-21 DIAGNOSIS — R73.9 ELEVATED BLOOD SUGAR: ICD-10-CM

## 2019-05-21 LAB
ANION GAP SERPL CALCULATED.3IONS-SCNC: 9 MMOL/L (ref 3–14)
BUN SERPL-MCNC: 17 MG/DL (ref 7–30)
CALCIUM SERPL-MCNC: 8.8 MG/DL (ref 8.5–10.1)
CHLORIDE SERPL-SCNC: 110 MMOL/L (ref 94–109)
CHOLEST SERPL-MCNC: 231 MG/DL
CO2 SERPL-SCNC: 23 MMOL/L (ref 20–32)
CREAT SERPL-MCNC: 0.86 MG/DL (ref 0.52–1.04)
CREAT UR-MCNC: 100 MG/DL
GFR SERPL CREATININE-BSD FRML MDRD: 73 ML/MIN/{1.73_M2}
GLUCOSE SERPL-MCNC: 136 MG/DL (ref 70–99)
HBA1C MFR BLD: 5.7 % (ref 0–5.6)
HDLC SERPL-MCNC: 61 MG/DL
HIV 1+2 AB+HIV1 P24 AG SERPL QL IA: NONREACTIVE
LDLC SERPL CALC-MCNC: 142 MG/DL
MICROALBUMIN UR-MCNC: 27 MG/L
MICROALBUMIN/CREAT UR: 27.3 MG/G CR (ref 0–25)
NONHDLC SERPL-MCNC: 170 MG/DL
POTASSIUM SERPL-SCNC: 4.4 MMOL/L (ref 3.4–5.3)
SODIUM SERPL-SCNC: 142 MMOL/L (ref 133–144)
TRIGL SERPL-MCNC: 138 MG/DL

## 2019-05-21 PROCEDURE — 87389 HIV-1 AG W/HIV-1&-2 AB AG IA: CPT | Performed by: FAMILY MEDICINE

## 2019-05-21 PROCEDURE — 99396 PREV VISIT EST AGE 40-64: CPT | Performed by: FAMILY MEDICINE

## 2019-05-21 PROCEDURE — 36415 COLL VENOUS BLD VENIPUNCTURE: CPT | Performed by: FAMILY MEDICINE

## 2019-05-21 PROCEDURE — 82043 UR ALBUMIN QUANTITATIVE: CPT | Performed by: FAMILY MEDICINE

## 2019-05-21 PROCEDURE — 80061 LIPID PANEL: CPT | Performed by: FAMILY MEDICINE

## 2019-05-21 PROCEDURE — 83036 HEMOGLOBIN GLYCOSYLATED A1C: CPT | Performed by: FAMILY MEDICINE

## 2019-05-21 PROCEDURE — 80048 BASIC METABOLIC PNL TOTAL CA: CPT | Performed by: FAMILY MEDICINE

## 2019-05-21 RX ORDER — TRIAMTERENE AND HYDROCHLOROTHIAZIDE 37.5; 25 MG/1; MG/1
CAPSULE ORAL
Qty: 90 CAPSULE | Refills: 3 | Status: SHIPPED | OUTPATIENT
Start: 2019-05-21 | End: 2019-11-17

## 2019-05-21 RX ORDER — METOPROLOL TARTRATE 50 MG
TABLET ORAL
Qty: 180 TABLET | Refills: 3 | Status: SHIPPED | OUTPATIENT
Start: 2019-05-21 | End: 2020-06-01

## 2019-05-21 RX ORDER — LISINOPRIL 40 MG/1
40 TABLET ORAL DAILY
Qty: 90 TABLET | Refills: 3 | Status: SHIPPED | OUTPATIENT
Start: 2019-05-21 | End: 2020-06-01

## 2019-05-21 ASSESSMENT — ENCOUNTER SYMPTOMS
ARTHRALGIAS: 1
ABDOMINAL PAIN: 0
DIZZINESS: 0
HEADACHES: 0
NERVOUS/ANXIOUS: 1
HEMATOCHEZIA: 0
BREAST MASS: 0
FEVER: 0
COUGH: 0
WEAKNESS: 0
CHILLS: 0
CONSTIPATION: 0
DIARRHEA: 0
HEARTBURN: 0
FREQUENCY: 0
EYE PAIN: 0
SHORTNESS OF BREATH: 0
JOINT SWELLING: 1
HEMATURIA: 0

## 2019-05-21 ASSESSMENT — PATIENT HEALTH QUESTIONNAIRE - PHQ9
SUM OF ALL RESPONSES TO PHQ QUESTIONS 1-9: 12
SUM OF ALL RESPONSES TO PHQ QUESTIONS 1-9: 12
10. IF YOU CHECKED OFF ANY PROBLEMS, HOW DIFFICULT HAVE THESE PROBLEMS MADE IT FOR YOU TO DO YOUR WORK, TAKE CARE OF THINGS AT HOME, OR GET ALONG WITH OTHER PEOPLE: VERY DIFFICULT

## 2019-05-21 ASSESSMENT — MIFFLIN-ST. JEOR: SCORE: 1429.87

## 2019-05-21 NOTE — PATIENT INSTRUCTIONS
Preventive Health Recommendations  Female Ages 50 - 64    Yearly exam: See your health care provider every year in order to  o Review health changes.   o Discuss preventive care.    o Review your medicines if your doctor has prescribed any.      Get a Pap test every three years (unless you have an abnormal result and your provider advises testing more often).    If you get Pap tests with HPV test, you only need to test every 5 years, unless you have an abnormal result.     You do not need a Pap test if your uterus was removed (hysterectomy) and you have not had cancer.    You should be tested each year for STDs (sexually transmitted diseases) if you're at risk.     Have a mammogram every 1 to 2 years.    Have a colonoscopy at age 50, or have a yearly FIT test (stool test). These exams screen for colon cancer.      Have a cholesterol test every 5 years, or more often if advised.    Have a diabetes test (fasting glucose) every three years. If you are at risk for diabetes, you should have this test more often.     If you are at risk for osteoporosis (brittle bone disease), think about having a bone density scan (DEXA).    Shots: Get a flu shot each year. Get a tetanus shot every 10 years.    Nutrition:     Eat at least 5 servings of fruits and vegetables each day.    Eat whole-grain bread, whole-wheat pasta and brown rice instead of white grains and rice.    Get adequate Calcium and Vitamin D.     Lifestyle    Exercise at least 150 minutes a week (30 minutes a day, 5 days a week). This will help you control your weight and prevent disease.    Limit alcohol to one drink per day.    No smoking.     Wear sunscreen to prevent skin cancer.     See your dentist every six months for an exam and cleaning.    See your eye doctor every 1 to 2 years.  I recommend getting the new shingles vaccine, Shingrix.  You can call your insurance company to find out if this vaccine is covered.  You may also ask our pharmacy to check if  your insurance covers Shingrix if given at the pharmacy.    Schedule your mammogram.

## 2019-05-21 NOTE — PROGRESS NOTES
SUBJECTIVE:   CC: Maggy Goldberg is an 59 year old woman who presents for preventive health visit.     Healthy Habits:     Getting at least 3 servings of Calcium per day:  Yes    Bi-annual eye exam:  Yes    Dental care twice a year:  NO    Sleep apnea or symptoms of sleep apnea:  None    Diet:  Regular (no restrictions)    Frequency of exercise:  2-3 days/week    Taking medications regularly:  Yes    Medication side effects:  None    PHQ-2 Total Score: 4    Additional concerns today:  No        Today's PHQ-2 Score:   PHQ-2 ( 1999 [employer]) 5/21/2019   Q1: Little interest or pleasure in doing things 2   Q2: Feeling down, depressed or hopeless 2   PHQ-2 Score 4   Q1: Little interest or pleasure in doing things More than half the days   Q2: Feeling down, depressed or hopeless More than half the days   PHQ-2 Score 4       Abuse: Current or Past(Physical, Sexual or Emotional)- No  Do you feel safe in your environment? Yes    Social History     Tobacco Use     Smoking status: Former Smoker     Types: Cigarettes     Smokeless tobacco: Never Used     Tobacco comment: 1/2 PPD   Substance Use Topics     Alcohol use: Yes     Comment: a couple per day     If you drink alcohol do you typically have >3 drinks per day or >7 drinks per week? No    Alcohol Use 5/21/2019   Prescreen: >3 drinks/day or >7 drinks/week? No   Prescreen: >3 drinks/day or >7 drinks/week? -   No flowsheet data found.    Reviewed orders with patient.  Reviewed health maintenance and updated orders accordingly - Yes  BP Readings from Last 3 Encounters:   05/21/19 120/76   03/22/18 115/72   08/01/17 (!) 89/54    Wt Readings from Last 3 Encounters:   05/21/19 87.9 kg (193 lb 12 oz)   03/22/18 79.8 kg (176 lb)   08/01/17 81.6 kg (180 lb)                  Patient Active Problem List   Diagnosis     Adjustment disorder with mixed anxiety and depressed mood     Contact dermatitis and other eczema, due to unspecified cause     Allergic rhinitis     CARDIOVASCULAR  SCREENING; LDL GOAL LESS THAN 100     CMC arthritis, thumb, degenerative     OCD (obsessive compulsive disorder)     Hypertension goal BP (blood pressure) < 140/90     Atypical nevi     Psoriasis     Hypercholesteremia     Lentigo     Plaque psoriasis     Lipoma of lower extremity, unspecified laterality     Achrochordon     Dermatofibroma     Skin cancer screening     Cervical segment dysfunction     Cervicalgia     Thoracic segment dysfunction     Right shoulder pain, unspecified chronicity     Bicipital tendinitis of right shoulder     No past surgical history on file.    Social History     Tobacco Use     Smoking status: Former Smoker     Types: Cigarettes     Smokeless tobacco: Never Used     Tobacco comment:  PPD   Substance Use Topics     Alcohol use: Yes     Comment: a couple per day     Family History   Problem Relation Age of Onset     Cancer Mother         skin cancer     Depression Mother      Osteoporosis Mother      Hypertension Father      Cancer Father          of lung cancer     Obesity Father      Diabetes Maternal Grandmother      Alcohol/Drug Paternal Grandfather         alcohol     Asthma Maternal Uncle      Blood Disease Son         kasandra nassar     Cancer Maternal Aunt         not sure what     Cancer Maternal Aunt         not sure what     Depression Brother      Thyroid Disease Paternal Uncle          Current Outpatient Medications   Medication Sig Dispense Refill     ACCU-CHEK FASTCLIX LANCETS MISC 1 lancet daily. 100 each prn     ASPIRIN 325 MG OR TABS one daily       buPROPion (WELLBUTRIN XL) 150 MG 24 hr tablet        buPROPion (WELLBUTRIN) 100 MG tablet Take by mouth daily       clobetasol (TEMOVATE) 0.05 % cream Apply topically BID to AA on arms and legs, avoid face, neck and genital area 60 g 3     clonazePAM (KLONOPIN) 1 MG tablet Take 1 mg by mouth 2 times daily       desonide (DESOWEN) 0.05 % cream Apply topically BID to the nose 60 g 3     glucose blood VI test strips  (ACCU-CHEK SMARTVIEW) strip To check glucose once daily or provide patient with strips and meter covered under her insurance. 100 strip prn     glucose blood VI test strips strip 1 strip by In Vitro route daily. 1 Box 12     lisinopril (PRINIVIL/ZESTRIL) 40 MG tablet Take 1 tablet (40 mg) by mouth daily 90 tablet 3     metoprolol tartrate (LOPRESSOR) 50 MG tablet TAKE ONE TABLET BY MOUTH TWO TIMES A  tablet 3     ORDER FOR DME 1 Device continuous prn (and wear at HS also). One medium B&C thumbkeeper 1 Device 0     Sertraline HCl (ZOLOFT PO) Take 200 mg by mouth daily       tacrolimus (PROTOPIC) 0.1 % ointment Apply topically BID to AA - alternating with topical corticosteroids 60 g 3     triamterene-HCTZ (DYAZIDE) 37.5-25 MG capsule TAKE TWO CAPSULES BY MOUTH ONCE DAILY 90 capsule 3     clobetasol (TEMOVATE) 0.05 % ointment Apply  topically 2 times daily. (Patient not taking: Reported on 8/24/2018) 60 g 0     desonide (DESOWEN) 0.05 % ointment Apply topically 2 times daily (Patient not taking: Reported on 8/24/2018) 60 g 3     desonide (DESOWEN) 0.05 % ointment Apply topically 2 times daily (Patient not taking: Reported on 8/24/2018) 30 g 0     Omega-3 Fatty Acids (OMEGA-3 FISH OIL PO) Take 1 g by mouth       psyllium 0.52 GM capsule Take 1 capsule by mouth daily.       Allergies   Allergen Reactions     Pertussis Vaccine      Amoxicillin Rash     Clindamycin Rash     Recent Labs   Lab Test 03/22/18  1158 08/01/17  1611  04/13/17  1227 08/16/16  1441  11/19/15  0953  10/07/14  1611  02/24/14  1217  10/21/11  0950   A1C 5.6  --   --   --   --   --  6.0  --  6.1*  --  6.2*   < > 6.3*   *  --   --  118*  --   --  119*  --   --    < > 127   < >  --    HDL 68  --   --  80  --   --  83  --   --    < > 59   < >  --    TRIG 168*  --   --  154*  --   --  178*  --   --    < > 228*   < >  --    ALT  --  23  --   --  28  --   --   --   --   --  39   < >  --    CR 0.93 0.88   < >  --  0.76   < > 0.81   < > 0.73  --   0.63   < >  --    GFRESTIMATED 62 66   < >  --  79   < > 73   < > 83  --  >90   < >  --    GFRESTBLACK 75 80   < >  --  >90   GFR Calc     < > 89   < > >90   GFR Calc    --  >90   < >  --    POTASSIUM 4.0 3.5   < >  --  3.5   < > 4.2   < > 3.2*  --  3.4   < >  --    TSH  --   --   --   --   --   --   --   --   --   --  1.92  --  1.23    < > = values in this interval not displayed.        Mammogram Screening: Patient over age 50, mutual decision to screen reflected in health maintenance.    Pertinent mammograms are reviewed under the imaging tab.  History of abnormal Pap smear:   Last 3 Pap and HPV Results:   PAP / HPV Latest Ref Rng & Units 5/2/2016 12/19/2012 8/19/2011   PAP - NIL NIL ASC-US(A)   HPV 16 DNA NEG Negative - -   HPV 18 DNA NEG Negative - -   OTHER HR HPV NEG Negative - -     PAP / HPV Latest Ref Rng & Units 5/2/2016 12/19/2012 8/19/2011   PAP - NIL NIL ASC-US(A)   HPV 16 DNA NEG Negative - -   HPV 18 DNA NEG Negative - -   OTHER HR HPV NEG Negative - -     Reviewed and updated as needed this visit by clinical staff  Tobacco  Allergies  Meds         Reviewed and updated as needed this visit by Provider        Past Medical History:   Diagnosis Date     Adjustment disorder with mixed anxiety and depressed mood      CARDIOVASCULAR SCREENING; LDL GOAL LESS THAN 100 5/9/2010     CARDIOVASCULAR SCREENING; LDL GOAL LESS THAN 160      CMC arthritis, thumb, degenerative      Contact dermatitis and other eczema, due to unspecified cause     contact and exzema     HTN (hypertension)      Hypercholesteremia      Hypertension goal BP (blood pressure) < 140/90      Iritis 2010     OCD (obsessive compulsive disorder)      Psoriasis      Type II or unspecified type diabetes mellitus without mention of complication, not stated as uncontrolled      Uncomplicated type 2 diabetes mellitus (H) 10/14/2011      No past surgical history on file.    Review of Systems   Constitutional:  "Negative for chills and fever.   HENT: Positive for congestion. Negative for ear pain and hearing loss.    Eyes: Negative for pain and visual disturbance.   Respiratory: Negative for cough and shortness of breath.    Cardiovascular: Negative for chest pain.   Gastrointestinal: Negative for abdominal pain, constipation, diarrhea, heartburn and hematochezia.   Breasts:  Negative for tenderness, breast mass and discharge.   Genitourinary: Negative for frequency, genital sores, hematuria, pelvic pain, vaginal bleeding and vaginal discharge.   Musculoskeletal: Positive for arthralgias and joint swelling.   Neurological: Negative for dizziness, weakness and headaches.   Psychiatric/Behavioral: The patient is nervous/anxious.      No concerns that she would like to address additionally today except to update me on her right ankle.  The fracture is healing well per her orthopedist and she is now able to weight-bear on her foot.  She will be wearing a brace for the next 6 months.  She is going to physical therapy and has an appointment after her visit today.     OBJECTIVE:   /76 (BP Location: Left arm, Patient Position: Sitting, Cuff Size: Adult Large)   Pulse 81   Temp 98  F (36.7  C) (Tympanic)   Resp 16   Ht 1.619 m (5' 3.75\")   Wt 87.9 kg (193 lb 12 oz)   LMP 10/19/2010   SpO2 96%   BMI 33.52 kg/m    Physical Exam  GENERAL: healthy, alert and no distress  EYES: Eyes grossly normal to inspection, PERRL and conjunctivae and sclerae normal  HENT: ear canals and TM's normal, nose and mouth without ulcers or lesions  NECK: no adenopathy, no asymmetry, masses, or scars and thyroid normal to palpation  RESP: lungs clear to auscultation - no rales, rhonchi or wheezes  BREAST: normal without masses, tenderness or nipple discharge and no palpable axillary masses or adenopathy  CV: regular rate and rhythm, normal S1 S2, no S3 or S4, no murmur, click or rub, no peripheral edema and peripheral pulses strong  ABDOMEN: " soft, nontender, no hepatosplenomegaly, no masses and bowel sounds normal  MS: no gross musculoskeletal defects noted, no edema  SKIN: no suspicious lesions or rashes  NEURO: Normal strength and tone, mentation intact and speech normal  PSYCH: mentation appears normal, affect normal/bright    Diagnostic Test Results:  none          ASSESSMENT/PLAN:       ICD-10-CM    1. Routine general medical examination at a health care facility Z00.00 HIV Screening   2. Hypertension goal BP (blood pressure) < 140/90 I10 BASIC METABOLIC PANEL     Albumin Random Urine Quantitative with Creat Ratio     lisinopril (PRINIVIL/ZESTRIL) 40 MG tablet     metoprolol tartrate (LOPRESSOR) 50 MG tablet     triamterene-HCTZ (DYAZIDE) 37.5-25 MG capsule   3. Hypercholesteremia E78.00 Lipid panel reflex to direct LDL Fasting   4. Colon cancer screening Z12.11 Fecal colorectal cancer screen FIT   5. Encounter for screening mammogram for breast cancer Z12.31    6. Hypopotassemia E87.6 triamterene-HCTZ (DYAZIDE) 37.5-25 MG capsule   7. Elevated blood sugar R73.9 Hemoglobin A1c   8. Obsessive-compulsive disorder, unspecified type F42.9      1. Encounter for routine adult health examination without abnormal findings  Mammogram is due, Next pap due 2021 by current guidelines with negative pap and neg high risk HPV in 2016.   FIT test due      2. Hypertension goal BP (blood pressure) < 140/90  Controlled, patient continues on metoprolol 50 mg BID, lisinopril 40mg daily, and dyazide 37.5-25mg  2 capsules daily.  - Basic metabolic panel  (Ca, Cl, CO2, Creat, Gluc, K, Na, BUN)  - Albumin Random Urine Quantitative with Creat Ratio  - metoprolol tartrate (LOPRESSOR) 50 MG tablet; Take 1 tablet (50 mg) by mouth 2 times daily  Dispense: 180 tablet; Refill: 3  - lisinopril (PRINIVIL/ZESTRIL) 40 MG tablet; Take 1 tablet (40 mg) by mouth daily  Dispense: 90 tablet; Refill: 3  - triamterene-hydrochlorothiazide (DYAZIDE) 37.5-25 MG per capsule; TAKE TWO CAPSULES  "BY MOUTH EVERY DAY  Dispense: 180 capsule; Refill: 3     3. Hypercholesteremia   If elevated risk score,  she is open to starting statin and would send to her pharmacy.  Had been on the statin in the past.  Denies any intolerance of the statin.  She is not sure when she was on it or why it was discontinued.  - Lipid panel reflex to direct LDL Fasting     4. Elevated blood sugar     - Hemoglobin A1c     5. Hypopotassemia   patient continues on dyazide 37.5-25 mg BID   - triamterene-hydrochlorothiazide (DYAZIDE) 37.5-25 MG per capsule; TAKE TWO CAPSULES BY MOUTH EVERY DAY  Dispense: 180 capsule; Refill: 3     6. OCD  Followed by psychiatry and continues on wellbutrin and sertraline. Has klonopin to use 1mg twice daily        COUNSELING:  Reviewed preventive health counseling, as reflected in patient instructions    Estimated body mass index is 33.52 kg/m  as calculated from the following:    Height as of this encounter: 1.619 m (5' 3.75\").    Weight as of this encounter: 87.9 kg (193 lb 12 oz).    Weight management plan: diet and exercise     reports that she has quit smoking. Her smoking use included cigarettes. She has never used smokeless tobacco.      Counseling Resources:  ATP IV Guidelines  Pooled Cohorts Equation Calculator  Breast Cancer Risk Calculator  FRAX Risk Assessment  ICSI Preventive Guidelines  Dietary Guidelines for Americans, 2010  USDA's MyPlate  ASA Prophylaxis  Lung CA Screening    Stacia Rodriguez MD   [facility]  Answers for HPI/ROS submitted by the patient on 5/21/2019   Annual Exam:  If you checked off any problems, how difficult have these problems made it for you to do your work, take care of things at home, or get along with other people?: Very difficult  PHQ9 TOTAL SCORE: 12    "

## 2019-05-22 RX ORDER — LISINOPRIL 40 MG/1
TABLET ORAL
Qty: 30 TABLET | Refills: 0 | OUTPATIENT
Start: 2019-05-22

## 2019-05-22 RX ORDER — TRIAMTERENE AND HYDROCHLOROTHIAZIDE 37.5; 25 MG/1; MG/1
CAPSULE ORAL
Qty: 30 CAPSULE | Refills: 0 | OUTPATIENT
Start: 2019-05-22

## 2019-05-22 ASSESSMENT — PATIENT HEALTH QUESTIONNAIRE - PHQ9: SUM OF ALL RESPONSES TO PHQ QUESTIONS 1-9: 12

## 2019-05-22 NOTE — RESULT ENCOUNTER NOTE
The results of your recent lipid (cholesterol) profile were abnormal.    Here are the results:  Lab Results       Component                Value               Date                       CHOL                     231                 05/21/2019            Lab Results       Component                Value               Date                       HDL                      61                  05/21/2019            Lab Results       Component                Value               Date                       LDL                      142                 05/21/2019            Lab Results       Component                Value               Date                       TRIG                     138                 05/21/2019            Lab Results       Component                Value               Date                       CHOLHDLRATIO             3.3                 05/15/2014              Desired or goal levels are:  CHOLESTEROL: Desirable is less than 200.   HDL (Good Cholesterol): Desirable is greater than 40 (for men) greater than 50 (for women).  LDL (Bad Cholesterol): Desirable is less than 130 (or less than 100 if you have heart disease or diabetes). Borderline 130-160.  TRIGLYCERIDES: Desirable is less than 150.  Borderline is 150-200.    To help lower your LDL (bad cholesterol) you could start adding ground flax seed to your diet. The recommended dose is 2 heaping tablespoonfuls daily, you may want to start with 1 tablespoonful and increase to 2 heaping tablespoonfuls. You can mix or add ground flax seed to hot cereals, yogurt, applesauce, cottage cheese or any sauce mixture that is your portion. Ground flax seed can be found in the baking aisle near the flour.      The 10-year ASCVD risk score (Silvinogary KOHLER Jr., et al., 2013) is: 4.1%    Values used to calculate the score:      Age: 59 years      Sex: Female      Is Non- : No      Diabetic: No      Tobacco smoker: No      Systolic Blood Pressure: 120 mmHg       Is BP treated: Yes      HDL Cholesterol: 61 mg/dL      Total Cholesterol: 231 mg/dL     As you may know, an elevated cholesterol is one factor that increases your risk for heart disease and stroke. You can improve your cholesterol by controlling the amount and type of fat you eat and by increasing your daily activity level.    Here are some ways to improve your nutrition:  Eat less fat (especially butter, Crisco and other saturated fats)  Buy lean cuts of meat, reduce your portions of red meat or substitute poultry or fish  Use skim milk and low-fat dairy products  Eat no more than 4 egg yolks per week  Avoid fried or fast foods that are high in fat  Eat more fruits and vegetables      Also consider starting or increasing your aerobic activity. Aerobic activity is the best way to improve HDL (good) cholesterol. If this would be new to you, please talk with me first about what activities are safe for you.      Other lab results:    Your HIV test was normal.     Your urine protein test was mildly abnormal. We will continue to monitor this yearly.     The testing of your kidney function  and electrolytes was stable.     Your blood sugar was a little high (as also noted with the A1C). We will continue to monitor this yearly and more often as needed.     Please feel free to contact us with any questions or if you would like more information.      Stacia Rodriguez M.D.

## 2019-07-02 ENCOUNTER — NURSE TRIAGE (OUTPATIENT)
Dept: FAMILY MEDICINE | Facility: CLINIC | Age: 59
End: 2019-07-02

## 2019-07-02 ENCOUNTER — OFFICE VISIT (OUTPATIENT)
Dept: URGENT CARE | Facility: URGENT CARE | Age: 59
End: 2019-07-02
Payer: COMMERCIAL

## 2019-07-02 VITALS
SYSTOLIC BLOOD PRESSURE: 116 MMHG | DIASTOLIC BLOOD PRESSURE: 83 MMHG | HEART RATE: 74 BPM | OXYGEN SATURATION: 99 % | TEMPERATURE: 98.4 F | WEIGHT: 190 LBS | BODY MASS INDEX: 32.87 KG/M2

## 2019-07-02 DIAGNOSIS — N30.00 ACUTE CYSTITIS WITHOUT HEMATURIA: Primary | ICD-10-CM

## 2019-07-02 DIAGNOSIS — R30.0 DYSURIA: ICD-10-CM

## 2019-07-02 LAB

## 2019-07-02 PROCEDURE — 99213 OFFICE O/P EST LOW 20 MIN: CPT | Performed by: INTERNAL MEDICINE

## 2019-07-02 PROCEDURE — 87086 URINE CULTURE/COLONY COUNT: CPT | Performed by: INTERNAL MEDICINE

## 2019-07-02 PROCEDURE — 81001 URINALYSIS AUTO W/SCOPE: CPT | Performed by: INTERNAL MEDICINE

## 2019-07-02 RX ORDER — SULFAMETHOXAZOLE/TRIMETHOPRIM 800-160 MG
1 TABLET ORAL 2 TIMES DAILY
Qty: 6 TABLET | Refills: 0 | Status: SHIPPED | OUTPATIENT
Start: 2019-07-02 | End: 2019-07-05

## 2019-07-02 NOTE — TELEPHONE ENCOUNTER
Reason for call:  Patient reporting a symptom    Symptom or request: UTI    Additional comments: Patient states she is 99.9% sure she has a UTI and would like a call back to speak with a nurse. Please assist. Thanks!    Phone Number patient can be reached at:  Home number on file [phone] (home)    Best Time:  Any    Can we leave a detailed message on this number:  YES    Call taken on 7/2/2019 at 12:29 PM by Jannie Fowler

## 2019-07-02 NOTE — TELEPHONE ENCOUNTER
Patient states she has pain with urination, blood in her urine and increased frequency.  Advised patient she should be seen in clinic to leave a urine samople.  Patient is currently at work and will plan to go to Urgent Care at the [facility] clinic this evening.  Understands need to make sure what we are treating and that the correct antibiotics are ordered.  Lori Bolaños RN

## 2019-07-03 LAB
BACTERIA SPEC CULT: NORMAL
SPECIMEN SOURCE: NORMAL

## 2019-07-03 NOTE — PROGRESS NOTES
SUBJECTIVE:   Maggy Goldberg is a 59 year old female presenting with a chief complaint of No chief complaint on file.  concerned has UTI    She is an established patient of [facility].    UTI    Onset of symptoms was 1week(s).  Course of illness is worsening  Current and associated symptoms frequency, urgency, burning and blood in urine  Treatment and measures tried Cranberry juice  Predisposing factors include none  Patient denies flank pain, temperature > 101 degrees F., vomiting, vaginal discharge and vaginal odor            Review of Systems    Past Medical History:   Diagnosis Date     Adjustment disorder with mixed anxiety and depressed mood      CARDIOVASCULAR SCREENING; LDL GOAL LESS THAN 100 2010     CARDIOVASCULAR SCREENING; LDL GOAL LESS THAN 160      CMC arthritis, thumb, degenerative      Contact dermatitis and other eczema, due to unspecified cause     contact and exzema     HTN (hypertension)      Hypercholesteremia      Hypertension goal BP (blood pressure) < 140/90      Divya      OCD (obsessive compulsive disorder)      Psoriasis      Type II or unspecified type diabetes mellitus without mention of complication, not stated as uncontrolled      Uncomplicated type 2 diabetes mellitus (H) 10/14/2011     Family History   Problem Relation Age of Onset     Cancer Mother         skin cancer     Depression Mother      Osteoporosis Mother      Hypertension Father      Cancer Father          of lung cancer     Obesity Father      Diabetes Maternal Grandmother      Alcohol/Drug Paternal Grandfather         alcohol     Asthma Maternal Uncle      Blood Disease Son         kasandra nassar     Cancer Maternal Aunt         not sure what     Cancer Maternal Aunt         not sure what     Depression Brother      Thyroid Disease Paternal Uncle      Current Outpatient Medications   Medication Sig Dispense Refill     ACCU-CHEK FASTCLIX LANCETS MISC 1 lancet daily. 100 each prn     ASPIRIN 325 MG OR TABS one  daily       buPROPion (WELLBUTRIN XL) 150 MG 24 hr tablet        buPROPion (WELLBUTRIN) 100 MG tablet Take by mouth daily       clobetasol (TEMOVATE) 0.05 % cream Apply topically BID to AA on arms and legs, avoid face, neck and genital area 60 g 3     clonazePAM (KLONOPIN) 1 MG tablet Take 1 mg by mouth 2 times daily       desonide (DESOWEN) 0.05 % cream Apply topically BID to the nose 60 g 3     glucose blood VI test strips (ACCU-CHEK SMARTVIEW) strip To check glucose once daily or provide patient with strips and meter covered under her insurance. 100 strip prn     glucose blood VI test strips strip 1 strip by In Vitro route daily. 1 Box 12     lisinopril (PRINIVIL/ZESTRIL) 40 MG tablet Take 1 tablet (40 mg) by mouth daily 90 tablet 3     metoprolol tartrate (LOPRESSOR) 50 MG tablet TAKE ONE TABLET BY MOUTH TWO TIMES A  tablet 3     ORDER FOR DME 1 Device continuous prn (and wear at HS also). One medium B&C thumbkeeper 1 Device 0     Sertraline HCl (ZOLOFT PO) Take 200 mg by mouth daily       sulfamethoxazole-trimethoprim (BACTRIM DS/SEPTRA DS) 800-160 MG tablet Take 1 tablet by mouth 2 times daily for 3 days 6 tablet 0     tacrolimus (PROTOPIC) 0.1 % ointment Apply topically BID to AA - alternating with topical corticosteroids 60 g 3     triamterene-HCTZ (DYAZIDE) 37.5-25 MG capsule TAKE TWO CAPSULES BY MOUTH ONCE DAILY 90 capsule 3     Social History     Tobacco Use     Smoking status: Former Smoker     Types: Cigarettes     Smokeless tobacco: Never Used     Tobacco comment: 1/2 PPD   Substance Use Topics     Alcohol use: Yes     Comment: a couple per day       OBJECTIVE  /83   Pulse 74   Temp 98.4  F (36.9  C) (Tympanic)   Wt 86.2 kg (190 lb)   LMP 10/19/2010   SpO2 99%   BMI 32.87 kg/m      Physical Exam   Constitutional: She appears well-developed and well-nourished.   Abdominal: Soft. There is no tenderness.   Musculoskeletal:   No CVA tenderness   Vitals reviewed.      Labs:  Results for  orders placed or performed in visit on 07/02/19 (from the past 24 hour(s))   *UA reflex to Microscopic and Culture ([facility] (except [place] and [place])   Result Value Ref Range    Color Urine Yellow     Appearance Urine Clear     Glucose Urine Negative NEG^Negative mg/dL    Bilirubin Urine Negative NEG^Negative    Ketones Urine Negative NEG^Negative mg/dL    Specific Gravity Urine 1.015 1.003 - 1.035    Blood Urine Small (A) NEG^Negative    pH Urine 6.5 5.0 - 7.0 pH    Protein Albumin Urine Negative NEG^Negative mg/dL    Urobilinogen Urine 0.2 0.2 - 1.0 EU/dL    Nitrite Urine Negative NEG^Negative    Leukocyte Esterase Urine Small (A) NEG^Negative    Source Midstream Urine    Urine Microscopic   Result Value Ref Range    WBC Urine 5-10 (A) OTO5^0 - 5 /HPF    RBC Urine 2-5 (A) OTO2^O - 2 /HPF    Squamous Epithelial /LPF Urine Few FEW^Few /LPF    Bacteria Urine Few (A) NEG^Negative /HPF           ASSESSMENT:      ICD-10-CM    1. Acute cystitis without hematuria N30.00 sulfamethoxazole-trimethoprim (BACTRIM DS/SEPTRA DS) 800-160 MG tablet     Urine Culture Aerobic Bacterial   2. Dysuria R30.0 *UA reflex to Microscopic and Culture ([facility] (except [place] and [place])     Urine Microscopic      PLAN:  Bactrim 3 days  urine culture     Followup:    If not improving or if condition worsens, follow up with your Primary Care Provider

## 2019-07-03 NOTE — PATIENT INSTRUCTIONS
"      Patient Education     Bladder Infection, Female (Adult)    Urine is normally doesn't have any bacteria in it. But bacteria can get into the urinary tract from the skin around the rectum. Or they can travel in the blood from elsewhere in the body. Once they are in your urinary tract, they can cause infection in the urethra (urethritis), the bladder (cystitis), or the kidneys (pyelonephritis).  The most common place for an infection is in the bladder. This is called a bladder infection. This is one of the most common infections in women. Most bladder infections are easily treated. They are not serious unless the infection spreads to the kidney.  The phrases \"bladder infection,\" \"UTI,\" and \"cystitis\" are often used to describe the same thing. But they are not always the same. Cystitis is an inflammation of the bladder. The most common cause of cystitis is an infection.  Symptoms  The infection causes inflammation in the urethra and bladder. This causes many of the symptoms. The most common symptoms of a bladder infection are:    Pain or burning when urinating    Having to urinate more often than usual    Urgent need to urinate    Only a small amount of urine comes out    Blood in urine    Abdominal discomfort. This is usually in the lower abdomen above the pubic bone.    Cloudy urine    Strong- or bad-smelling urine    Unable to urinate (urinary retention)    Unable to hold urine in (urinary incontinence)    Fever    Loss of appetite    Confusion (in older adults)  Causes  Bladder infections are not contagious. You can't get one from someone else, from a toilet seat, or from sharing a bath.  The most common cause of bladder infections is bacteria from the bowels. The bacteria get onto the skin around the opening of the urethra. From there, they can get into the urine and travel up to the bladder, causing inflammation and infection. This usually happens because of:    Wiping improperly after urinating. Always wipe " from front to back.    Bowel incontinence    Pregnancy    Procedures such as having a catheter inserted    Older age    Not emptying your bladder. This can allow bacteria a chance to grow in your urine.    Dehydration    Constipation    Sex    Use of a diaphragm for birth control   Treatment  Bladder infections are diagnosed by a urine test. They are treated with antibiotics and usually clear up quickly without complications. Treatment helps prevent a more serious kidney infection.  Medicines  Medicines can help in the treatment of a bladder infection:    Take antibiotics until they are used up, even if you feel better. It is important to finish them to make sure the infection has cleared.    You can use acetaminophen or ibuprofen for pain, fever, or discomfort, unless another medicine was prescribed. If you have chronic liver or kidney disease, talk with your healthcare provider before using these medicines. Also talk with your provider if you've ever had a stomach ulcer or gastrointestinal bleeding, or are taking blood-thinner medicines.    If you are given phenazopydridine to reduce burning with urination, it will cause your urine to become a bright orange color. This can stain clothing.  Care and prevention  These self-care steps can help prevent future infections:    Drink plenty of fluids to prevent dehydration and flush out your bladder. Do this unless you must restrict fluids for other health reasons, or your doctor told you not to.    Proper cleaning after going to the bathroom is important. Wipe from front to back after using the toilet to prevent the spread of bacteria.    Urinate more often. Don't try to hold urine in for a long time.    Wear loose-fitting clothes and cotton underwear. Avoid tight-fitting pants.    Improve your diet and prevent constipation. Eat more fresh fruit and vegetables, and fiber, and less junk and fatty foods.    Avoid sex until your symptoms are gone.    Avoid caffeine,  alcohol, and spicy foods. These can irritate your bladder.    Urinate right after intercourse to flush out your bladder.    If you use birth control pills and have frequent bladder infections, discuss it with your doctor.  Follow-up care  Call your healthcare provider if all symptoms are not gone after 3 days of treatment. This is especially important if you have repeat infections.  If a culture was done, you will be told if your treatment needs to be changed. If directed, you can call to find out the results.  If X-rays were done, you will be told if the results will affect your treatment.  Call 911  Call 911 if any of the following occur:    Trouble breathing    Hard to wake up or confusion    Fainting or loss of consciousness    Rapid heart rate  When to seek medical advice  Call your healthcare provider right away if any of these occur:    Fever of 100.4 F (38.0 C) or higher, or as directed by your healthcare provider    Symptoms are not better by the third day of treatment    Back or belly (abdominal) pain that gets worse    Repeated vomiting, or unable to keep medicine down    Weakness or dizziness    Vaginal discharge    Pain, redness, or swelling in the outer vaginal area (labia)  Date Last Reviewed: 10/1/2016    9853-0335 [employer]. [address] [place] [address], [place] [address]. All rights reserved. This information is not intended as a substitute for professional medical care. Always follow your healthcare professional's instructions.

## 2019-07-12 ENCOUNTER — OFFICE VISIT (OUTPATIENT)
Dept: URGENT CARE | Facility: URGENT CARE | Age: 59
End: 2019-07-12
Payer: COMMERCIAL

## 2019-07-12 VITALS
TEMPERATURE: 98.3 F | SYSTOLIC BLOOD PRESSURE: 104 MMHG | BODY MASS INDEX: 32.52 KG/M2 | OXYGEN SATURATION: 97 % | WEIGHT: 188 LBS | DIASTOLIC BLOOD PRESSURE: 64 MMHG | HEART RATE: 95 BPM

## 2019-07-12 DIAGNOSIS — M70.21 OLECRANON BURSITIS OF RIGHT ELBOW: Primary | ICD-10-CM

## 2019-07-12 PROCEDURE — 99213 OFFICE O/P EST LOW 20 MIN: CPT | Performed by: INTERNAL MEDICINE

## 2019-07-12 ASSESSMENT — ENCOUNTER SYMPTOMS: FEVER: 0

## 2019-07-12 NOTE — PROGRESS NOTES
SUBJECTIVE:   Maggy Goldberg is a 59 year old female presenting with a chief complaint of   Chief Complaint   Patient presents with     Urgent Care     Pt in clinic to have eval for inflamed right elbow for 1+ weeks.     Elbow right       She is an established patient of [facility].    Has psoriasis on bilateral elbows  Unit coordinator, does place elbow on desk    Onset of symptoms was 10 day(s) ago.  Location: right elbow fluid  Getting bigger  No  injury happened Current and Associated symptoms: Swelling  Denies  Pain, Bruising, Warmth, Redness, Decreased range of motion and Stiffness  Aggravating Factors: none  Therapies to improve symptoms include: none  This is the first time this type of problem has occurred for this patient.       Review of Systems   Constitutional: Negative for fever.       Past Medical History:   Diagnosis Date     Adjustment disorder with mixed anxiety and depressed mood      CARDIOVASCULAR SCREENING; LDL GOAL LESS THAN 100 2010     CARDIOVASCULAR SCREENING; LDL GOAL LESS THAN 160      CMC arthritis, thumb, degenerative      Contact dermatitis and other eczema, due to unspecified cause     contact and exzema     HTN (hypertension)      Hypercholesteremia      Hypertension goal BP (blood pressure) < 140/90      Iritis      OCD (obsessive compulsive disorder)      Psoriasis      Type II or unspecified type diabetes mellitus without mention of complication, not stated as uncontrolled      Uncomplicated type 2 diabetes mellitus (H) 10/14/2011     Family History   Problem Relation Age of Onset     Cancer Mother         skin cancer     Depression Mother      Osteoporosis Mother      Hypertension Father      Cancer Father          of lung cancer     Obesity Father      Diabetes Maternal Grandmother      Alcohol/Drug Paternal Grandfather         alcohol     Asthma Maternal Uncle      Blood Disease Son         von maday     Cancer Maternal Aunt         not sure what     Cancer  Maternal Aunt         not sure what     Depression Brother      Thyroid Disease Paternal Uncle      Current Outpatient Medications   Medication Sig Dispense Refill     ACCU-CHEK FASTCLIX LANCETS MISC 1 lancet daily. 100 each prn     ASPIRIN 325 MG OR TABS one daily       buPROPion (WELLBUTRIN XL) 150 MG 24 hr tablet        buPROPion (WELLBUTRIN) 100 MG tablet Take by mouth daily       clobetasol (TEMOVATE) 0.05 % cream Apply topically BID to AA on arms and legs, avoid face, neck and genital area 60 g 3     clonazePAM (KLONOPIN) 1 MG tablet Take 1 mg by mouth 2 times daily       desonide (DESOWEN) 0.05 % cream Apply topically BID to the nose 60 g 3     glucose blood VI test strips (ACCU-CHEK SMARTVIEW) strip To check glucose once daily or provide patient with strips and meter covered under her insurance. 100 strip prn     glucose blood VI test strips strip 1 strip by In Vitro route daily. 1 Box 12     lisinopril (PRINIVIL/ZESTRIL) 40 MG tablet Take 1 tablet (40 mg) by mouth daily 90 tablet 3     metoprolol tartrate (LOPRESSOR) 50 MG tablet TAKE ONE TABLET BY MOUTH TWO TIMES A  tablet 3     ORDER FOR DME 1 Device continuous prn (and wear at HS also). One medium B&C thumbkeeper 1 Device 0     Sertraline HCl (ZOLOFT PO) Take 200 mg by mouth daily       tacrolimus (PROTOPIC) 0.1 % ointment Apply topically BID to AA - alternating with topical corticosteroids 60 g 3     triamterene-HCTZ (DYAZIDE) 37.5-25 MG capsule TAKE TWO CAPSULES BY MOUTH ONCE DAILY 90 capsule 3     Social History     Tobacco Use     Smoking status: Former Smoker     Types: Cigarettes     Smokeless tobacco: Never Used     Tobacco comment: 1/2 PPD   Substance Use Topics     Alcohol use: Yes     Comment: a couple per day       OBJECTIVE  /64   Pulse 95   Temp 98.3  F (36.8  C) (Oral)   Wt 85.3 kg (188 lb)   LMP 10/19/2010   SpO2 97%   BMI 32.52 kg/m      Physical Exam   Constitutional: She appears well-developed and well-nourished.    Musculoskeletal:   right elbow    4 cm size of bursa under plaque of psoriasis  nontender   No warmth    Not infected   Vitals reviewed.      Labs:  No results found for this or any previous visit (from the past 24 hour(s)).        ASSESSMENT:      ICD-10-CM    1. Olecranon bursitis of right elbow M70.21         Medical Decision Making:  Concern would be infection  Not infected, risk from infection would come psoriasis    Patient Instructions   Ice, compression  Reviewed cares outlined below  If persistent, see Orthopedics   No placement of elbows on tables.  Watch for infection      Patient Education     Bursitis of the Elbow (Olecranon)  Your elbow joint contains a small fluid-filled sac called a bursa. The bursa helps the muscles and tendons move smoothly over the bone. It also cushions and protects your elbow. Bursitis is when the bursa is inflamed or swollen. This is most often due to overuse of or injury to the elbow. Symptoms include swelling and pain. If the elbow is red and feels warm to the touch, the bursa itself may be infected.  In most cases, elbow bursitis resolves with medicine and self-care at home. It may take several weeks for the bursa to heal and the swelling to go away. In some cases, your healthcare provider may drain excess fluid from the bursa. Or, he or she may inject medicine directly into the bursa to help relieve symptoms. In severe cases, you may need surgery to remove the bursa may. If there is concern that the bursa is infected, your healthcare provider may prescribe antibiotics to treat the infection.    Home care  Your healthcare provider may prescribe medicine to help relieve pain and swelling. This may be an over-the-counter pain reliever or prescription pain medicine. Take all medicines as directed. To help treat or prevent infection, your provider may prescribe antibiotics. If these are prescribed, take them as directed until they are gone.  The following are general care  guidelines:    Apply an ice pack or bag of frozen peas wrapped in a thin towel to your elbow for 15 to 20 minutes at a time. Do this 3 to 4 times a day until pain and swelling improve.    Keep your elbow raised above the level of your heart whenever possible. This helps reduce swelling. When sitting or lying down, place your arm on a pillow that rests on your chest or on a pillow at your side.    Use an elastic wrap around the elbow joint to compress the area while it is healing. Make the wrap snug but not tight to the point of causing pain.    Rest your elbow to give it time to heal. You may need to wear an elbow pad to help protect and limit the movement of your elbow. During and after healing, avoid leaning on your elbows.  Follow-up care  Follow up with your healthcare provider, or as advised. If you have been referred to a specialist, make that appointment promptly.  When to seek medical advice  Call your healthcare provider right away if any of these occur:    Fever of 100.4 F (38 C) or higher, or as advised    Chills    Increased pain, swelling, warmth, redness, or drainage from the joint    Trouble moving the elbow joint    Numbness or tingling in the hand    Severe pain or swelling in forearm or hand    Loss of pink color and slow return of color after squeezing fingertip or hand  Date Last Reviewed: 6/1/2016 2000-2018 [employer]. [address] [place] [address], [place] [address]. All rights reserved. This information is not intended as a substitute for professional medical care. Always follow your healthcare professional's instructions.

## 2019-09-29 ENCOUNTER — HEALTH MAINTENANCE LETTER (OUTPATIENT)
Age: 59
End: 2019-09-29

## 2019-10-15 ENCOUNTER — OFFICE VISIT (OUTPATIENT)
Dept: DERMATOLOGY | Facility: CLINIC | Age: 59
End: 2019-10-15
Payer: COMMERCIAL

## 2019-10-15 ENCOUNTER — TELEPHONE (OUTPATIENT)
Dept: DERMATOLOGY | Facility: CLINIC | Age: 59
End: 2019-10-15

## 2019-10-15 DIAGNOSIS — Z51.81 MEDICATION MONITORING ENCOUNTER: ICD-10-CM

## 2019-10-15 DIAGNOSIS — L40.9 PSORIASIS: ICD-10-CM

## 2019-10-15 DIAGNOSIS — L40.9 PSORIASIS: Primary | ICD-10-CM

## 2019-10-15 LAB
ALBUMIN SERPL-MCNC: 3.7 G/DL (ref 3.4–5)
ALP SERPL-CCNC: 76 U/L (ref 40–150)
ALT SERPL W P-5'-P-CCNC: 31 U/L (ref 0–50)
ANION GAP SERPL CALCULATED.3IONS-SCNC: 9 MMOL/L (ref 3–14)
AST SERPL W P-5'-P-CCNC: 27 U/L (ref 0–45)
BASOPHILS # BLD AUTO: 0.1 10E9/L (ref 0–0.2)
BASOPHILS NFR BLD AUTO: 0.8 %
BILIRUB DIRECT SERPL-MCNC: 0.2 MG/DL (ref 0–0.2)
BILIRUB SERPL-MCNC: 0.5 MG/DL (ref 0.2–1.3)
BUN SERPL-MCNC: 14 MG/DL (ref 7–30)
CALCIUM SERPL-MCNC: 9.2 MG/DL (ref 8.5–10.1)
CHLORIDE SERPL-SCNC: 105 MMOL/L (ref 94–109)
CO2 SERPL-SCNC: 26 MMOL/L (ref 20–32)
CREAT SERPL-MCNC: 0.78 MG/DL (ref 0.52–1.04)
DIFFERENTIAL METHOD BLD: NORMAL
EOSINOPHIL # BLD AUTO: 0.1 10E9/L (ref 0–0.7)
EOSINOPHIL NFR BLD AUTO: 2 %
ERYTHROCYTE [DISTWIDTH] IN BLOOD BY AUTOMATED COUNT: 13.2 % (ref 10–15)
GFR SERPL CREATININE-BSD FRML MDRD: 82 ML/MIN/{1.73_M2}
GLUCOSE SERPL-MCNC: 87 MG/DL (ref 70–99)
HCT VFR BLD AUTO: 39.8 % (ref 35–47)
HGB BLD-MCNC: 13 G/DL (ref 11.7–15.7)
IMM GRANULOCYTES # BLD: 0 10E9/L (ref 0–0.4)
IMM GRANULOCYTES NFR BLD: 0.3 %
LYMPHOCYTES # BLD AUTO: 1.1 10E9/L (ref 0.8–5.3)
LYMPHOCYTES NFR BLD AUTO: 17.1 %
MCH RBC QN AUTO: 31.9 PG (ref 26.5–33)
MCHC RBC AUTO-ENTMCNC: 32.7 G/DL (ref 31.5–36.5)
MCV RBC AUTO: 98 FL (ref 78–100)
MONOCYTES # BLD AUTO: 0.6 10E9/L (ref 0–1.3)
MONOCYTES NFR BLD AUTO: 8.6 %
NEUTROPHILS # BLD AUTO: 4.6 10E9/L (ref 1.6–8.3)
NEUTROPHILS NFR BLD AUTO: 71.2 %
NRBC # BLD AUTO: 0 10*3/UL
NRBC BLD AUTO-RTO: 0 /100
PLATELET # BLD AUTO: 193 10E9/L (ref 150–450)
POTASSIUM SERPL-SCNC: 4.1 MMOL/L (ref 3.4–5.3)
PROT SERPL-MCNC: 7.4 G/DL (ref 6.8–8.8)
RBC # BLD AUTO: 4.07 10E12/L (ref 3.8–5.2)
SODIUM SERPL-SCNC: 140 MMOL/L (ref 133–144)
WBC # BLD AUTO: 6.5 10E9/L (ref 4–11)

## 2019-10-15 RX ORDER — CLOBETASOL PROPIONATE 0.5 MG/G
CREAM TOPICAL
Qty: 60 G | Refills: 3 | Status: SHIPPED | OUTPATIENT
Start: 2019-10-15 | End: 2022-05-26

## 2019-10-15 RX ORDER — DESONIDE 0.5 MG/G
CREAM TOPICAL
Qty: 60 G | Refills: 3 | Status: SHIPPED | OUTPATIENT
Start: 2019-10-15 | End: 2021-08-04

## 2019-10-15 RX ORDER — TACROLIMUS 1 MG/G
OINTMENT TOPICAL
Qty: 60 G | Refills: 3 | Status: SHIPPED | OUTPATIENT
Start: 2019-10-15 | End: 2022-07-01

## 2019-10-15 ASSESSMENT — PAIN SCALES - GENERAL: PAINLEVEL: NO PAIN (0)

## 2019-10-15 NOTE — LETTER
"10/15/2019       RE: Maggy Goldberg  [address] [facility] [address]     Dear Colleague,    Thank you for referring your patient, Maggy Goldberg, to the [facility] DERMATOLOGY at [facility]. Please see a copy of my visit note below.    [facility] Dermatology Note      Dermatology Problem List:   1. Psoriasis  -Plan to start Humira once authorized by insurance, prescribed on 10/15/19  -Current tx: desonide 0.05%, clobetasol 0.05%, protopic 0.1%   2. Mucosal (labial) lentigo - lower lip - biopsy confirmed on 6/1/16  -NUB on hard palate deferred to oral pathology for biopsy    Encounter Date: Oct 15, 2019    CC:  Chief Complaint   Patient presents with     Psoriasis     Maggy is here today for a psoriasis follow up.          History of Present Illness:  Ms. Maggy Goldberg is a 59 year old female who is a return patient to the clinic and presents for a FBSE. The patient was last seen on 08/24/2018 when clobetasol 0.05% cream to elbows and shins, desoniide 0.05% cream to nasal dorsum, protopic 0.1% ointment were started for psoriasis. At today's visit, the patient notes her psoriasis has not improved and states she is doing \"poorly.\" She states she has been using the topical medications that were prescribed at the last visit with no improvement. She noticed a flare up of psoriasis in March after breaking her ankle. The patient states light therapy is not feasible due to her current work hours. She is now interested in systemic medications. She says she andres snot have DM. Her A1C was elevated once, and she has had it checked ever since and it has not continued to be a problem. She has no known hx of TB or hepatitis. She has missed several days of work due to PSO and the associated follow-up. Denies joint pain. She has not been ill recently. No recent hospitalizations. The patient denies painful, itching, tingling or bleeding lesions unless otherwise " noted.      Past Medical History:   Patient Active Problem List   Diagnosis     Adjustment disorder with mixed anxiety and depressed mood     Contact dermatitis and other eczema, due to unspecified cause     Allergic rhinitis     CARDIOVASCULAR SCREENING; LDL GOAL LESS THAN 100     CMC arthritis, thumb, degenerative     OCD (obsessive compulsive disorder)     Hypertension goal BP (blood pressure) < 140/90     Atypical nevi     Psoriasis     Hypercholesteremia     Lentigo     Plaque psoriasis     Lipoma of lower extremity, unspecified laterality     Achrochordon     Dermatofibroma     Skin cancer screening     Cervical segment dysfunction     Cervicalgia     Thoracic segment dysfunction     Right shoulder pain, unspecified chronicity     Bicipital tendinitis of right shoulder     Past Medical History:   Diagnosis Date     Adjustment disorder with mixed anxiety and depressed mood      CARDIOVASCULAR SCREENING; LDL GOAL LESS THAN 100 2010     CARDIOVASCULAR SCREENING; LDL GOAL LESS THAN 160      CMC arthritis, thumb, degenerative      Contact dermatitis and other eczema, due to unspecified cause     contact and exzema     HTN (hypertension)      Hypercholesteremia      Hypertension goal BP (blood pressure) < 140/90      Iritis      OCD (obsessive compulsive disorder)      Psoriasis      Type II or unspecified type diabetes mellitus without mention of complication, not stated as uncontrolled      Uncomplicated type 2 diabetes mellitus (H) 10/14/2011     No past surgical history on file.    Social History:   reports that she has quit smoking. Her smoking use included cigarettes. She has never used smokeless tobacco. She reports current alcohol use. She reports that she does not use drugs.    Family History:  Family History   Problem Relation Age of Onset     Cancer Mother         skin cancer     Depression Mother      Osteoporosis Mother      Hypertension Father      Cancer Father          of lung cancer      Obesity Father      Diabetes Maternal Grandmother      Alcohol/Drug Paternal Grandfather         alcohol     Asthma Maternal Uncle      Blood Disease Son         kasandra nassar     Cancer Maternal Aunt         not sure what     Cancer Maternal Aunt         not sure what     Depression Brother      Thyroid Disease Paternal Uncle        Medications:  Current Outpatient Medications   Medication Sig Dispense Refill     ACCU-CHEK FASTCLIX LANCETS MISC 1 lancet daily. 100 each prn     ASPIRIN 325 MG OR TABS one daily       buPROPion (WELLBUTRIN XL) 150 MG 24 hr tablet        buPROPion (WELLBUTRIN) 100 MG tablet Take by mouth daily       clobetasol (TEMOVATE) 0.05 % cream Apply topically BID to AA on arms and legs, avoid face, neck and genital area 60 g 3     clonazePAM (KLONOPIN) 1 MG tablet Take 1 mg by mouth 2 times daily       desonide (DESOWEN) 0.05 % cream Apply topically BID to the nose 60 g 3     glucose blood VI test strips (ACCU-CHEK SMARTVIEW) strip To check glucose once daily or provide patient with strips and meter covered under her insurance. 100 strip prn     glucose blood VI test strips strip 1 strip by In Vitro route daily. 1 Box 12     lisinopril (PRINIVIL/ZESTRIL) 40 MG tablet Take 1 tablet (40 mg) by mouth daily 90 tablet 3     metoprolol tartrate (LOPRESSOR) 50 MG tablet TAKE ONE TABLET BY MOUTH TWO TIMES A  tablet 3     ORDER FOR DME 1 Device continuous prn (and wear at HS also). One medium B&C thumbkeeper 1 Device 0     Sertraline HCl (ZOLOFT PO) Take 200 mg by mouth daily       tacrolimus (PROTOPIC) 0.1 % ointment Apply topically BID to AA - alternating with topical corticosteroids 60 g 3     triamterene-HCTZ (DYAZIDE) 37.5-25 MG capsule TAKE TWO CAPSULES BY MOUTH ONCE DAILY 90 capsule 3       Allergies   Allergen Reactions     Pertussis Vaccine      Amoxicillin Rash     Clindamycin Rash       Review of Systems:  -Constitutional: The patient denies fatigue, fevers, chills, unintended weight  loss, and night sweats.  -HEENT: Patient denies nonhealing oral sores.  -Skin: As above in HPI. No additional skin concerns.  -GI: no nausea, abdominal pain, vomiting, diarrhea or blood in the stool    Physical exam:  Vitals: [facility] 10/19/2010   GEN: This is a well developed, well-nourished female in no acute distress, in a pleasant mood.    SKIN: Total skin excluding the undergarment areas was performed. The exam included the head/face, neck, both arms, chest, back, abdomen, both legs, digits and/or nails.   -4x4cm scaly plaques on the bialteral elbows  -Scattered, larger plaques on the anterior shins  -plaques within bilateral ear canals  -Nail changes with onycholysis of nearly all fingernails   -Mild, thin occipital scalp patches   -Persistent plaque on the nose  -No other lesions of concern on areas examined.       Impression/Plan:  1. Psoriasis, ~5% BSA    Discussed treatment options including light therapy, Humira/biologics, MTX and its associated side effects    Patient is now interested in biologic systemic medication due to the fact it is not possible for her to make it into clinic 3x weekly for phototherapy and also because topicals are no longer controlling her skin disease    Start Humira 40mg/0.8ml, initial loading dose of 80mg on day 1, then inject 40mg/0.8mL subcutaneously on day 8 and then 40mg/0.8mL every two weeks thereafter    Edu that patient may not see benefit from the medication for 3-6mo. Edu on potential side effects of immunosuppression as well as injection site reactions, increased risk of infections, heart failure, and neutropenia.     Continue clobetasol 0.05% cream to elbows and shins, prescription refilled today    Continue desonide 0.05% cream to nasal dorsum, prescription refilled today    Continue Protopic 0.1% ointment on areas when not flaring, prescription refilled today    The following labs were obtained: CBC, CMP, Quantiferon TB gold, hepatic panel, HIV, Hepatitis B  antibody    CC Dr. Barragan on close of this encounter.  Follow-up in 3 months, earlier for new or changing lesions.       Staff Involved:  Staff/Scribe    Scribe Disclosure:  I, Kirt Bustillo, am serving as a scribe to document services personally performed by Munira Florence PA-C, based on data collection and the provider's statements to me.     Provider Disclosure:   The documentation recorded by the scribe accurately reflects the services I personally performed and the decisions made by me.    All risks, benefits and alternatives were discussed with patient.  Patient is in agreement and understands the assessment and plan.  All questions were answered.    Munira Florence PA-C  [facility]: Phone: [phone], Fax: [phone]

## 2019-10-15 NOTE — NURSING NOTE
Dermatology Rooming Note    Maggy Goldberg's goals for this visit include:   Chief Complaint   Patient presents with     Psoriasis     Maggy is here today for a psoriasis follow up.      DWIGHT Milligan

## 2019-10-15 NOTE — TELEPHONE ENCOUNTER
PA Initiation    Medication: Humira CF 80mg/ 0.8mL & 40mg/ 0.4mL pens (Pso Start kit & maintenance)  Insurance Company: [employer] - Phone [phone] Fax [phone]  Pharmacy Filling the Rx: [facility] - [address] [place] [address]  Filling Pharmacy Phone: [phone]  Filling Pharmacy Fax:    Start Date: 10/15/2019    ** Don't see notes of pt trialing anything systemic (only topicals); pending labs; pharmacy system down so unable to run test claim to ascertain mandatory specialty pharmacy

## 2019-10-16 LAB
HBV SURFACE AB SERPL IA-ACNC: 2.25 M[IU]/ML
HIV 1+2 AB+HIV1 P24 AG SERPL QL IA: NONREACTIVE

## 2019-10-17 LAB
GAMMA INTERFERON BACKGROUND BLD IA-ACNC: 0.04 IU/ML
M TB IFN-G BLD-IMP: NEGATIVE
M TB IFN-G CD4+ BCKGRND COR BLD-ACNC: >10 IU/ML
MITOGEN IGNF BCKGRD COR BLD-ACNC: 0 IU/ML
MITOGEN IGNF BCKGRD COR BLD-ACNC: 0 IU/ML

## 2019-10-21 NOTE — TELEPHONE ENCOUNTER
Prior Authorization Approval    Authorization Effective Date: 10/18/2019  Authorization Expiration Date: 10/18/2021  Medication: Humira CF 80mg/ 0.8mL & 40mg/ 0.4mL pens   Approved Dose/Quantity: Pso Start kit & maintenance  Reference #: CMM key# [id number]   Insurance Company: [employer] - Phone [phone] Fax [phone]  Expected CoPay: $5 with copay card ($70 without)     CoPay Card Available: Yes    Foundation Assistance Needed:    Which Pharmacy is filling the prescription (Not needed for infusion/clinic administered): [address]  Pharmacy Notified: Yes  Patient Notified: Yes

## 2019-11-17 DIAGNOSIS — E87.6 HYPOPOTASSEMIA: ICD-10-CM

## 2019-11-17 DIAGNOSIS — I10 HYPERTENSION GOAL BP (BLOOD PRESSURE) < 140/90: ICD-10-CM

## 2019-11-18 RX ORDER — TRIAMTERENE AND HYDROCHLOROTHIAZIDE 37.5; 25 MG/1; MG/1
CAPSULE ORAL
Qty: 180 CAPSULE | Refills: 1 | Status: SHIPPED | OUTPATIENT
Start: 2019-11-18 | End: 2020-06-01

## 2019-11-18 NOTE — TELEPHONE ENCOUNTER
"Prescription approved per [facility] Refill Protocol.  Colette Robb RN      Last Office Visit: 5/21/2019   Future Office Visit:  None    Requested Prescriptions   Pending Prescriptions Disp Refills     triamterene-HCTZ (DYAZIDE) 37.5-25 MG capsule [Pharmacy Med Name: TRIAMTERENE-HCTZ 37.5-25MG CAPS] 180 capsule 1     Sig: TAKE TWO CAPSULES BY MOUTH ONCE DAILY       Diuretics (Including Combos) Protocol Passed - 11/17/2019  6:34 PM        Passed - Blood pressure under 140/90 in past 12 months     BP Readings from Last 3 Encounters:   07/12/19 104/64   07/02/19 116/83   05/21/19 120/76                 Passed - Recent (12 mo) or future (30 days) visit within the authorizing provider's specialty     Patient has had an office visit with the authorizing provider or a provider within the authorizing providers department within the previous 12 mos or has a future within next 30 days. See \"Patient Info\" tab in inbasket, or \"Choose Columns\" in Meds & Orders section of the refill encounter.              Passed - Medication is active on med list        Passed - Patient is age 18 or older        Passed - No active pregancy on record        Passed - Normal serum creatinine on file in past 12 months     Recent Labs   Lab Test 10/15/19  1449   CR 0.78              Passed - Normal serum potassium on file in past 12 months     Recent Labs   Lab Test 10/15/19  1449   POTASSIUM 4.1                    Passed - Normal serum sodium on file in past 12 months     Recent Labs   Lab Test 10/15/19  1449                 Passed - No positive pregnancy test in past 12 months            "

## 2020-01-06 ENCOUNTER — DOCUMENTATION ONLY (OUTPATIENT)
Dept: CARE COORDINATION | Facility: CLINIC | Age: 60
End: 2020-01-06

## 2020-01-16 ENCOUNTER — TELEPHONE (OUTPATIENT)
Dept: DERMATOLOGY | Facility: CLINIC | Age: 60
End: 2020-01-16

## 2020-01-16 NOTE — TELEPHONE ENCOUNTER
LVM letting patient know we need to reschedule her appointment on 1/17/20 with Munira Florence. Provider out sick.    Yue Rouse A

## 2020-01-27 ENCOUNTER — OFFICE VISIT (OUTPATIENT)
Dept: DERMATOLOGY | Facility: CLINIC | Age: 60
End: 2020-01-27
Payer: COMMERCIAL

## 2020-01-27 DIAGNOSIS — L40.9 PSORIASIS: Primary | ICD-10-CM

## 2020-01-27 DIAGNOSIS — Z51.81 MEDICATION MONITORING ENCOUNTER: ICD-10-CM

## 2020-01-27 ASSESSMENT — PAIN SCALES - GENERAL: PAINLEVEL: NO PAIN (0)

## 2020-01-27 NOTE — NURSING NOTE
Dermatology Rooming Note    Maggy Goldberg's goals for this visit include:   Chief Complaint   Patient presents with     Psoriasis     Maggy is here today for a psorisasis follow up.      DWIGHT Milligan

## 2020-01-27 NOTE — LETTER
1/27/2020       RE: Maggy Goldberg  [address] [facility] [address]     Dear Colleague,    Thank you for referring your patient, Maggy Goldberg, to the [facility]ERMATOLOGY at [facility]. Please see a copy of my visit note below.    [facility] Dermatology Note      Dermatology Problem List:   1. Psoriasis  -Current tx: Humira, desonide 0.05%, clobetasol 0.05%, protopic 0.1%   2. Mucosal (labial) lentigo - lower lip - biopsy confirmed on 6/1/16  -NUB on hard palate deferred to oral pathology for biopsy    Encounter Date: Jan 27, 2020    CC:  Chief Complaint   Patient presents with     Psoriasis     Maggy is here today for a psorisasis follow up.          History of Present Illness:  Ms. Maggy Goldberg is a 60 year old female who presents as a follow-up for psoriasis. The patient was last seen on 10/15/2019 when Humira 40 mg every two weeks was started and clobetasol 0.05% cream to elbows and shins, desonide 0.05% cream to nasal dorsum and protopic 0.1% ointment on areas when not flaring was continued for psoriasis. At today's visit, the patient notes she is doing very well on Humira and that all of her plaques have thinned. She notes that she is experiencing itchiness throughout the body, but has not been moisturizing regularly. The patient denies experiencing joint pain. She denies additional lesions or areas of concern. She has not been sick recently and has not been hospitalized. No breathing difficulties. The patient denies painful, itching, tingling or bleeding lesions unless otherwise noted.    Past Medical History:   Patient Active Problem List   Diagnosis     Adjustment disorder with mixed anxiety and depressed mood     Contact dermatitis and other eczema, due to unspecified cause     Allergic rhinitis     CARDIOVASCULAR SCREENING; LDL GOAL LESS THAN 100     CMC arthritis, thumb, degenerative     OCD (obsessive compulsive disorder)      Hypertension goal BP (blood pressure) < 140/90     Atypical nevi     Psoriasis     Hypercholesteremia     Lentigo     Plaque psoriasis     Lipoma of lower extremity, unspecified laterality     Achrochordon     Dermatofibroma     Skin cancer screening     Cervical segment dysfunction     Cervicalgia     Thoracic segment dysfunction     Right shoulder pain, unspecified chronicity     Bicipital tendinitis of right shoulder     Past Medical History:   Diagnosis Date     Adjustment disorder with mixed anxiety and depressed mood      CARDIOVASCULAR SCREENING; LDL GOAL LESS THAN 100 2010     CARDIOVASCULAR SCREENING; LDL GOAL LESS THAN 160      CMC arthritis, thumb, degenerative      Contact dermatitis and other eczema, due to unspecified cause     contact and exzema     HTN (hypertension)      Hypercholesteremia      Hypertension goal BP (blood pressure) < 140/90      Iritis      OCD (obsessive compulsive disorder)      Psoriasis      Type II or unspecified type diabetes mellitus without mention of complication, not stated as uncontrolled      Uncomplicated type 2 diabetes mellitus (H) 10/14/2011     No past surgical history on file.    Social History:   reports that she has quit smoking. Her smoking use included cigarettes. She has never used smokeless tobacco. She reports current alcohol use. She reports that she does not use drugs.    Family History:  Family History   Problem Relation Age of Onset     Cancer Mother         skin cancer     Depression Mother      Osteoporosis Mother      Hypertension Father      Cancer Father          of lung cancer     Obesity Father      Diabetes Maternal Grandmother      Alcohol/Drug Paternal Grandfather         alcohol     Asthma Maternal Uncle      Blood Disease Son         kasandra nassar     Cancer Maternal Aunt         not sure what     Cancer Maternal Aunt         not sure what     Depression Brother      Thyroid Disease Paternal Uncle        Medications:  Current  Outpatient Medications   Medication Sig Dispense Refill     ACCU-CHEK FASTCLIX LANCETS MISC 1 lancet daily. 100 each prn     adalimumab (HUMIRA *CF*) 40 MG/0.4ML pen kit Inject 0.4 mLs (40 mg) Subcutaneous every 14 days 8 each 0     adalimumab (HUMIRA *CF*) 80 MG/0.8ML & 40MG/0.4ML pen kit Inject 80 mg Subcutaneous once On day 1 and then inject 40 mg every other week thereafter beginning on Day 8.       ASPIRIN 325 MG OR TABS one daily       buPROPion (WELLBUTRIN XL) 150 MG 24 hr tablet        buPROPion (WELLBUTRIN) 100 MG tablet Take by mouth daily       clobetasol (TEMOVATE) 0.05 % external cream Apply topically BID to AA on arms and legs, avoid face, neck and genital area 60 g 3     clonazePAM (KLONOPIN) 1 MG tablet Take 1 mg by mouth 2 times daily       desonide (DESOWEN) 0.05 % external cream Apply topically BID to the nose 60 g 3     lisinopril (PRINIVIL/ZESTRIL) 40 MG tablet Take 1 tablet (40 mg) by mouth daily 90 tablet 3     metoprolol tartrate (LOPRESSOR) 50 MG tablet TAKE ONE TABLET BY MOUTH TWO TIMES A  tablet 3     ORDER FOR DME 1 Device continuous prn (and wear at HS also). One medium B&C thumbkeeper 1 Device 0     Sertraline HCl (ZOLOFT PO) Take 200 mg by mouth daily       tacrolimus (PROTOPIC) 0.1 % external ointment Apply topically BID to AA - alternating with topical corticosteroids 60 g 3     triamterene-HCTZ (DYAZIDE) 37.5-25 MG capsule TAKE TWO CAPSULES BY MOUTH ONCE DAILY 180 capsule 1       Allergies   Allergen Reactions     Pertussis Vaccine      Amoxicillin Rash     Clindamycin Rash       Review of Systems:  -Constitutional: The patient denies fatigue, fevers, chills, unintended weight loss, and night sweats.  -HEENT: Patient denies nonhealing oral sores.  -Skin: As above in HPI. No additional skin concerns.  -GI: no nausea, abdominal pain, vomiting, diarrhea or blood in the stool    Physical exam:  Vitals: [facility] 10/19/2010   GEN: This is a well developed, well-nourished male in no  acute distress, in a pleasant mood.    SKIN: Focused examination of the face, neck, bilateral lower arms, bilateral lower legs was performed.  -Plaques on the shins, elbows and nose appear thinner - still present but overall improved. She is happy with progress she has made.  -No other lesions of concern on areas examined.       Impression/Plan:  1. Psoriasis, ~5% BSA    Continue Humira 40mg/0.8ml every two weeks    Restart clobetasol 0.05% cream to elbows and shins, prescription refilled today    Restart desonide 0.05% cream to nasal dorsum, prescription refilled today    Restart Protopic 0.1% ointment on areas when not flaring, prescription refilled today    Recommended patient start moisturizing regularly    The following labs were obtained: CBC, CMP - next TB Quant test will be in October 2020    CC Dr. Barragan on close of this encounter.  Follow-up in 3 months, earlier for new or changing lesions.       Staff Involved:  Staff/Scribe    Scribe Disclosure:  I, Kirt Bustillo, am serving as a scribe to document services personally performed by Munira Florence PA-C, based on data collection and the provider's statements to me.     Provider Disclosure:   The documentation recorded by the scribe accurately reflects the services I personally performed and the decisions made by me.    All risks, benefits and alternatives were discussed with patient.  Patient is in agreement and understands the assessment and plan.  All questions were answered.    Munira Florence PA-C, MPAS  [facility] [place]: Phone: [phone], Fax: [phone]  [facility]: Phone: [phone],  Fax: [phone]

## 2020-01-27 NOTE — PROGRESS NOTES
[facility] Dermatology Note      Dermatology Problem List:   1. Psoriasis  -Current tx: Humira, desonide 0.05%, clobetasol 0.05%, protopic 0.1%   2. Mucosal (labial) lentigo - lower lip - biopsy confirmed on 6/1/16  -NUB on hard palate deferred to oral pathology for biopsy    Encounter Date: Jan 27, 2020    CC:  Chief Complaint   Patient presents with     Psoriasis     Maggy is here today for a psorisasis follow up.          History of Present Illness:  Ms. Maggy Goldberg is a 60 year old female who presents as a follow-up for psoriasis. The patient was last seen on 10/15/2019 when Humira 40 mg every two weeks was started and clobetasol 0.05% cream to elbows and shins, desonide 0.05% cream to nasal dorsum and protopic 0.1% ointment on areas when not flaring was continued for psoriasis. At today's visit, the patient notes she is doing very well on Humira and that all of her plaques have thinned. She notes that she is experiencing itchiness throughout the body, but has not been moisturizing regularly. The patient denies experiencing joint pain. She denies additional lesions or areas of concern. She has not been sick recently and has not been hospitalized. No breathing difficulties. The patient denies painful, itching, tingling or bleeding lesions unless otherwise noted.    Past Medical History:   Patient Active Problem List   Diagnosis     Adjustment disorder with mixed anxiety and depressed mood     Contact dermatitis and other eczema, due to unspecified cause     Allergic rhinitis     CARDIOVASCULAR SCREENING; LDL GOAL LESS THAN 100     CMC arthritis, thumb, degenerative     OCD (obsessive compulsive disorder)     Hypertension goal BP (blood pressure) < 140/90     Atypical nevi     Psoriasis     Hypercholesteremia     Lentigo     Plaque psoriasis     Lipoma of lower extremity, unspecified laterality     Achrochordon     Dermatofibroma     Skin cancer screening     Cervical segment dysfunction      Cervicalgia     Thoracic segment dysfunction     Right shoulder pain, unspecified chronicity     Bicipital tendinitis of right shoulder     Past Medical History:   Diagnosis Date     Adjustment disorder with mixed anxiety and depressed mood      CARDIOVASCULAR SCREENING; LDL GOAL LESS THAN 100 2010     CARDIOVASCULAR SCREENING; LDL GOAL LESS THAN 160      CMC arthritis, thumb, degenerative      Contact dermatitis and other eczema, due to unspecified cause     contact and exzema     HTN (hypertension)      Hypercholesteremia      Hypertension goal BP (blood pressure) < 140/90      Iritis      OCD (obsessive compulsive disorder)      Psoriasis      Type II or unspecified type diabetes mellitus without mention of complication, not stated as uncontrolled      Uncomplicated type 2 diabetes mellitus (H) 10/14/2011     No past surgical history on file.    Social History:   reports that she has quit smoking. Her smoking use included cigarettes. She has never used smokeless tobacco. She reports current alcohol use. She reports that she does not use drugs.    Family History:  Family History   Problem Relation Age of Onset     Cancer Mother         skin cancer     Depression Mother      Osteoporosis Mother      Hypertension Father      Cancer Father          of lung cancer     Obesity Father      Diabetes Maternal Grandmother      Alcohol/Drug Paternal Grandfather         alcohol     Asthma Maternal Uncle      Blood Disease Son         kasandra nassar     Cancer Maternal Aunt         not sure what     Cancer Maternal Aunt         not sure what     Depression Brother      Thyroid Disease Paternal Uncle        Medications:  Current Outpatient Medications   Medication Sig Dispense Refill     ACCU-CHEK FASTCLIX LANCETS MISC 1 lancet daily. 100 each prn     adalimumab (HUMIRA *CF*) 40 MG/0.4ML pen kit Inject 0.4 mLs (40 mg) Subcutaneous every 14 days 8 each 0     adalimumab (HUMIRA *CF*) 80 MG/0.8ML & 40MG/0.4ML pen kit  Inject 80 mg Subcutaneous once On day 1 and then inject 40 mg every other week thereafter beginning on Day 8.       ASPIRIN 325 MG OR TABS one daily       buPROPion (WELLBUTRIN XL) 150 MG 24 hr tablet        buPROPion (WELLBUTRIN) 100 MG tablet Take by mouth daily       clobetasol (TEMOVATE) 0.05 % external cream Apply topically BID to AA on arms and legs, avoid face, neck and genital area 60 g 3     clonazePAM (KLONOPIN) 1 MG tablet Take 1 mg by mouth 2 times daily       desonide (DESOWEN) 0.05 % external cream Apply topically BID to the nose 60 g 3     lisinopril (PRINIVIL/ZESTRIL) 40 MG tablet Take 1 tablet (40 mg) by mouth daily 90 tablet 3     metoprolol tartrate (LOPRESSOR) 50 MG tablet TAKE ONE TABLET BY MOUTH TWO TIMES A  tablet 3     ORDER FOR DME 1 Device continuous prn (and wear at HS also). One medium B&C thumbkeeper 1 Device 0     Sertraline HCl (ZOLOFT PO) Take 200 mg by mouth daily       tacrolimus (PROTOPIC) 0.1 % external ointment Apply topically BID to AA - alternating with topical corticosteroids 60 g 3     triamterene-HCTZ (DYAZIDE) 37.5-25 MG capsule TAKE TWO CAPSULES BY MOUTH ONCE DAILY 180 capsule 1       Allergies   Allergen Reactions     Pertussis Vaccine      Amoxicillin Rash     Clindamycin Rash       Review of Systems:  -Constitutional: The patient denies fatigue, fevers, chills, unintended weight loss, and night sweats.  -HEENT: Patient denies nonhealing oral sores.  -Skin: As above in HPI. No additional skin concerns.  -GI: no nausea, abdominal pain, vomiting, diarrhea or blood in the stool    Physical exam:  Vitals: [facility] 10/19/2010   GEN: This is a well developed, well-nourished male in no acute distress, in a pleasant mood.    SKIN: Focused examination of the face, neck, bilateral lower arms, bilateral lower legs was performed.  -Plaques on the shins, elbows and nose appear thinner - still present but overall improved. She is happy with progress she has made.  -No other lesions  of concern on areas examined.       Impression/Plan:  1. Psoriasis, ~5% BSA    Continue Humira 40mg/0.8ml every two weeks    Restart clobetasol 0.05% cream to elbows and shins, prescription refilled today    Restart desonide 0.05% cream to nasal dorsum, prescription refilled today    Restart Protopic 0.1% ointment on areas when not flaring, prescription refilled today    Recommended patient start moisturizing regularly    The following labs were obtained: CBC, CMP - next TB Quant test will be in October 2020    CC Dr. Barragan on close of this encounter.  Follow-up in 3 months, earlier for new or changing lesions.       Staff Involved:  Staff/Scribe    Scribe Disclosure:  I, Kirt Bustillo, am serving as a scribe to document services personally performed by Munira Florence PA-C, based on data collection and the provider's statements to me.     Provider Disclosure:   The documentation recorded by the scribe accurately reflects the services I personally performed and the decisions made by me.    All risks, benefits and alternatives were discussed with patient.  Patient is in agreement and understands the assessment and plan.  All questions were answered.    Munira Florence PA-C, MPAS  [facility] [place]: Phone: [phone], Fax: [phone]  [facility]: Phone: [phone],  Fax: [phone]

## 2020-02-05 DIAGNOSIS — Z51.81 MEDICATION MONITORING ENCOUNTER: ICD-10-CM

## 2020-02-05 LAB
BASOPHILS # BLD AUTO: 0 10E9/L (ref 0–0.2)
BASOPHILS NFR BLD AUTO: 0.4 %
DIFFERENTIAL METHOD BLD: ABNORMAL
EOSINOPHIL # BLD AUTO: 0.1 10E9/L (ref 0–0.7)
EOSINOPHIL NFR BLD AUTO: 2.9 %
ERYTHROCYTE [DISTWIDTH] IN BLOOD BY AUTOMATED COUNT: 12.9 % (ref 10–15)
HCT VFR BLD AUTO: 44 % (ref 35–47)
HGB BLD-MCNC: 15.1 G/DL (ref 11.7–15.7)
LYMPHOCYTES # BLD AUTO: 1.1 10E9/L (ref 0.8–5.3)
LYMPHOCYTES NFR BLD AUTO: 24.1 %
MCH RBC QN AUTO: 35 PG (ref 26.5–33)
MCHC RBC AUTO-ENTMCNC: 34.3 G/DL (ref 31.5–36.5)
MCV RBC AUTO: 102 FL (ref 78–100)
MONOCYTES # BLD AUTO: 0.4 10E9/L (ref 0–1.3)
MONOCYTES NFR BLD AUTO: 9.8 %
NEUTROPHILS # BLD AUTO: 2.8 10E9/L (ref 1.6–8.3)
NEUTROPHILS NFR BLD AUTO: 62.8 %
PLATELET # BLD AUTO: 166 10E9/L (ref 150–450)
RBC # BLD AUTO: 4.31 10E12/L (ref 3.8–5.2)
WBC # BLD AUTO: 4.5 10E9/L (ref 4–11)

## 2020-02-05 PROCEDURE — 80053 COMPREHEN METABOLIC PANEL: CPT | Performed by: PHYSICIAN ASSISTANT

## 2020-02-05 PROCEDURE — 36415 COLL VENOUS BLD VENIPUNCTURE: CPT | Performed by: PHYSICIAN ASSISTANT

## 2020-02-05 PROCEDURE — 85025 COMPLETE CBC W/AUTO DIFF WBC: CPT | Performed by: PHYSICIAN ASSISTANT

## 2020-02-06 LAB
ALBUMIN SERPL-MCNC: 4.1 G/DL (ref 3.4–5)
ALP SERPL-CCNC: 65 U/L (ref 40–150)
ALT SERPL W P-5'-P-CCNC: 56 U/L (ref 0–50)
ANION GAP SERPL CALCULATED.3IONS-SCNC: 5 MMOL/L (ref 3–14)
AST SERPL W P-5'-P-CCNC: 58 U/L (ref 0–45)
BILIRUB SERPL-MCNC: 0.6 MG/DL (ref 0.2–1.3)
BUN SERPL-MCNC: 15 MG/DL (ref 7–30)
CALCIUM SERPL-MCNC: 9.1 MG/DL (ref 8.5–10.1)
CHLORIDE SERPL-SCNC: 104 MMOL/L (ref 94–109)
CO2 SERPL-SCNC: 27 MMOL/L (ref 20–32)
CREAT SERPL-MCNC: 0.96 MG/DL (ref 0.52–1.04)
GFR SERPL CREATININE-BSD FRML MDRD: 64 ML/MIN/{1.73_M2}
GLUCOSE SERPL-MCNC: 125 MG/DL (ref 70–99)
POTASSIUM SERPL-SCNC: 3.9 MMOL/L (ref 3.4–5.3)
PROT SERPL-MCNC: 7.5 G/DL (ref 6.8–8.8)
SODIUM SERPL-SCNC: 136 MMOL/L (ref 133–144)

## 2020-02-11 ENCOUNTER — TELEPHONE (OUTPATIENT)
Dept: DERMATOLOGY | Facility: CLINIC | Age: 60
End: 2020-02-11

## 2020-02-11 NOTE — TELEPHONE ENCOUNTER
[facility] Call Center    Phone Message    May a detailed message be left on voicemail: no     Reason for Call: Medication Refill Request    Has the patient contacted the pharmacy for the refill? Yes   Name of medication being requested: adalimumab (HUMIRA *CF*) 40 MG/0.4ML pen kit  Provider who prescribed the medication: GIGI Shelby  Pharmacy: Please send to  # [phone], new pharmacy, Pt couldn't provide me the name of the pharmacy  Date medication is needed: ASAP, Pt is OUT      Action Taken: Message routed to:  [facility] REFILL TEAM    Travel Screening: Not Applicable

## 2020-02-11 NOTE — TELEPHONE ENCOUNTER
Order sent 1-.  Message left for the patient to call and see why it has not been delivered.      Kathleen M Doege RN

## 2020-03-09 ENCOUNTER — TRANSFERRED RECORDS (OUTPATIENT)
Dept: HEALTH INFORMATION MANAGEMENT | Facility: CLINIC | Age: 60
End: 2020-03-09

## 2020-05-14 DIAGNOSIS — L40.9 PSORIASIS: ICD-10-CM

## 2020-05-15 NOTE — TELEPHONE ENCOUNTER
HUMIRA *CF* PEN 40 MG/0.4ML pen kit       Last Written Prescription Date:  1-  Last Fill Quantity: 8 ml,   # refills: 0  Last Office Visit : 1-  Future Office visit:  none    Routing refill request to provider for review/approval because:  Not on protocol.      Kathleen M Doege RN

## 2020-05-18 RX ORDER — ADALIMUMAB 40MG/0.4ML
40 KIT SUBCUTANEOUS
Qty: 8 EACH | Refills: 12 | Status: SHIPPED | OUTPATIENT
Start: 2020-05-18 | End: 2021-04-23

## 2020-05-20 DIAGNOSIS — I10 HYPERTENSION GOAL BP (BLOOD PRESSURE) < 140/90: ICD-10-CM

## 2020-05-20 DIAGNOSIS — E87.6 HYPOPOTASSEMIA: ICD-10-CM

## 2020-05-25 NOTE — TELEPHONE ENCOUNTER
Routing refill request to provider for review/approval because:  Patient needs to be seen because it has been more than 1 year since last office visit.  LISINOPRIL  ast Written Prescription Date:  5/21/19  Last Fill Quantity: 90,  # refills: 3     TRIAMTERENE /HCTZ  Last Written Prescription Date:  11/18/19  Last Fill Quantity: 180,  # refills: 1   Last office visit: 5/21/2019 with prescribing provider:     Future Office Visit:      3 month supply of each pended.  Routing to scheduling as well for appointment fo med check/blood pressure check

## 2020-05-29 RX ORDER — LISINOPRIL 40 MG/1
TABLET ORAL
Qty: 90 TABLET | Refills: 0 | OUTPATIENT
Start: 2020-05-29

## 2020-05-29 RX ORDER — TRIAMTERENE AND HYDROCHLOROTHIAZIDE 37.5; 25 MG/1; MG/1
CAPSULE ORAL
Qty: 180 CAPSULE | Refills: 0 | OUTPATIENT
Start: 2020-05-29

## 2020-06-01 ENCOUNTER — VIRTUAL VISIT (OUTPATIENT)
Dept: FAMILY MEDICINE | Facility: CLINIC | Age: 60
End: 2020-06-01
Payer: COMMERCIAL

## 2020-06-01 VITALS — BODY MASS INDEX: 30.73 KG/M2 | WEIGHT: 180 LBS | HEIGHT: 64 IN

## 2020-06-01 DIAGNOSIS — Z12.11 SCREENING FOR COLORECTAL CANCER: ICD-10-CM

## 2020-06-01 DIAGNOSIS — R73.9 ELEVATED BLOOD SUGAR: ICD-10-CM

## 2020-06-01 DIAGNOSIS — Z12.12 SCREENING FOR COLORECTAL CANCER: ICD-10-CM

## 2020-06-01 DIAGNOSIS — Z13.220 LIPID SCREENING: ICD-10-CM

## 2020-06-01 DIAGNOSIS — Z12.31 VISIT FOR SCREENING MAMMOGRAM: ICD-10-CM

## 2020-06-01 DIAGNOSIS — F42.9 OBSESSIVE-COMPULSIVE DISORDER, UNSPECIFIED TYPE: ICD-10-CM

## 2020-06-01 DIAGNOSIS — E78.00 HYPERCHOLESTEREMIA: ICD-10-CM

## 2020-06-01 DIAGNOSIS — E87.6 HYPOPOTASSEMIA: ICD-10-CM

## 2020-06-01 DIAGNOSIS — I10 HYPERTENSION GOAL BP (BLOOD PRESSURE) < 140/90: Primary | ICD-10-CM

## 2020-06-01 DIAGNOSIS — L40.0 PLAQUE PSORIASIS: ICD-10-CM

## 2020-06-01 PROCEDURE — 99214 OFFICE O/P EST MOD 30 MIN: CPT | Mod: GT | Performed by: FAMILY MEDICINE

## 2020-06-01 RX ORDER — LISINOPRIL 40 MG/1
40 TABLET ORAL DAILY
Qty: 90 TABLET | Refills: 3 | Status: SHIPPED | OUTPATIENT
Start: 2020-06-01 | End: 2021-06-16

## 2020-06-01 RX ORDER — PIROXICAM 10 MG/1
CAPSULE ORAL
COMMUNITY
Start: 2020-05-20 | End: 2020-06-01

## 2020-06-01 RX ORDER — METOPROLOL TARTRATE 50 MG
50 TABLET ORAL DAILY
Qty: 90 TABLET | Refills: 3 | Status: SHIPPED | OUTPATIENT
Start: 2020-06-01 | End: 2021-07-07

## 2020-06-01 RX ORDER — TRIAMTERENE AND HYDROCHLOROTHIAZIDE 37.5; 25 MG/1; MG/1
CAPSULE ORAL
Qty: 180 CAPSULE | Refills: 1 | Status: SHIPPED | OUTPATIENT
Start: 2020-06-01 | End: 2020-12-07

## 2020-06-01 ASSESSMENT — MIFFLIN-ST. JEOR: SCORE: 1366.47

## 2020-06-01 NOTE — PROGRESS NOTES
"Maggy Goldberg is a 60 year old female who is being evaluated via a billable video visit.      The patient has been notified of following:     \"This video visit will be conducted via a call between you and your physician/provider. We have found that certain health care needs can be provided without the need for an in-person physical exam.  This service lets us provide the care you need with a video conversation.  If a prescription is necessary we can send it directly to your pharmacy.  If lab work is needed we can place an order for that and you can then stop by our lab to have the test done at a later time.    Video visits are billed at different rates depending on your insurance coverage.  Please reach out to your insurance provider with any questions.    If during the course of the call the physician/provider feels a video visit is not appropriate, you will not be charged for this service.\"    Patient has given verbal consent for Video visit? Yes    How would you like to obtain your AVS? [place]    Patient would like the video invitation sent by: Text to cell phone: [phone] (M)    Will anyone else be joining your video visit? No      Subjective     Maggy Goldberg is a 60 year old female who presents today via video visit for the following health issues:    HPI     Hypertension Follow-up      Do you check your blood pressure regularly outside of the clinic? Yes     Are you following a low salt diet? Yes    Are your blood pressures ever more than 140 on the top number (systolic) OR more   than 90 on the bottom number (diastolic), for example 140/90? No      How many servings of fruits and vegetables do you eat daily?  3-4    On average, how many sweetened beverages do you drink each day (Examples: soda, juice, sweet tea, etc.  Do NOT count diet or artificially sweetened beverages)?   0    How many days per week do you exercise enough to make your heart beat faster?NOT CURRENTLY     How many minutes a day do you " exercise enough to make your heart beat faster? NOT CURRENTLY     How many days per week do you miss taking your medication? 0         Video Start Time: 3:44 PM         Patient Active Problem List   Diagnosis     Adjustment disorder with mixed anxiety and depressed mood     Contact dermatitis and other eczema, due to unspecified cause     Allergic rhinitis     CARDIOVASCULAR SCREENING; LDL GOAL LESS THAN 100     CMC arthritis, thumb, degenerative     OCD (obsessive compulsive disorder)     Hypertension goal BP (blood pressure) < 140/90     Atypical nevi     Psoriasis     Hypercholesteremia     Lentigo     Plaque psoriasis     Lipoma of lower extremity, unspecified laterality     Achrochordon     Dermatofibroma     Skin cancer screening     Cervical segment dysfunction     Cervicalgia     Thoracic segment dysfunction     Right shoulder pain, unspecified chronicity     Bicipital tendinitis of right shoulder     No past surgical history on file.    Social History     Tobacco Use     Smoking status: Former Smoker     Types: Cigarettes     Smokeless tobacco: Never Used     Tobacco comment:  PPD   Substance Use Topics     Alcohol use: Yes     Comment: a couple per day     Family History   Problem Relation Age of Onset     Cancer Mother         skin cancer     Depression Mother      Osteoporosis Mother      Hypertension Father      Cancer Father          of lung cancer     Obesity Father      Diabetes Maternal Grandmother      Alcohol/Drug Paternal Grandfather         alcohol     Asthma Maternal Uncle      Blood Disease Son         kasandra nassar     Cancer Maternal Aunt         not sure what     Cancer Maternal Aunt         not sure what     Depression Brother      Thyroid Disease Paternal Uncle          Current Outpatient Medications   Medication Sig Dispense Refill     adalimumab (HUMIRA *CF* PEN) 40 MG/0.4ML pen kit Inject 0.4 mLs (40 mg) Subcutaneous every 14 days 8 each 12     adalimumab (HUMIRA *CF*) 80  MG/0.8ML & 40MG/0.4ML pen kit Inject 80 mg Subcutaneous once On day 1 and then inject 40 mg every other week thereafter beginning on Day 8.       ASPIRIN 325 MG OR TABS one daily       buPROPion (WELLBUTRIN XL) 150 MG 24 hr tablet        buPROPion (WELLBUTRIN) 100 MG tablet Take by mouth daily       clobetasol (TEMOVATE) 0.05 % external cream Apply topically BID to AA on arms and legs, avoid face, neck and genital area 60 g 3     clonazePAM (KLONOPIN) 1 MG tablet Take 1 mg by mouth 2 times daily       desonide (DESOWEN) 0.05 % external cream Apply topically BID to the nose 60 g 3     lisinopril (PRINIVIL/ZESTRIL) 40 MG tablet Take 1 tablet (40 mg) by mouth daily 90 tablet 3     metoprolol tartrate (LOPRESSOR) 50 MG tablet TAKE ONE TABLET BY MOUTH TWO TIMES A  tablet 3     Sertraline HCl (ZOLOFT PO) Take 200 mg by mouth daily       tacrolimus (PROTOPIC) 0.1 % external ointment Apply topically BID to AA - alternating with topical corticosteroids 60 g 3     triamterene-HCTZ (DYAZIDE) 37.5-25 MG capsule TAKE TWO CAPSULES BY MOUTH ONCE DAILY 180 capsule 1     ACCU-CHEK FASTCLIX LANCETS MISC 1 lancet daily. 100 each prn     ORDER FOR DME 1 Device continuous prn (and wear at HS also). One medium B&C thumbkeeper 1 Device 0     piroxicam (FELDENE) 10 MG capsule        Allergies   Allergen Reactions     Pertussis Vaccine      Amoxicillin Rash     Clindamycin Rash     Recent Labs   Lab Test 02/05/20  1202 10/15/19  1449 05/21/19  1043 03/22/18  1158 08/01/17  1611  04/13/17  1227  11/19/15  0953  02/24/14  1217   A1C  --   --  5.7* 5.6  --   --   --   --  6.0   < > 6.2*   LDL  --   --  142* 219*  --   --  118*  --  119*   < > 127   HDL  --   --  61 68  --   --  80  --  83   < > 59   TRIG  --   --  138 168*  --   --  154*  --  178*   < > 228*   ALT 56* 31  --   --  23  --   --    < >  --   --  39   CR 0.96 0.78 0.86 0.93 0.88   < >  --    < > 0.81   < > 0.63   GFRESTIMATED 64 82 73 62 66   < >  --    < > 73   < > >90  "  GFRESTBLACK 74 >90 85 75 80   < >  --    < > 89   < > >90   POTASSIUM 3.9 4.1 4.4 4.0 3.5   < >  --    < > 4.2   < > 3.4   TSH  --   --   --   --   --   --   --   --   --   --  1.92    < > = values in this interval not displayed.      BP Readings from Last 3 Encounters:   07/12/19 104/64   07/02/19 116/83   05/21/19 120/76    Wt Readings from Last 3 Encounters:   06/01/20 81.6 kg (180 lb)   07/12/19 85.3 kg (188 lb)   07/02/19 86.2 kg (190 lb)                    Reviewed and updated as needed this visit by Provider         Review of Systems   Constitutional, HEENT, cardiovascular, pulmonary, gi and gu systems are negative, except as otherwise noted.      Objective    LMP 10/19/2010   Estimated body mass index is 32.52 kg/m  as calculated from the following:    Height as of 5/21/19: 1.619 m (5' 3.75\").    Weight as of 7/12/19: 85.3 kg (188 lb).  Physical Exam     GENERAL: Healthy, alert and no distress  EYES: Eyes grossly normal to inspection.  No discharge or erythema, or obvious scleral/conjunctival abnormalities.  RESP: No audible wheeze, cough, or visible cyanosis.  No visible retractions or increased work of breathing.    SKIN: Visible skin clear. No significant rash, abnormal pigmentation or lesions.  NEURO: Cranial nerves grossly intact.  Mentation and speech appropriate for age.  PSYCH: Mentation appears normal, affect normal/bright, judgement and insight intact, normal speech and appearance well-groomed.    The 10-year ASCVD risk score (Silvino EDITA Jr., et al., 2013) is: 3.4%    Values used to calculate the score:      Age: 60 years      Sex: Female      Is Non- : No      Diabetic: No      Tobacco smoker: No      Systolic Blood Pressure: 104 mmHg      Is BP treated: Yes      HDL Cholesterol: 61 mg/dL      Total Cholesterol: 231 mg/dL    Diagnostic Test Results:  Labs reviewed in Epic        Assessment & Plan         1.  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  Will schedule lab and BP check. Check CMP, urina albumin.     - metoprolol tartrate (LOPRESSOR) 50 MG tablet; Take 1 tablet (50 mg) by mouth s daily  Dispense: 90 tablet; Refill: 3  - lisinopril (PRINIVIL/ZESTRIL) 40 MG tablet; Take 1 tablet (40 mg) by mouth daily  Dispense: 90 tablet; Refill: 3  - triamterene-hydrochlorothiazide (DYAZIDE) 37.5-25 MG per capsule; TAKE TWO CAPSULES BY MOUTH EVERY DAY  Dispense: 180 capsule; Refill: 3     2. Hypercholesteremia   Risk score 3.4% based on previous lipid screening. Will schedule lab for lipid screening.     If elevated risk score,  she is open to starting statin and would send to her pharmacy.  Had been on the statin in the past.  Denies any intolerance of the statin.  She is not sure when she was on it or why it was discontinued.  - Lipid panel reflex to direct LDL Fasting     3. Elevated blood sugar     - Hemoglobin A1c     4. Hypopotassemia   patient continues on dyazide 37.5-25 mg BID   - triamterene-hydrochlorothiazide (DYAZIDE) 37.5-25 MG per capsule; TAKE TWO CAPSULES BY MOUTH EVERY DAY  Dispense: 180 capsule; Refill: 3     5. OCD  Followed by psychiatry and continues on wellbutrin and sertraline. Has klonopin to use 1mg twice daily     6. Plaque psoriasis  follows with dermatology      Health Maintenance  Mammogram is due, Next pap due 2021 by current guidelines with negative pap and neg high risk HPV in 2016.   FIT test due    She will be having shoulder surgery soon and will schedule preop (may get her labs at that time)    No follow-ups on file.    Stacia Rodriguez MD   [facility]      Video-Visit Details    Type of service:  Video Visit    Video End Time:3:59 PM    Originating Location (pt. Location): Home    Distant Location (provider location):   [facility]     Platform used for Video Visit: Beronica    No follow-ups on file.       Stacia Rodriguez MD, MD

## 2020-06-08 ENCOUNTER — TRANSFERRED RECORDS (OUTPATIENT)
Dept: HEALTH INFORMATION MANAGEMENT | Facility: CLINIC | Age: 60
End: 2020-06-08

## 2020-12-02 DIAGNOSIS — I10 HYPERTENSION GOAL BP (BLOOD PRESSURE) < 140/90: ICD-10-CM

## 2020-12-02 DIAGNOSIS — E87.6 HYPOPOTASSEMIA: ICD-10-CM

## 2020-12-04 NOTE — TELEPHONE ENCOUNTER
BP Readings from Last 6 Encounters:   07/12/19 104/64   07/02/19 116/83   05/21/19 120/76   03/22/18 115/72   08/01/17 (!) 89/54   06/20/17 106/62

## 2020-12-07 RX ORDER — TRIAMTERENE AND HYDROCHLOROTHIAZIDE 37.5; 25 MG/1; MG/1
CAPSULE ORAL
Qty: 180 CAPSULE | Refills: 1 | Status: SHIPPED | OUTPATIENT
Start: 2020-12-07 | End: 2021-06-16

## 2021-01-14 ENCOUNTER — HEALTH MAINTENANCE LETTER (OUTPATIENT)
Age: 61
End: 2021-01-14

## 2021-03-30 ENCOUNTER — VIRTUAL VISIT (OUTPATIENT)
Dept: URGENT CARE | Facility: CLINIC | Age: 61
End: 2021-03-30
Payer: COMMERCIAL

## 2021-03-30 DIAGNOSIS — Z20.822 SUSPECTED COVID-19 VIRUS INFECTION: Primary | ICD-10-CM

## 2021-03-30 PROCEDURE — 99213 OFFICE O/P EST LOW 20 MIN: CPT | Mod: TEL

## 2021-03-30 NOTE — PROGRESS NOTES
"  Maggy Goldberg is a 61 year old female who is being evaluated via a billable telephone visit.      The patient has been notified of following:     \"This telephone visit will be conducted via a phone call between you and your physician/provider. We have found that certain health care needs can be provided without the need for a physical exam.  This service lets us provide the care you need with a phone conversation.  If a prescription is necessary we can send it directly to your pharmacy.  If lab work is needed we can place an order for that and you can then stop by our lab to have the test done at a later time.\"     Patient has given verbal consent for telephone visit?  Yes    SUBJECTIVE:  Maggy Goldberg is an 61 year old female who presents for needing covid test.  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  Today tested positive.  Needs a f/u pcr test.   Pt has some nasal congestion which she thought was allergies.  Temp of 99. A little chills.  No cough.  No n/v/d.  No skin rashes.  No shortness of breath.  Some headache.  No body aches.  No change in taste or smell.  No known exposures but works in nursing home.    PMH:   has a past medical history of Adjustment disorder with mixed anxiety and depressed mood, CARDIOVASCULAR SCREENING; LDL GOAL LESS THAN 100 (5/9/2010), CARDIOVASCULAR SCREENING; LDL GOAL LESS THAN 160, CMC arthritis, thumb, degenerative, Contact dermatitis and other eczema, due to unspecified cause, HTN (hypertension), Hypercholesteremia, Hypertension goal BP (blood pressure) < 140/90, Iritis (2010), OCD (obsessive compulsive disorder), Psoriasis, Type II or unspecified type diabetes mellitus without mention of complication, not stated as uncontrolled, and Uncomplicated type 2 diabetes mellitus (H) (10/14/2011).  Patient Active Problem List   Diagnosis     Adjustment disorder with mixed anxiety and depressed mood     Contact dermatitis and other eczema, due to unspecified cause     " Allergic rhinitis     CARDIOVASCULAR SCREENING; LDL GOAL LESS THAN 100     CMC arthritis, thumb, degenerative     OCD (obsessive compulsive disorder)     Hypertension goal BP (blood pressure) < 140/90     Atypical nevi     Psoriasis     Hypercholesteremia     Lentigo     Plaque psoriasis     Lipoma of lower extremity, unspecified laterality     Achrochordon     Dermatofibroma     Skin cancer screening     Cervical segment dysfunction     Cervicalgia     Thoracic segment dysfunction     Right shoulder pain, unspecified chronicity     Bicipital tendinitis of right shoulder     Social History     Socioeconomic History     Marital status: Single     Spouse name: Not on file     Number of children: 1     Years of education: Not on file     Highest education level: Not on file   Occupational History     Occupation: health unit coordinator     Employer: [email or web address]   Social Needs     Financial resource strain: Not on file     Food insecurity     Worry: Not on file     Inability: Not on file     Transportation needs     Medical: Not on file     Non-medical: Not on file   Tobacco Use     Smoking status: Former Smoker     Types: Cigarettes     Smokeless tobacco: Never Used     Tobacco comment: 1/2 PPD   Substance and Sexual Activity     Alcohol use: Yes     Comment: a couple per day     Drug use: No     Sexual activity: Yes     Partners: Male     Birth control/protection: Surgical     Comment: tubes tied   Lifestyle     Physical activity     Days per week: Not on file     Minutes per session: Not on file     Stress: Not on file   Relationships     Social connections     Talks on phone: Not on file     Gets together: Not on file     Attends Latter day service: Not on file     Active member of club or organization: Not on file     Attends meetings of clubs or organizations: Not on file     Relationship status: Not on file     Intimate partner violence     Fear of current or ex partner: Not on file     Emotionally  abused: Not on file     Physically abused: Not on file     Forced sexual activity: Not on file   Other Topics Concern     Parent/sibling w/ CABG, MI or angioplasty before 65F 55M? No   Social History Narrative    Balanced Diet - Yes    Osteoporosis Prevention Measures - Dairy servings per day: 4    Regular Exercise -  Yes Describe walking 15 miles a week    Dental Exam - YES - Date:     Eye Exam - NO, would like to get a referral to see an eye doctor    Self Breast Exam - Yes    Abuse: Current or Past (Physical, Sexual or Emotional)- No    Do you feel safe in your environment - Yes    Guns stored in the home - No    Sunscreen used - No    Seatbelts used - No, except [place] the highway    Lipids -  YES- 08    Glucose -  YES-08    Colon Cancer Screening - No    Hemoccults - NO    Pap Test -  YES - Date: 07    Do you have any concerns about STD's -  No    Mammography - YES - Date: 07    DEXA - NO    Immunizations reviewed and up to date - about 9 years ago    Emerald Pace ma  2009                 Family History   Problem Relation Age of Onset     Cancer Mother         skin cancer     Depression Mother      Osteoporosis Mother      Hypertension Father      Cancer Father          of lung cancer     Obesity Father      Diabetes Maternal Grandmother      Alcohol/Drug Paternal Grandfather         alcohol     Asthma Maternal Uncle      Blood Disease Son         kasandra nassar     Cancer Maternal Aunt         not sure what     Cancer Maternal Aunt         not sure what     Depression Brother      Thyroid Disease Paternal Uncle        ALLERGIES:  Pertussis vaccine, Amoxicillin, and Clindamycin    Current Outpatient Medications   Medication     adalimumab (HUMIRA *CF* PEN) 40 MG/0.4ML pen kit     adalimumab (HUMIRA *CF*) 80 MG/0.8ML & 40MG/0.4ML pen kit     ASPIRIN 325 MG OR TABS     buPROPion (WELLBUTRIN XL) 150 MG 24 hr tablet     buPROPion (WELLBUTRIN) 100 MG tablet     clobetasol  (TEMOVATE) 0.05 % external cream     clonazePAM (KLONOPIN) 1 MG tablet     desonide (DESOWEN) 0.05 % external cream     lisinopril (ZESTRIL) 40 MG tablet     metoprolol tartrate (LOPRESSOR) 50 MG tablet     ORDER FOR DME     Sertraline HCl (ZOLOFT PO)     tacrolimus (PROTOPIC) 0.1 % external ointment     triamterene-HCTZ (DYAZIDE) 37.5-25 MG capsule     No current facility-administered medications for this visit.          ROS:  ROS is done and is negative for general/constitutional, eye, ENT, Respiratory, cardiovascular, GI, , Skin, musculoskeletal except as noted elsewhere.  All other review of systems negative except as noted elsewhere.      OBJECTIVE:  LMP 10/19/2010   GENERAL : Alert and oriented.  Did not seem to be in distress.   RESP: No respiratory distress detected during phone conversation         ASSESSMENT/PLAN:    ASSESSMENT / PLAN:  (Z20.822) Suspected COVID-19 virus infection  (primary encounter diagnosis)  Comment: mild sxs but positive rapid test at work, so suspect for covid.  Will repeat pcr test as her work requires that  Plan: Symptomatic COVID-19 Virus (Coronavirus) by PCR        Reviewed testing scheduling process.  Reviewed quarantine. I reviewed supportive care, otc meds to use if needed, expected course, and signs of concern.  Follow up as needed or if she does not improve within 1 week(s) or if worsens in any way.  Reviewed red flag symptoms and is to go to the ER if experiences any of these.          See [facility] for orders, medications, letters, patient instructions    Isabela Liu MD  3/30/2021, 4:18 PM      Phone call duration:  8 minutes

## 2021-03-31 DIAGNOSIS — Z20.822 SUSPECTED COVID-19 VIRUS INFECTION: ICD-10-CM

## 2021-03-31 LAB
LABORATORY COMMENT REPORT: ABNORMAL
SARS-COV-2 RNA RESP QL NAA+PROBE: NORMAL
SARS-COV-2 RNA RESP QL NAA+PROBE: POSITIVE
SPECIMEN SOURCE: ABNORMAL
SPECIMEN SOURCE: NORMAL

## 2021-03-31 PROCEDURE — U0005 INFEC AGEN DETEC AMPLI PROBE: HCPCS | Performed by: INTERNAL MEDICINE

## 2021-03-31 PROCEDURE — U0003 INFECTIOUS AGENT DETECTION BY NUCLEIC ACID (DNA OR RNA); SEVERE ACUTE RESPIRATORY SYNDROME CORONAVIRUS 2 (SARS-COV-2) (CORONAVIRUS DISEASE [COVID-19]), AMPLIFIED PROBE TECHNIQUE, MAKING USE OF HIGH THROUGHPUT TECHNOLOGIES AS DESCRIBED BY CMS-2020-01-R: HCPCS | Performed by: INTERNAL MEDICINE

## 2021-04-22 DIAGNOSIS — L40.9 PSORIASIS: ICD-10-CM

## 2021-04-23 NOTE — TELEPHONE ENCOUNTER
HUMIRA PEN 0.4ML 2'S 40MG      Last Written Prescription Date:  5/18/20  Last Fill Quantity: 8 each,   # refills: 12  Last Office Visit : 1/27/20 recommended 3 month follow up  Future Office visit:  None scheduled    Routing refill request to provider for review/approval because:  Drug not on derm refill protocol

## 2021-04-26 RX ORDER — ADALIMUMAB 40MG/0.4ML
40 KIT SUBCUTANEOUS
Qty: 2 EACH | Refills: 0 | Status: SHIPPED | OUTPATIENT
Start: 2021-04-26 | End: 2021-06-18

## 2021-06-14 DIAGNOSIS — I10 HYPERTENSION GOAL BP (BLOOD PRESSURE) < 140/90: ICD-10-CM

## 2021-06-14 DIAGNOSIS — E87.6 HYPOPOTASSEMIA: ICD-10-CM

## 2021-06-15 DIAGNOSIS — L40.9 PSORIASIS: ICD-10-CM

## 2021-06-16 RX ORDER — LISINOPRIL 40 MG/1
40 TABLET ORAL DAILY
Qty: 30 TABLET | Refills: 0 | Status: SHIPPED | OUTPATIENT
Start: 2021-06-16 | End: 2021-08-04

## 2021-06-16 RX ORDER — TRIAMTERENE AND HYDROCHLOROTHIAZIDE 37.5; 25 MG/1; MG/1
CAPSULE ORAL
Qty: 60 CAPSULE | Refills: 0 | Status: SHIPPED | OUTPATIENT
Start: 2021-06-16 | End: 2021-08-04

## 2021-06-17 RX ORDER — ADALIMUMAB 40MG/0.4ML
KIT SUBCUTANEOUS
Refills: 12 | OUTPATIENT
Start: 2021-06-17

## 2021-06-18 ENCOUNTER — VIRTUAL VISIT (OUTPATIENT)
Dept: DERMATOLOGY | Facility: CLINIC | Age: 61
End: 2021-06-18
Payer: COMMERCIAL

## 2021-06-18 DIAGNOSIS — L40.9 PSORIASIS: Primary | ICD-10-CM

## 2021-06-18 DIAGNOSIS — Z51.81 MEDICATION MONITORING ENCOUNTER: ICD-10-CM

## 2021-06-18 PROCEDURE — 99214 OFFICE O/P EST MOD 30 MIN: CPT | Mod: GQ | Performed by: PHYSICIAN ASSISTANT

## 2021-06-18 RX ORDER — ADALIMUMAB 40MG/0.4ML
40 KIT SUBCUTANEOUS
Qty: 6 EACH | Refills: 1 | Status: SHIPPED | OUTPATIENT
Start: 2021-06-18 | End: 2021-12-14

## 2021-06-18 RX ORDER — GABAPENTIN 300 MG/1
100 CAPSULE ORAL DAILY
COMMUNITY

## 2021-06-18 NOTE — LETTER
6/18/2021     RE: Maggy Goldberg  [address]  [facility] [address]     Dear Colleague,    Thank you for referring your patient, Maggy Goldberg, to the [facility] [place] at [facility]. Please see a copy of my visit note below.    [facility] Dermatology Note  Encounter Date: Jun 18, 2021  [employer] and Telephone ([phone] ). Location of teledermatologist: [facility] [place].  Length of call: 7min    Dermatology Problem List:   1. Psoriasis  -Current tx: Humira, desonide 0.05%, clobetasol 0.05%, protopic 0.1%   2. 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  ___________________________________________    Assessment & Plan:   # Psoriasis - Less than 5% BSA- very stable  -Continue Humira 40mg/0.8ml every two weeks  -Restart clobetasol 0.05% cream to elbows and shins, prescription refilled today  -Restart desonide 0.05% cream to nasal dorsum, prescription refilled today  -Restart Protopic 0.1% ointment on areas when not flaring, prescription refilled today  -Recommended patient start moisturizing regularly  -The following labs were obtained: CBC, CMP - next TB Quant test     Plan for FBSE in 6mo    Procedures Performed:    None    Follow-up: 6 month(s) in-person, or earlier for new or changing lesions    Staff:     All risks, benefits and alternatives were discussed with patient.  Patient is in agreement and understands the assessment and plan.  All questions were answered.    Munira Florence PA-C, MPAS  [facility] [place]: Phone: [phone], Fax: [phone]  [facility]: Phone: [phone],  Fax: [phone]  ____________________________________________    CC: Psoriasis  (Maggy is here for follow up of her psoriasis. She has no complaints today.)    HPI:  Ms. Maggy Goldberg is a(n) 61 year old female who presents today as a return patient for PSO follow up. On Humira every two weeks, doing really well. Very stable.     Does still get very tiny pencil eraser sized areas all over, but none that bother her, not using topicals at all. No joint pain. Did have COVID this past April but was asx and did fully recover. She did not hold her Humira. Very happy with her treatment. no recent I illness or hospitalizations.      Patient is otherwise feeling well, without additional skin concerns.    Labs Reviewed:  CBC, CMP and TB gold ordered    Physical Exam:  Vitals: LMP 10/19/2010   SKIN: Teledermatology photos were reviewed; image quality and interpretability: acceptable. Image date: 6/18/21  - small erythematous plaque on the R elbow, legs and chest are clear  - No other lesions of concern on areas examined.     Medications:  Current Outpatient Medications   Medication     adalimumab (HUMIRA *CF* PEN) 40 MG/0.4ML pen kit     adalimumab (HUMIRA *CF*) 80 MG/0.8ML & 40MG/0.4ML pen kit     ASPIRIN 325 MG OR TABS     buPROPion (WELLBUTRIN XL) 150 MG 24 hr tablet     buPROPion (WELLBUTRIN) 100 MG tablet     clonazePAM (KLONOPIN) 1 MG tablet     gabapentin (NEURONTIN) 300 MG capsule     lisinopril (ZESTRIL) 40 MG tablet     metoprolol tartrate (LOPRESSOR) 50 MG tablet     Sertraline HCl (ZOLOFT PO)     triamterene-HCTZ (DYAZIDE) 37.5-25 MG capsule     clobetasol (TEMOVATE) 0.05 % external cream     desonide (DESOWEN) 0.05 % external cream     ORDER FOR DME     tacrolimus (PROTOPIC) 0.1 % external ointment     No current facility-administered medications for this visit.       Past Medical/Surgical History:   Patient Active Problem List   Diagnosis     Adjustment disorder with mixed anxiety and depressed mood     Contact dermatitis and other eczema, due to unspecified cause     Allergic rhinitis      CARDIOVASCULAR SCREENING; LDL GOAL LESS THAN 100     CMC arthritis, thumb, degenerative     OCD (obsessive compulsive disorder)     Hypertension goal BP (blood pressure) < 140/90     Atypical nevi     Psoriasis     Hypercholesteremia     Lentigo     Plaque psoriasis     Lipoma of lower extremity, unspecified laterality     Achrochordon     Dermatofibroma     Skin cancer screening     Cervical segment dysfunction     Cervicalgia     Thoracic segment dysfunction     Right shoulder pain, unspecified chronicity     Bicipital tendinitis of right shoulder     Past Medical History:   Diagnosis Date     Adjustment disorder with mixed anxiety and depressed mood      CARDIOVASCULAR SCREENING; LDL GOAL LESS THAN 100 5/9/2010     CARDIOVASCULAR SCREENING; LDL GOAL LESS THAN 160      CMC arthritis, thumb, degenerative      Contact dermatitis and other eczema, due to unspecified cause     contact and exzema     HTN (hypertension)      Hypercholesteremia      Hypertension goal BP (blood pressure) < 140/90      Iritis 2010     OCD (obsessive compulsive disorder)      Psoriasis      Type II or unspecified type diabetes mellitus without mention of complication, not stated as uncontrolled      Uncomplicated type 2 diabetes mellitus (H) 10/14/2011     CC Dr. Barragan on file on close of this encounter.

## 2021-06-18 NOTE — PROGRESS NOTES
[facility] Dermatology Note  Encounter Date: Jun 18, 2021  Store-and-Forward and Telephone ([phone] ). Location of teledermatologist: [facility] [place].  Length of call: 7min    Dermatology Problem List:   1. Psoriasis  -Current tx: Humira, desonide 0.05%, clobetasol 0.05%, protopic 0.1%   2. 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  ___________________________________________    Assessment & Plan:   # Psoriasis - Less than 5% BSA- very stable  -Continue Humira 40mg/0.8ml every two weeks  -Restart clobetasol 0.05% cream to elbows and shins, prescription refilled today  -Restart desonide 0.05% cream to nasal dorsum, prescription refilled today  -Restart Protopic 0.1% ointment on areas when not flaring, prescription refilled today  -Recommended patient start moisturizing regularly  -The following labs were obtained: CBC, CMP - next TB Quant test     Plan for FBSE in 6mo    Procedures Performed:    None    Follow-up: 6 month(s) in-person, or earlier for new or changing lesions    Staff:     All risks, benefits and alternatives were discussed with patient.  Patient is in agreement and understands the assessment and plan.  All questions were answered.    Munira Florence PA-C, MPAS  [facility] [place]: Phone: [phone], Fax: [phone]  [facility]: Phone: [phone],  Fax: [phone]  ____________________________________________    CC: Psoriasis (Maggy is here for follow up of her psoriasis. She has no complaints today.)    HPI:  Ms. Maggy Goldberg is a(n) 61 year old female who presents today as a return patient for PSO follow up. On Humira every two weeks, doing really well. Very stable.     Does still get very tiny pencil eraser sized areas all over,  but none that bother her, not using topicals at all. No joint pain. Did have COVID this past April but was asx and did fully recover. She did not hold her Humira. Very happy with her treatment. no recent I illness or hospitalizations.      Patient is otherwise feeling well, without additional skin concerns.    Labs Reviewed:  CBC, CMP and TB gold ordered    Physical Exam:  Vitals: LMP 10/19/2010   SKIN: Teledermatology photos were reviewed; image quality and interpretability: acceptable. Image date: 6/18/21  - small erythematous plaque on the R elbow, legs and chest are clear  - No other lesions of concern on areas examined.     Medications:  Current Outpatient Medications   Medication     adalimumab (HUMIRA *CF* PEN) 40 MG/0.4ML pen kit     adalimumab (HUMIRA *CF*) 80 MG/0.8ML & 40MG/0.4ML pen kit     ASPIRIN 325 MG OR TABS     buPROPion (WELLBUTRIN XL) 150 MG 24 hr tablet     buPROPion (WELLBUTRIN) 100 MG tablet     clonazePAM (KLONOPIN) 1 MG tablet     gabapentin (NEURONTIN) 300 MG capsule     lisinopril (ZESTRIL) 40 MG tablet     metoprolol tartrate (LOPRESSOR) 50 MG tablet     Sertraline HCl (ZOLOFT PO)     triamterene-HCTZ (DYAZIDE) 37.5-25 MG capsule     clobetasol (TEMOVATE) 0.05 % external cream     desonide (DESOWEN) 0.05 % external cream     ORDER FOR DME     tacrolimus (PROTOPIC) 0.1 % external ointment     No current facility-administered medications for this visit.       Past Medical/Surgical History:   Patient Active Problem List   Diagnosis     Adjustment disorder with mixed anxiety and depressed mood     Contact dermatitis and other eczema, due to unspecified cause     Allergic rhinitis     CARDIOVASCULAR SCREENING; LDL GOAL LESS THAN 100     CMC arthritis, thumb, degenerative     OCD (obsessive compulsive disorder)     Hypertension goal BP (blood pressure) < 140/90     Atypical nevi     Psoriasis     Hypercholesteremia     Lentigo     Plaque psoriasis     Lipoma of lower extremity, unspecified  laterality     Achrochordon     Dermatofibroma     Skin cancer screening     Cervical segment dysfunction     Cervicalgia     Thoracic segment dysfunction     Right shoulder pain, unspecified chronicity     Bicipital tendinitis of right shoulder     Past Medical History:   Diagnosis Date     Adjustment disorder with mixed anxiety and depressed mood      CARDIOVASCULAR SCREENING; LDL GOAL LESS THAN 100 5/9/2010     CARDIOVASCULAR SCREENING; LDL GOAL LESS THAN 160      CMC arthritis, thumb, degenerative      Contact dermatitis and other eczema, due to unspecified cause     contact and exzema     HTN (hypertension)      Hypercholesteremia      Hypertension goal BP (blood pressure) < 140/90      Iritis 2010     OCD (obsessive compulsive disorder)      Psoriasis      Type II or unspecified type diabetes mellitus without mention of complication, not stated as uncontrolled      Uncomplicated type 2 diabetes mellitus (H) 10/14/2011       CC Dr. Barragan on file on close of this encounter.

## 2021-06-19 DIAGNOSIS — L40.9 PSORIASIS: ICD-10-CM

## 2021-06-22 RX ORDER — ADALIMUMAB 40MG/0.4ML
KIT SUBCUTANEOUS
Refills: 12 | OUTPATIENT
Start: 2021-06-22

## 2021-07-09 DIAGNOSIS — Z51.81 MEDICATION MONITORING ENCOUNTER: ICD-10-CM

## 2021-07-09 LAB
ALBUMIN SERPL-MCNC: 3.7 G/DL (ref 3.4–5)
ALP SERPL-CCNC: 63 U/L (ref 40–150)
ALT SERPL W P-5'-P-CCNC: 38 U/L (ref 0–50)
ANION GAP SERPL CALCULATED.3IONS-SCNC: 6 MMOL/L (ref 3–14)
AST SERPL W P-5'-P-CCNC: 32 U/L (ref 0–45)
BILIRUB SERPL-MCNC: 0.4 MG/DL (ref 0.2–1.3)
BUN SERPL-MCNC: 23 MG/DL (ref 7–30)
CALCIUM SERPL-MCNC: 9.2 MG/DL (ref 8.5–10.1)
CHLORIDE SERPL-SCNC: 106 MMOL/L (ref 94–109)
CO2 SERPL-SCNC: 23 MMOL/L (ref 20–32)
CREAT SERPL-MCNC: 1.12 MG/DL (ref 0.52–1.04)
GFR SERPL CREATININE-BSD FRML MDRD: 52 ML/MIN/{1.73_M2}
GLUCOSE SERPL-MCNC: 136 MG/DL (ref 70–99)
POTASSIUM SERPL-SCNC: 4.2 MMOL/L (ref 3.4–5.3)
PROT SERPL-MCNC: 7 G/DL (ref 6.8–8.8)
SODIUM SERPL-SCNC: 135 MMOL/L (ref 133–144)

## 2021-07-09 PROCEDURE — 80053 COMPREHEN METABOLIC PANEL: CPT | Performed by: PHYSICIAN ASSISTANT

## 2021-07-09 PROCEDURE — 86481 TB AG RESPONSE T-CELL SUSP: CPT | Performed by: PHYSICIAN ASSISTANT

## 2021-07-09 PROCEDURE — 36415 COLL VENOUS BLD VENIPUNCTURE: CPT | Performed by: PHYSICIAN ASSISTANT

## 2021-07-12 LAB
GAMMA INTERFERON BACKGROUND BLD IA-ACNC: 0.03 IU/ML
M TB IFN-G CD4+ BCKGRND COR BLD-ACNC: 9.97 IU/ML
M TB TUBERC IFN-G BLD QL: NEGATIVE
MITOGEN IGNF BCKGRD COR BLD-ACNC: 0.02 IU/ML
MITOGEN IGNF BCKGRD COR BLD-ACNC: 0.02 IU/ML

## 2021-08-03 PROBLEM — M99.02 THORACIC SEGMENT DYSFUNCTION: Status: RESOLVED | Noted: 2018-01-30 | Resolved: 2021-08-03

## 2021-08-04 ENCOUNTER — OFFICE VISIT (OUTPATIENT)
Dept: FAMILY MEDICINE | Facility: CLINIC | Age: 61
End: 2021-08-04
Payer: COMMERCIAL

## 2021-08-04 VITALS
SYSTOLIC BLOOD PRESSURE: 116 MMHG | TEMPERATURE: 96.9 F | HEIGHT: 64 IN | HEART RATE: 74 BPM | WEIGHT: 189 LBS | BODY MASS INDEX: 32.27 KG/M2 | OXYGEN SATURATION: 98 % | DIASTOLIC BLOOD PRESSURE: 70 MMHG

## 2021-08-04 DIAGNOSIS — E87.6 HYPOPOTASSEMIA: ICD-10-CM

## 2021-08-04 DIAGNOSIS — Z12.4 SCREENING FOR CERVICAL CANCER: ICD-10-CM

## 2021-08-04 DIAGNOSIS — E78.00 HYPERCHOLESTEREMIA: ICD-10-CM

## 2021-08-04 DIAGNOSIS — Z13.220 SCREENING FOR HYPERLIPIDEMIA: ICD-10-CM

## 2021-08-04 DIAGNOSIS — Z12.31 VISIT FOR SCREENING MAMMOGRAM: ICD-10-CM

## 2021-08-04 DIAGNOSIS — I10 HYPERTENSION GOAL BP (BLOOD PRESSURE) < 140/90: ICD-10-CM

## 2021-08-04 DIAGNOSIS — L40.9 PSORIASIS: ICD-10-CM

## 2021-08-04 DIAGNOSIS — Z12.11 SCREENING FOR COLORECTAL CANCER: ICD-10-CM

## 2021-08-04 DIAGNOSIS — R73.9 ELEVATED BLOOD SUGAR: ICD-10-CM

## 2021-08-04 DIAGNOSIS — Z12.11 SCREEN FOR COLON CANCER: ICD-10-CM

## 2021-08-04 DIAGNOSIS — F42.9 OBSESSIVE-COMPULSIVE DISORDER, UNSPECIFIED TYPE: ICD-10-CM

## 2021-08-04 DIAGNOSIS — Z12.12 SCREENING FOR COLORECTAL CANCER: ICD-10-CM

## 2021-08-04 DIAGNOSIS — Z00.00 ROUTINE GENERAL MEDICAL EXAMINATION AT A HEALTH CARE FACILITY: Primary | ICD-10-CM

## 2021-08-04 LAB — HBA1C MFR BLD: 5.1 % (ref 0–5.6)

## 2021-08-04 PROCEDURE — 99396 PREV VISIT EST AGE 40-64: CPT | Performed by: FAMILY MEDICINE

## 2021-08-04 PROCEDURE — 80048 BASIC METABOLIC PNL TOTAL CA: CPT | Performed by: FAMILY MEDICINE

## 2021-08-04 PROCEDURE — 87624 HPV HI-RISK TYP POOLED RSLT: CPT | Performed by: FAMILY MEDICINE

## 2021-08-04 PROCEDURE — 82043 UR ALBUMIN QUANTITATIVE: CPT | Performed by: FAMILY MEDICINE

## 2021-08-04 PROCEDURE — G0145 SCR C/V CYTO,THINLAYER,RESCR: HCPCS | Performed by: FAMILY MEDICINE

## 2021-08-04 PROCEDURE — 36415 COLL VENOUS BLD VENIPUNCTURE: CPT | Performed by: FAMILY MEDICINE

## 2021-08-04 PROCEDURE — 99214 OFFICE O/P EST MOD 30 MIN: CPT | Mod: 25 | Performed by: FAMILY MEDICINE

## 2021-08-04 PROCEDURE — 83036 HEMOGLOBIN GLYCOSYLATED A1C: CPT | Performed by: FAMILY MEDICINE

## 2021-08-04 PROCEDURE — 80061 LIPID PANEL: CPT | Performed by: FAMILY MEDICINE

## 2021-08-04 PROCEDURE — 77067 SCR MAMMO BI INCL CAD: CPT | Mod: TC | Performed by: RADIOLOGY

## 2021-08-04 ASSESSMENT — PATIENT HEALTH QUESTIONNAIRE - PHQ9
SUM OF ALL RESPONSES TO PHQ QUESTIONS 1-9: 6
10. IF YOU CHECKED OFF ANY PROBLEMS, HOW DIFFICULT HAVE THESE PROBLEMS MADE IT FOR YOU TO DO YOUR WORK, TAKE CARE OF THINGS AT HOME, OR GET ALONG WITH OTHER PEOPLE: SOMEWHAT DIFFICULT
SUM OF ALL RESPONSES TO PHQ QUESTIONS 1-9: 6

## 2021-08-04 ASSESSMENT — ANXIETY QUESTIONNAIRES
GAD7 TOTAL SCORE: 6
3. WORRYING TOO MUCH ABOUT DIFFERENT THINGS: NOT AT ALL
4. TROUBLE RELAXING: NOT AT ALL
7. FEELING AFRAID AS IF SOMETHING AWFUL MIGHT HAPPEN: NOT AT ALL
5. BEING SO RESTLESS THAT IT IS HARD TO SIT STILL: NOT AT ALL
6. BECOMING EASILY ANNOYED OR IRRITABLE: NEARLY EVERY DAY
GAD7 TOTAL SCORE: 6
7. FEELING AFRAID AS IF SOMETHING AWFUL MIGHT HAPPEN: NOT AT ALL
2. NOT BEING ABLE TO STOP OR CONTROL WORRYING: NOT AT ALL
8. IF YOU CHECKED OFF ANY PROBLEMS, HOW DIFFICULT HAVE THESE MADE IT FOR YOU TO DO YOUR WORK, TAKE CARE OF THINGS AT HOME, OR GET ALONG WITH OTHER PEOPLE?: SOMEWHAT DIFFICULT
GAD7 TOTAL SCORE: 6
1. FEELING NERVOUS, ANXIOUS, OR ON EDGE: NEARLY EVERY DAY

## 2021-08-04 ASSESSMENT — MIFFLIN-ST. JEOR: SCORE: 1402.3

## 2021-08-04 NOTE — PATIENT INSTRUCTIONS

## 2021-08-04 NOTE — RESULT ENCOUNTER NOTE
Excellent! Please call or sent a [employer] message if you have any questions. Stacia Rodriguez M.D.

## 2021-08-04 NOTE — PROGRESS NOTES
SUBJECTIVE:   CC: Maggy Goldberg is an 61 year old woman who presents for preventive health visit.     Healthy Habits:     Getting at least 3 servings of Calcium per day:  Yes    Bi-annual eye exam:  Yes    Dental care twice a year:  NO    Sleep apnea or symptoms of sleep apnea:  None    Diet:  Regular (no restrictions)    Frequency of exercise:  1 day/week    Duration of exercise:  15-30 minutes    Taking medications regularly:  Yes    Medication side effects:  None    PHQ-2 Total Score: 2    Additional concerns today:  No  Medication Refill         Today's PHQ-2 Score:   PHQ-2 ( 1999 Pfizer) 8/4/2021   Q1: Little interest or pleasure in doing things 1   Q2: Feeling down, depressed or hopeless 1   PHQ-2 Score 2   Q1: Little interest or pleasure in doing things Several days   Q2: Feeling down, depressed or hopeless Several days   PHQ-2 Score 2       Abuse: Current or Past (Physical, Sexual or Emotional) - No  Do you feel safe in your environment? Yes    Have you ever done Advance Care Planning? (For example, a Health Directive, POLST, or a discussion with a medical provider or your loved ones about your wishes): Yes, patient states has an Advance Care Planning document and will bring a copy to the clinic.    Social History     Tobacco Use     Smoking status: Former Smoker     Types: Cigarettes     Smokeless tobacco: Never Used     Tobacco comment: 1/2 PPD   Substance Use Topics     Alcohol use: Yes     Comment: a couple per day     If you drink alcohol do you typically have >3 drinks per day or >7 drinks per week? No    Alcohol Use 8/4/2021   Prescreen: >3 drinks/day or >7 drinks/week? No   Prescreen: >3 drinks/day or >7 drinks/week? -   No flowsheet data found.    Reviewed orders with patient.  Reviewed health maintenance and updated orders accordingly - Yes       Breast Cancer Screening:  Answers for HPI/ROS submitted by the patient on 8/4/2021  If you checked off any problems, how difficult have these problems  made it for you to do your work, take care of things at home, or get along with other people?: Somewhat difficult  PHQ9 TOTAL SCORE: 6  KATHY 7 TOTAL SCORE: 6      Breast CA Risk Assessment (FHS-7) 8/4/2021 8/4/2021   Do you have a family history of breast, colon, or ovarian cancer? No / Unknown No / Unknown          Pertinent mammograms are reviewed under the imaging tab.    History of abnormal Pap smear: NO - age 30-65 PAP every 5 years with negative HPV co-testing recommended  PAP / HPV Latest Ref Rng & Units 8/4/2021 5/2/2016 12/19/2012   PAP   Negative for Intraepithelial Lesion or Malignancy (NILM) - -   PAP (Historical) - - NIL NIL   HPV16 Negative Negative Negative -   HPV18 Negative Negative Negative -   HRHPV Negative Negative Negative -     Reviewed and updated as needed this visit by clinical staff  Tobacco  Allergies  Meds   Med Hx  Surg Hx  Fam Hx  Soc Hx        Reviewed and updated as needed this visit by Provider                Past Medical History:   Diagnosis Date     Adjustment disorder with mixed anxiety and depressed mood      CARDIOVASCULAR SCREENING; LDL GOAL LESS THAN 100 5/9/2010     CARDIOVASCULAR SCREENING; LDL GOAL LESS THAN 160      CMC arthritis, thumb, degenerative      Contact dermatitis and other eczema, due to unspecified cause     contact and exzema     HTN (hypertension)      Hypercholesteremia      Hypertension goal BP (blood pressure) < 140/90      Iritis 2010     OCD (obsessive compulsive disorder)      Psoriasis      Type II or unspecified type diabetes mellitus without mention of complication, not stated as uncontrolled      Uncomplicated type 2 diabetes mellitus (H) 10/14/2011         Review of Systems  CONSTITUTIONAL: NEGATIVE for fever, chills, change in weight  INTEGUMENTARY/SKIN: NEGATIVE for worrisome rashes, moles or lesions  EYES: NEGATIVE for vision changes or irritation  ENT: NEGATIVE for ear, mouth and throat problems  RESP: NEGATIVE for significant cough or  "SOB  BREAST: NEGATIVE for masses, tenderness or discharge  CV: NEGATIVE for chest pain, palpitations or peripheral edema  GI: NEGATIVE for nausea, abdominal pain, heartburn, or change in bowel habits  : NEGATIVE for unusual urinary or vaginal symptoms. No vaginal bleeding.  MUSCULOSKELETAL: NEGATIVE for significant arthralgias or myalgia  NEURO: NEGATIVE for weakness, dizziness or paresthesias  PSYCHIATRIC: NEGATIVE for changes in mood or affect      OBJECTIVE:   /70 (BP Location: Right arm, Patient Position: Sitting, Cuff Size: Adult Regular)   Pulse 74   Temp 96.9  F (36.1  C) (Temporal)   Ht 1.626 m (5' 4\")   Wt 85.7 kg (189 lb)   LMP 10/19/2010   SpO2 98%   BMI 32.44 kg/m    Physical Exam  GENERAL APPEARANCE: healthy, alert and no distress  EYES: Eyes grossly normal to inspection, PERRL and conjunctivae and sclerae normal  HENT: ear canals and TM's normal, nose and mouth without ulcers or lesions, oropharynx clear and oral mucous membranes moist  NECK: no adenopathy, no asymmetry, masses, or scars and thyroid normal to palpation  RESP: lungs clear to auscultation - no rales, rhonchi or wheezes  BREAST: normal without masses, tenderness or nipple discharge and no palpable axillary masses or adenopathy  CV: regular rate and rhythm, normal S1 S2, no S3 or S4, no murmur, click or rub, no peripheral edema and peripheral pulses strong  ABDOMEN: soft, nontender, no hepatosplenomegaly, no masses and bowel sounds normal   (female): normal female external genitalia, normal urethral meatus, vaginal mucosal atrophy noted, normal cervix   MS: no musculoskeletal defects are noted and gait is age appropriate without ataxia  SKIN: no suspicious lesions or rashes  NEURO: Normal strength and tone, sensory exam grossly normal, mentation intact and speech normal  PSYCH: mentation appears normal and affect normal/bright    Diagnostic Test Results:  Labs reviewed in Epic    ASSESSMENT/PLAN:       ICD-10-CM    1. " Routine general medical examination at a health care facility  Z00.00 Pap imaged thin layer screen with HPV - recommended age 30 - 65 years (select HPV order below)     HPV Hold (Lab Only)     HPV High Risk Types DNA Cervical   2. Hypertension goal BP (blood pressure) < 140/90  I10 Albumin Random Urine Quantitative with Creat Ratio     Basic metabolic panel  (Ca, Cl, CO2, Creat, Gluc, K, Na, BUN)     Albumin Random Urine Quantitative with Creat Ratio     Basic metabolic panel  (Ca, Cl, CO2, Creat, Gluc, K, Na, BUN)   3. Hypercholesteremia  E78.00    4. Obsessive-compulsive disorder, unspecified type  F42.9    5. Psoriasis  L40.9    6. Hypopotassemia  E87.6    7. Screening for colorectal cancer  Z12.11     Z12.12    8. Elevated blood sugar  R73.9 Hemoglobin A1c     Hemoglobin A1c   9. Screening for cervical cancer  Z12.4    10. Screen for colon cancer  Z12.11 COLOGUARD([employer])   11. Screening for hyperlipidemia  Z13.220 Lipid panel reflex to direct LDL Fasting     Lipid panel reflex to direct LDL Fasting     Routine preventive visit   Mammogram is due,   Pap due today --negative pap and neg high risk HPV in 2016.   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 She ran out of lisinopril and dyazide about 3 weeks ago and her blood pressure has remained normal on just the metoprolol. Will stop lisinopril and dyazide for now given good bp control.     Check CMP, urine albumin today          Hypercholesteremia   Risk score 3.4% based on previous lipid screening. Lipids today      If elevated risk score,  she is open to starting statin and would send to her pharmacy.  Had been on the statin in the past.  Denies any intolerance of the  "statin.  She is not sure when she was on it or why it was discontinued.  - Lipid panel reflex to direct LDL Fasting      Elevated blood sugar     - Hemoglobin A1c         OCD  Followed by psychiatry and continues on wellbutrin and sertraline. Has klonopin to use 1mg twice daily       Plaque psoriasis  follows with dermatology        COUNSELING:  Reviewed preventive health counseling, as reflected in patient instructions    Estimated body mass index is 32.44 kg/m  as calculated from the following:    Height as of this encounter: 1.626 m (5' 4\").    Weight as of this encounter: 85.7 kg (189 lb).    Weight management plan: diet and exercise    She reports that she has quit smoking. Her smoking use included cigarettes. She has never used smokeless tobacco.      Counseling Resources:  ATP IV Guidelines  Pooled Cohorts Equation Calculator  Breast Cancer Risk Calculator  BRCA-Related Cancer Risk Assessment: FHS-7 Tool  FRAX Risk Assessment  ICSI Preventive Guidelines  Dietary Guidelines for Americans, 2010  USDA's MyPlate  ASA Prophylaxis  Lung CA Screening    Stacia Rodriguez MD   [facility]  "

## 2021-08-05 LAB
ANION GAP SERPL CALCULATED.3IONS-SCNC: 7 MMOL/L (ref 3–14)
BUN SERPL-MCNC: 11 MG/DL (ref 7–30)
CALCIUM SERPL-MCNC: 8.8 MG/DL (ref 8.5–10.1)
CHLORIDE BLD-SCNC: 112 MMOL/L (ref 94–109)
CHOLEST SERPL-MCNC: 265 MG/DL
CO2 SERPL-SCNC: 23 MMOL/L (ref 20–32)
CREAT SERPL-MCNC: 0.98 MG/DL (ref 0.52–1.04)
CREAT UR-MCNC: 311 MG/DL
FASTING STATUS PATIENT QL REPORTED: YES
GFR SERPL CREATININE-BSD FRML MDRD: 62 ML/MIN/1.73M2
GLUCOSE BLD-MCNC: 112 MG/DL (ref 70–99)
HDLC SERPL-MCNC: 72 MG/DL
LDLC SERPL CALC-MCNC: 154 MG/DL
MICROALBUMIN UR-MCNC: 110 MG/L
MICROALBUMIN/CREAT UR: 35.37 MG/G CR (ref 0–25)
NONHDLC SERPL-MCNC: 193 MG/DL
POTASSIUM BLD-SCNC: 3.6 MMOL/L (ref 3.4–5.3)
SODIUM SERPL-SCNC: 142 MMOL/L (ref 133–144)
TRIGL SERPL-MCNC: 193 MG/DL

## 2021-08-05 ASSESSMENT — ANXIETY QUESTIONNAIRES: GAD7 TOTAL SCORE: 6

## 2021-08-05 ASSESSMENT — PATIENT HEALTH QUESTIONNAIRE - PHQ9: SUM OF ALL RESPONSES TO PHQ QUESTIONS 1-9: 6

## 2021-08-06 LAB
BKR LAB AP GYN ADEQUACY: NORMAL
BKR LAB AP GYN INTERPRETATION: NORMAL
BKR LAB AP HPV REFLEX: NORMAL
BKR LAB AP PREVIOUS ABNORMAL: NORMAL
PATH REPORT.COMMENTS IMP SPEC: NORMAL
PATH REPORT.RELEVANT HX SPEC: NORMAL

## 2021-08-10 LAB
HUMAN PAPILLOMA VIRUS 16 DNA: NEGATIVE
HUMAN PAPILLOMA VIRUS 18 DNA: NEGATIVE
HUMAN PAPILLOMA VIRUS FINAL DIAGNOSIS: NORMAL
HUMAN PAPILLOMA VIRUS OTHER HR: NEGATIVE

## 2021-08-11 PROBLEM — Z12.4 CERVICAL CANCER SCREENING: Status: ACTIVE | Noted: 2021-08-11

## 2021-08-16 NOTE — RESULT ENCOUNTER NOTE
The results of your recent lipid (cholesterol) profile were abnormal.    Here are the results:  Lab Results       Component                Value               Date                       CHOL                     265                 08/04/2021                 CHOL                     231                 05/21/2019            Lab Results       Component                Value               Date                       HDL                      72                  08/04/2021                 HDL                      61                  05/21/2019            Lab Results       Component                Value               Date                       LDL                      154                 08/04/2021                 LDL                      142                 05/21/2019            Lab Results       Component                Value               Date                       TRIG                     193                 08/04/2021                 TRIG                     138                 05/21/2019            Lab Results       Component                Value               Date                       CHOLHDLRATIO             3.3                 05/15/2014              Desired or goal levels are:  CHOLESTEROL: Desirable is less than 200.   HDL (Good Cholesterol): Desirable is greater than 40 (for men) greater than 50 (for women).  LDL (Bad Cholesterol): Desirable is less than 130 (or less than 100 if you have heart disease or diabetes). Borderline 130-160.  TRIGLYCERIDES: Desirable is less than 150.  Borderline is 150-200.    For high triglycerides, reduce the intake of jam, jelly, honey, alcohol, and/or coffee creamers.    As you may know, an elevated cholesterol is one factor that increases your risk for heart disease and stroke. You can improve your cholesterol by controlling the amount and type of fat you eat and by increasing your daily activity level.    Here are some ways to improve your nutrition:  Eat less fat (especially  butter, Crisco and other saturated fats)  Buy lean cuts of meat, reduce your portions of red meat or substitute poultry or fish  Use skim milk and low-fat dairy products  Eat no more than 4 egg yolks per week  Avoid fried or fast foods that are high in fat  Eat more fruits and vegetables      Also consider starting or increasing your aerobic activity. Aerobic activity is the best way to improve HDL (good) cholesterol. If this would be new to you, please talk with me first about what activities are safe for you.      Other lab results     The rest of your lab results were stable.       Please feel free to contact us with any questions or if you would like more information.      Stacia Rodriguez M.D.

## 2021-10-13 ENCOUNTER — TRANSFERRED RECORDS (OUTPATIENT)
Dept: MULTI SPECIALTY CLINIC | Facility: CLINIC | Age: 61
End: 2021-10-13
Payer: COMMERCIAL

## 2021-10-13 LAB — COLOGUARD-ABSTRACT: NEGATIVE

## 2021-10-24 ENCOUNTER — HEALTH MAINTENANCE LETTER (OUTPATIENT)
Age: 61
End: 2021-10-24

## 2021-10-26 ENCOUNTER — VIRTUAL VISIT (OUTPATIENT)
Dept: URGENT CARE | Facility: CLINIC | Age: 61
End: 2021-10-26
Payer: COMMERCIAL

## 2021-10-26 ENCOUNTER — TELEPHONE (OUTPATIENT)
Dept: FAMILY MEDICINE | Facility: CLINIC | Age: 61
End: 2021-10-26

## 2021-10-26 DIAGNOSIS — Z20.822 EXPOSURE TO COVID-19 VIRUS: Primary | ICD-10-CM

## 2021-10-26 PROCEDURE — 99207 PR NO CHARGE LOS: CPT | Performed by: EMERGENCY MEDICINE

## 2021-10-26 NOTE — TELEPHONE ENCOUNTER
Pt calling to state that her phone does not accept unknown numbers and so she missed her virtual UC visit this morning.  Asked to change this setting and reschedule, but pt states she cannot change this setting.  Also states she cannot log in to [facility].      Specifies that she was sent home from work yesterday with symptoms and needs a PCR COVID test.  Pt was given the [facility] website scheduling page to find a rapid (not saliva) testing site near her location.    Sherry Maxwell RN  [facility]

## 2021-10-26 NOTE — PROGRESS NOTES
Unable to reach patient after 35 minutes and multiple calls.    Left message to reschedule if still needing assistance.

## 2021-11-16 ENCOUNTER — TELEPHONE (OUTPATIENT)
Dept: DERMATOLOGY | Facility: CLINIC | Age: 61
End: 2021-11-16
Payer: COMMERCIAL

## 2021-11-23 NOTE — TELEPHONE ENCOUNTER
[facility] Call Center    Phone Message    May a detailed message be left on voicemail: yes     Reason for Call: Form or Letter   Type or form/letter needing completion: Prior Authorization  Provider: Munira Florence PA-C  Date form needed: ASAP  Once completed: Fax form to: [phone]    Savanna states that she needs a prior authorization for the medication adalimumab (HUMIRA *CF* PEN) 40 MG/0.4ML pen kit [[id number]]   When calling back please use reference number [id number]. Thank you    Action Taken: Message routed to:  [facility]erm    Travel Screening: Not Applicable                                                                                                                 
PA Initiation    Medication: adalimumab (HUMIRA *CF* PEN) 40 MG/0.4ML pen kit   Insurance Company: [employer] - Phone [phone] Fax [phone]  Pharmacy Filling the Rx: [facility], [place], [facility] - [address] [place] [address]  Filling Pharmacy Phone: [phone]  Filling Pharmacy Fax: [phone]  Start Date: 11/22/2021      
Prior Authorization Approval    Authorization Effective Date: 10/23/2021  Authorization Expiration Date: 11/22/2022  Medication: adalimumab (HUMIRA *CF* PEN) 40 MG/0.4ML pen kit--APPROVED  Approved Dose/Quantity:   Reference #: [id number]   Insurance Company: [employer] - Phone [phone] Fax [phone]  Expected CoPay:       CoPay Card Available:      Foundation Assistance Needed:    Which Pharmacy is filling the prescription (Not needed for infusion/clinic administered): [address]  Pharmacy Notified: Yes  Patient Notified: Yes **Instructed pharmacy to notify patient when script is ready to /ship.**      
never intubated

## 2021-11-26 ENCOUNTER — TELEPHONE (OUTPATIENT)
Dept: DERMATOLOGY | Facility: CLINIC | Age: 61
End: 2021-11-26
Payer: COMMERCIAL

## 2021-11-26 NOTE — TELEPHONE ENCOUNTER
[facility] Call Center    Phone Message    May a detailed message be left on voicemail: yes     Reason for Call: Form or Letter   Type or form/letter needing completion: Pt called and was wondering if someone in the clinic can sign her form stating that she doesn't need to get the COVID shot. Pt said that it's not safe for her since she takes humira and also because she already had COVID. Please call her back if you have any questions. Pt will be faxing the forms now. Thanks  Provider: Munira Florence  Date form needed: ASAP - Monday by Noon   Once completed: Fax form to: [phone] - Please call the pt before so the pt can keep an eye out for it.     CB: [phone]    Action Taken: Message routed to:  [facility] DERM    Travel Screening: Not Applicable

## 2021-11-29 NOTE — TELEPHONE ENCOUNTER
Munira Florence PA-C  You 6 minutes ago (8:57 AM)     BB    Yeah I would be ok to sign this.     thanks    Message text       You  Munira Florence PA-C; Simona Nelson, [facility] 3 days ago     KD    Is this something you would be okay with signing? States that she is faxing it over    Routing comment

## 2021-11-29 NOTE — TELEPHONE ENCOUNTER
Left a voicemail informing Maggy that Munira will sign this form, but Munira is not in the office until Thursday, 12/3. Will have Munira sign then

## 2021-11-29 NOTE — TELEPHONE ENCOUNTER
Form not received, attempted to leave a voicemail for Maggy but phone continued to ring.  Will try again later.

## 2021-12-03 NOTE — TELEPHONE ENCOUNTER
Form received and filled out by Munira Florence. Faxed back to [phone]    Left a voicemail for Maggy informing her that this has been done.

## 2021-12-14 ENCOUNTER — OFFICE VISIT (OUTPATIENT)
Dept: DERMATOLOGY | Facility: CLINIC | Age: 61
End: 2021-12-14
Payer: COMMERCIAL

## 2021-12-14 DIAGNOSIS — L40.9 PSORIASIS: ICD-10-CM

## 2021-12-14 PROCEDURE — 99214 OFFICE O/P EST MOD 30 MIN: CPT | Performed by: DERMATOLOGY

## 2021-12-14 RX ORDER — ADALIMUMAB 40MG/0.4ML
40 KIT SUBCUTANEOUS
Qty: 6 EACH | Refills: 1 | Status: SHIPPED | OUTPATIENT
Start: 2021-12-14 | End: 2022-11-18

## 2021-12-14 ASSESSMENT — PAIN SCALES - GENERAL: PAINLEVEL: NO PAIN (0)

## 2021-12-14 NOTE — LETTER
2021       RE: Maggy Goldberg  [address]  [facility] [address]     Dear Colleague,    Thank you for referring your patient, Maggy Goldberg, to the [facility]ERMAT[facility] [place] at [facility]. Please see a copy of my visit note below.    [facility] Dermatology Note  Encounter Date: Dec 14, 2021  Office Visit     Dermatology Problem List:   1. Psoriasis  -Current tx: Humira, desonide, clobetasol cream, protopic    2. Mucosal (labial) lentigo - lower lip - biopsy confirmed on 16  -NUB on hard palate deferred to oral pathology for biopsy   3. 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 Continue today with no changes.  - Continue Humira, refilled today  - Continue topicals prn  - Reviewed: Quant gold negative 21    # NUB on left nasal ala in setting of concurrent psoriasis v seborrheic dermatitis   - Ddx normal nasal contour with overlying scale from above inflammatory process, cannot exclude NMSC  - Trial topicals prn  - Notify us for symptoms, growth, change; recheck next visit or sooner if any changes  - Photo monitorin21    Procedures Performed:   None.     Follow-up: 6 month(s) in-person, or earlier for new or changing lesions    Staff and Medical Student:    Celso Juarez, MS3  [facility]    Staff Physician:  I was present with the medical student who participated in the service and in the documentation of the note. I have verified the history and personally performed the physical exam and medical decision making. I agree with the assessment and plan of care as documented in the note.       Samaria Nelson MD    Department of  Dermatology  [facility]: Phone: [phone], Fax: [phone]  12/16/2021    ____________________________________________    CC: Psoriasis (follow up, states that things have been good)    HPI:  Ms. Maggy Goldberg is a(n) 61 year old female who presents today as a return patient for psoriasis. The patient was last seen in dermatology virtually on 6/18/21 by Munira Florence PA-C at which point she was continued on Humira and restarted on clobetasol to elbows and shins, desonide to nasal dorsum, and protopic when not flaring for treatment of psoriasis. She does report that she occasionally notices the start of a possible flare when it is around the time for her next injection. Pt denies any infusion site reaction, nausea, or recent illness.     Pt is doing well and reports her symptoms are being controlled well on the Humira, which she needs refills for today. She has not needed to use any of the topicals. The only lesion she has is on a right shin, which she says is not bothering her and has not applied any topicals to it.     Pt denies any joint stiffness, pain, redness or excessive warmth in her fingers.     Patient is otherwise feeling well, without additional skin concerns.    Labs Reviewed:  Quant-Gold 7/9/21: Negative  CMP 7/9/21: Glucose 136, Creat 1.12. Remainder wnl.     Physical Exam:  Vitals: [facility] 10/19/2010   SKIN: Focused examination of scalp, face, arms and legs was performed.  - There is mild diffuse scaling on the scalp  - on left nasal ala there is a seemingly centrally depressed macule with central scale; no surrounding erythema; non-tender  - There is a small pink/red plaque with fine scale on the right anterior lower leg.  - There is no nail pitting or dystrophy.   - No other lesions of concern on areas examined.     Medications:  Current Outpatient Medications   Medication     adalimumab (HUMIRA *CF* PEN) 40 MG/0.4ML pen kit      adalimumab (HUMIRA *CF*) 80 MG/0.8ML & 40MG/0.4ML pen kit     ASPIRIN 325 MG OR TABS     buPROPion (WELLBUTRIN XL) 150 MG 24 hr tablet     buPROPion (WELLBUTRIN) 100 MG tablet     clobetasol (TEMOVATE) 0.05 % external cream     clonazePAM (KLONOPIN) 1 MG tablet     gabapentin (NEURONTIN) 300 MG capsule     lisinopril (ZESTRIL) 40 MG tablet     metoprolol tartrate (LOPRESSOR) 50 MG tablet     Sertraline HCl (ZOLOFT PO)     tacrolimus (PROTOPIC) 0.1 % external ointment     triamterene-HCTZ (DYAZIDE) 37.5-25 MG capsule     No current facility-administered medications for this visit.      Past Medical History:   Patient Active Problem List   Diagnosis     Adjustment disorder with mixed anxiety and depressed mood     Contact dermatitis and other eczema, due to unspecified cause     Allergic rhinitis     CARDIOVASCULAR SCREENING; LDL GOAL LESS THAN 100     CMC arthritis, thumb, degenerative     OCD (obsessive compulsive disorder)     Hypertension goal BP (blood pressure) < 140/90     Atypical nevi     Psoriasis     Hypercholesteremia     Lentigo     Plaque psoriasis     Lipoma of lower extremity, unspecified laterality     Achrochordon     Dermatofibroma     Skin cancer screening     Cervical segment dysfunction     Cervicalgia     Right shoulder pain, unspecified chronicity     Bicipital tendinitis of right shoulder     Impingement syndrome of right shoulder     Cervical cancer screening     Past Medical History:   Diagnosis Date     Adjustment disorder with mixed anxiety and depressed mood      CARDIOVASCULAR SCREENING; LDL GOAL LESS THAN 100 5/9/2010     CARDIOVASCULAR SCREENING; LDL GOAL LESS THAN 160      CMC arthritis, thumb, degenerative      Contact dermatitis and other eczema, due to unspecified cause     contact and exzema     HTN (hypertension)      Hypercholesteremia      Hypertension goal BP (blood pressure) < 140/90      Iritis 2010     OCD (obsessive compulsive disorder)      Psoriasis      Type II or  unspecified type diabetes mellitus without mention of complication, not stated as uncontrolled      Uncomplicated type 2 diabetes mellitus (H) 10/14/2011        CC Referred Self, MD  No address on file on close of this encounter.

## 2021-12-14 NOTE — PROGRESS NOTES
[facility] Dermatology Note  Encounter Date: Dec 14, 2021  Office Visit     Dermatology Problem List:   1. Psoriasis  -Current tx: Humira, desonide, clobetasol cream, protopic    2. Mucosal (labial) lentigo - lower lip - biopsy confirmed on 16  -NUB on hard palate deferred to oral pathology for biopsy   3. 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 Continue today with no changes.  - Continue Humira, refilled today  - Continue topicals prn  - Reviewed: Quant gold negative 21    # NUB on left nasal ala in setting of concurrent psoriasis v seborrheic dermatitis   - Ddx normal nasal contour with overlying scale from above inflammatory process, cannot exclude NMSC  - Trial topicals prn  - Notify us for symptoms, growth, change; recheck next visit or sooner if any changes  - Photo monitorin21    Procedures Performed:   None.     Follow-up: 6 month(s) in-person, or earlier for new or changing lesions    Staff and Medical Student:    Celso Juarez, MS3  [facility]    Staff Physician:  I was present with the medical student who participated in the service and in the documentation of the note. I have verified the history and personally performed the physical exam and medical decision making. I agree with the assessment and plan of care as documented in the note.       Samaria Nelson MD    Department of Dermatology  [facility]: Phone: [phone], Fax: [phone]  2021    ____________________________________________    CC: Psoriasis (follow up, states that things have been good)    HPI:  Ms. Maggy Goldberg is a(n) 61 year old female who  presents today as a return patient for psoriasis. The patient was last seen in dermatology virtually on 6/18/21 by Munira Florence PA-C at which point she was continued on Humira and restarted on clobetasol to elbows and shins, desonide to nasal dorsum, and protopic when not flaring for treatment of psoriasis. She does report that she occasionally notices the start of a possible flare when it is around the time for her next injection. Pt denies any infusion site reaction, nausea, or recent illness.     Pt is doing well and reports her symptoms are being controlled well on the Humira, which she needs refills for today. She has not needed to use any of the topicals. The only lesion she has is on a right shin, which she says is not bothering her and has not applied any topicals to it.     Pt denies any joint stiffness, pain, redness or excessive warmth in her fingers.     Patient is otherwise feeling well, without additional skin concerns.    Labs Reviewed:  Quant-Gold 7/9/21: Negative  CMP 7/9/21: Glucose 136, Creat 1.12. Remainder wnl.     Physical Exam:  Vitals: LMP 10/19/2010   SKIN: Focused examination of scalp, face, arms and legs was performed.  - There is mild diffuse scaling on the scalp  - on left nasal ala there is a seemingly centrally depressed macule with central scale; no surrounding erythema; non-tender  - There is a small pink/red plaque with fine scale on the right anterior lower leg.  - There is no nail pitting or dystrophy.   - No other lesions of concern on areas examined.     Medications:  Current Outpatient Medications   Medication     adalimumab (HUMIRA *CF* PEN) 40 MG/0.4ML pen kit     adalimumab (HUMIRA *CF*) 80 MG/0.8ML & 40MG/0.4ML pen kit     ASPIRIN 325 MG OR TABS     buPROPion (WELLBUTRIN XL) 150 MG 24 hr tablet     buPROPion (WELLBUTRIN) 100 MG tablet     clobetasol (TEMOVATE) 0.05 % external cream     clonazePAM (KLONOPIN) 1 MG tablet     gabapentin (NEURONTIN) 300 MG capsule      lisinopril (ZESTRIL) 40 MG tablet     metoprolol tartrate (LOPRESSOR) 50 MG tablet     Sertraline HCl (ZOLOFT PO)     tacrolimus (PROTOPIC) 0.1 % external ointment     triamterene-HCTZ (DYAZIDE) 37.5-25 MG capsule     No current facility-administered medications for this visit.      Past Medical History:   Patient Active Problem List   Diagnosis     Adjustment disorder with mixed anxiety and depressed mood     Contact dermatitis and other eczema, due to unspecified cause     Allergic rhinitis     CARDIOVASCULAR SCREENING; LDL GOAL LESS THAN 100     CMC arthritis, thumb, degenerative     OCD (obsessive compulsive disorder)     Hypertension goal BP (blood pressure) < 140/90     Atypical nevi     Psoriasis     Hypercholesteremia     Lentigo     Plaque psoriasis     Lipoma of lower extremity, unspecified laterality     Achrochordon     Dermatofibroma     Skin cancer screening     Cervical segment dysfunction     Cervicalgia     Right shoulder pain, unspecified chronicity     Bicipital tendinitis of right shoulder     Impingement syndrome of right shoulder     Cervical cancer screening     Past Medical History:   Diagnosis Date     Adjustment disorder with mixed anxiety and depressed mood      CARDIOVASCULAR SCREENING; LDL GOAL LESS THAN 100 5/9/2010     CARDIOVASCULAR SCREENING; LDL GOAL LESS THAN 160      CMC arthritis, thumb, degenerative      Contact dermatitis and other eczema, due to unspecified cause     contact and exzema     HTN (hypertension)      Hypercholesteremia      Hypertension goal BP (blood pressure) < 140/90      Iritis 2010     OCD (obsessive compulsive disorder)      Psoriasis      Type II or unspecified type diabetes mellitus without mention of complication, not stated as uncontrolled      Uncomplicated type 2 diabetes mellitus (H) 10/14/2011        CC Referred Self, MD  No address on file on close of this encounter.

## 2021-12-14 NOTE — NURSING NOTE
Dermatology Rooming Note    Maggy Goldberg's goals for this visit include:   Chief Complaint   Patient presents with     Psoriasis     follow up, states that things have been good     Zuleika Contreras CMA on 12/14/2021 at 10:28 AM

## 2021-12-14 NOTE — PATIENT INSTRUCTIONS
Plan to continue humira and topicals as needed.   Follow-up in 6 months, will need repeat quant-gold blood test for tuberculosis at this visit.

## 2022-01-09 DIAGNOSIS — I10 HYPERTENSION GOAL BP (BLOOD PRESSURE) < 140/90: ICD-10-CM

## 2022-01-10 RX ORDER — METOPROLOL TARTRATE 50 MG
TABLET ORAL
Qty: 90 TABLET | Refills: 0 | Status: SHIPPED | OUTPATIENT
Start: 2022-01-10 | End: 2022-04-18

## 2022-01-10 NOTE — TELEPHONE ENCOUNTER
Beta-Blockers Protocol Passed 01/09/2022 01:49 PM   Protocol Details  Blood pressure under 140/90 in past 12 months    Patient is age 6 or older    Recent (12 mo) or future (30 days) visit within the authorizing provider's specialty    Medication is active on med list

## 2022-04-17 DIAGNOSIS — I10 HYPERTENSION GOAL BP (BLOOD PRESSURE) < 140/90: ICD-10-CM

## 2022-04-17 DIAGNOSIS — E87.6 HYPOPOTASSEMIA: ICD-10-CM

## 2022-04-18 DIAGNOSIS — I10 HYPERTENSION GOAL BP (BLOOD PRESSURE) < 140/90: ICD-10-CM

## 2022-04-18 RX ORDER — LISINOPRIL 40 MG/1
TABLET ORAL
Qty: 90 TABLET | Refills: 0 | Status: SHIPPED | OUTPATIENT
Start: 2022-04-18 | End: 2022-05-23

## 2022-04-18 RX ORDER — METOPROLOL TARTRATE 50 MG
TABLET ORAL
Qty: 90 TABLET | Refills: 0 | Status: SHIPPED | OUTPATIENT
Start: 2022-04-18 | End: 2022-05-23

## 2022-04-18 RX ORDER — TRIAMTERENE AND HYDROCHLOROTHIAZIDE 37.5; 25 MG/1; MG/1
1 CAPSULE ORAL DAILY
Qty: 90 CAPSULE | Refills: 0 | Status: SHIPPED | OUTPATIENT
Start: 2022-04-18 | End: 2022-05-23

## 2022-04-18 NOTE — TELEPHONE ENCOUNTER
Diuretics (Including Combos) Protocol Passed 04/17/2022 10:16 AM   Protocol Details  Blood pressure under 140/90 in past 12 months    Recent (12 mo) or future (30 days) visit within the authorizing provider's specialty    Medication is active on med list    Patient is age 18 or older    No active pregancy on record    Normal serum creatinine on file in past 12 months    Normal serum potassium on file in past 12 months    Normal serum sodium on file in past 12 months    No positive pregnancy test in past 12 months     Beta-Blockers Protocol Passed 04/17/2022 10:16 AM   Protocol Details  Blood pressure under 140/90 in past 12 months    Patient is age 6 or older    Recent (12 mo) or future (30 days) visit within the authorizing provider's specialty    Medication is active on med list

## 2022-04-18 NOTE — TELEPHONE ENCOUNTER
ACE Inhibitors (Including Combos) Protocol Passed 04/18/2022 09:22 AM   Protocol Details  Blood pressure under 140/90 in past 12 months    Recent (12 mo) or future (30 days) visit within the authorizing provider's specialty    Medication is active on med list    Patient is age 18 or older    No active pregnancy on record    Normal serum creatinine on file in past 12 months    Normal serum potassium on file in past 12 months    No positive pregnancy test within past 12 months

## 2022-05-23 ENCOUNTER — OFFICE VISIT (OUTPATIENT)
Dept: FAMILY MEDICINE | Facility: CLINIC | Age: 62
End: 2022-05-23
Payer: COMMERCIAL

## 2022-05-23 VITALS
BODY MASS INDEX: 31.76 KG/M2 | WEIGHT: 186 LBS | RESPIRATION RATE: 16 BRPM | DIASTOLIC BLOOD PRESSURE: 86 MMHG | HEART RATE: 54 BPM | SYSTOLIC BLOOD PRESSURE: 110 MMHG | OXYGEN SATURATION: 98 % | HEIGHT: 64 IN | TEMPERATURE: 97.9 F

## 2022-05-23 DIAGNOSIS — Z13.220 LIPID SCREENING: ICD-10-CM

## 2022-05-23 DIAGNOSIS — I10 HYPERTENSION GOAL BP (BLOOD PRESSURE) < 140/90: Primary | ICD-10-CM

## 2022-05-23 DIAGNOSIS — E87.6 HYPOPOTASSEMIA: ICD-10-CM

## 2022-05-23 DIAGNOSIS — I49.1 PREMATURE ATRIAL CONTRACTIONS: ICD-10-CM

## 2022-05-23 PROCEDURE — 99214 OFFICE O/P EST MOD 30 MIN: CPT | Performed by: FAMILY MEDICINE

## 2022-05-23 PROCEDURE — 93000 ELECTROCARDIOGRAM COMPLETE: CPT | Performed by: FAMILY MEDICINE

## 2022-05-23 RX ORDER — LISINOPRIL 40 MG/1
40 TABLET ORAL DAILY
Qty: 90 TABLET | Refills: 3 | Status: SHIPPED | OUTPATIENT
Start: 2022-05-23 | End: 2023-07-17

## 2022-05-23 RX ORDER — METOPROLOL TARTRATE 50 MG
50 TABLET ORAL DAILY
Qty: 90 TABLET | Refills: 3 | Status: SHIPPED | OUTPATIENT
Start: 2022-05-23 | End: 2023-07-24

## 2022-05-23 RX ORDER — TRIAMTERENE AND HYDROCHLOROTHIAZIDE 37.5; 25 MG/1; MG/1
1 CAPSULE ORAL DAILY
Qty: 90 CAPSULE | Refills: 3 | Status: SHIPPED | OUTPATIENT
Start: 2022-05-23 | End: 2023-07-17

## 2022-05-23 ASSESSMENT — ANXIETY QUESTIONNAIRES
GAD7 TOTAL SCORE: 4
GAD7 TOTAL SCORE: 4
2. NOT BEING ABLE TO STOP OR CONTROL WORRYING: NOT AT ALL
5. BEING SO RESTLESS THAT IT IS HARD TO SIT STILL: SEVERAL DAYS
7. FEELING AFRAID AS IF SOMETHING AWFUL MIGHT HAPPEN: NOT AT ALL
6. BECOMING EASILY ANNOYED OR IRRITABLE: SEVERAL DAYS
1. FEELING NERVOUS, ANXIOUS, OR ON EDGE: MORE THAN HALF THE DAYS
GAD7 TOTAL SCORE: 4
3. WORRYING TOO MUCH ABOUT DIFFERENT THINGS: NOT AT ALL
8. IF YOU CHECKED OFF ANY PROBLEMS, HOW DIFFICULT HAVE THESE MADE IT FOR YOU TO DO YOUR WORK, TAKE CARE OF THINGS AT HOME, OR GET ALONG WITH OTHER PEOPLE?: SOMEWHAT DIFFICULT
4. TROUBLE RELAXING: NOT AT ALL
7. FEELING AFRAID AS IF SOMETHING AWFUL MIGHT HAPPEN: NOT AT ALL

## 2022-05-23 ASSESSMENT — PATIENT HEALTH QUESTIONNAIRE - PHQ9
SUM OF ALL RESPONSES TO PHQ QUESTIONS 1-9: 7
10. IF YOU CHECKED OFF ANY PROBLEMS, HOW DIFFICULT HAVE THESE PROBLEMS MADE IT FOR YOU TO DO YOUR WORK, TAKE CARE OF THINGS AT HOME, OR GET ALONG WITH OTHER PEOPLE: SOMEWHAT DIFFICULT
SUM OF ALL RESPONSES TO PHQ QUESTIONS 1-9: 7

## 2022-05-23 NOTE — PROGRESS NOTES
"  Assessment & Plan     Hypertension goal BP (blood pressure) < 140/90  Controlled. No changes today.  Continues lisinopril 40 mg daily, metoprolol 50 mg daily, triamterene-hydrochlorothiazide 37.5-25 mg daily.  Due for labs in August.  She will schedule.  - lisinopril (ZESTRIL) 40 MG tablet  Dispense: 90 tablet; Refill: 3  - metoprolol tartrate (LOPRESSOR) 50 MG tablet  Dispense: 90 tablet; Refill: 3  - triamterene-HCTZ (DYAZIDE) 37.5-25 MG capsule  Dispense: 90 capsule; Refill: 3  - Basic metabolic panel  (Ca, Cl, CO2, Creat, Gluc, K, Na, BUN)  - Albumin Random Urine Quantitative with Creat Ratio  - EKG 12-lead complete w/read - Clinics    Hypopotassemia  Stable on Dyazide  - triamterene-HCTZ (DYAZIDE) 37.5-25 MG capsule  Dispense: 90 capsule; Refill: 3    Lipid screening    - Lipid panel reflex to direct LDL Fasting    Premature atrial contractions  Asymptomatic, frequent.  Because of the frequency of her PACs, I recommended discussion with cardiology regarding possible Zio patch to assess PAC burden and any additional recommendations.  Discussed stress can be a trigger for increased episodes.  She identifies this past month or so as higher stress time as her mother has been in and out of the hospital and has been needing more help.  She will now be transitioning to assisted living, which will relieve some of the stress.  - Adult Cardiology Eval  Referral      She has not received the COVID-vaccine due to distrust of the government, she reports.        BMI:   Estimated body mass index is 31.93 kg/m  as calculated from the following:    Height as of this encounter: 1.626 m (5' 4\").    Weight as of this encounter: 84.4 kg (186 lb).       Patient Instructions   Schedule with cardiology to discuss frequent premature atrial contractions.       No follow-ups on file.    Stacia Rodriguez MD   [facility]    Nikole Winter is a 62 year old who presents for the following health " "issues      History of Present Illness       Reason for visit:  Irregular puls  Symptoms include:  None  Symptom intensity:  Mild  Symptom progression:  Staying the same  Had these symptoms before:  No  What makes it worse:  No  What makes it better:  No    She eats 2-3 servings of fruits and vegetables daily.She consumes 0 sweetened beverage(s) daily.She exercises with enough effort to increase her heart rate 20 to 29 minutes per day.  She exercises with enough effort to increase her heart rate 4 days per week.   She is taking medications regularly.    Today's PHQ-9         PHQ-9 Total Score: 7    PHQ-9 Q9 Thoughts of better off dead/self-harm past 2 weeks :   Not at all    How difficult have these problems made it for you to do your work, take care of things at home, or get along with other people: Somewhat difficult  Today's KATHY-7 Score: 4       She donates blood regularly and this last time that she went to donate blood I told her she had an irregular pulse with 12 episodes in a minute.  She was not allowed to donate blood.  She denies any symptoms.  Denies lightheadedness, chest pain, shortness of breath.  She does not feel palpitations.  She is otherwise doing well.  She does note that she has been under more stress in the last couple of months with her mom in and out of the hospital with chronic back pain and Lewy body dementia.  Her mom will be transitioning into an assisted living facility soon.  Review of Systems          Objective    /86 (BP Location: Left arm, Patient Position: Sitting, Cuff Size: Adult Regular)   Pulse 54   Temp 97.9  F (36.6  C) (Temporal)   Resp 16   Ht 1.626 m (5' 4\")   Wt 84.4 kg (186 lb)   LMP 10/19/2010   SpO2 98%   BMI 31.93 kg/m    Body mass index is 31.93 kg/m .  Physical Exam   Gen: alert, nad    EKG shows sinus rhythm with frequent PACs      The 10-year ASCVD risk score (Silvinogary KOHLER Jr., et al., 2013) is: 4.7%    Values used to calculate the score:      Age: 62 " years      Sex: Female      Is Non- : No      Diabetic: No      Tobacco smoker: No      Systolic Blood Pressure: 110 mmHg      Is BP treated: Yes      HDL Cholesterol: 72 mg/dL      Total Cholesterol: 265 mg/dL     Office Visit on 08/04/2021   Component Date Value Ref Range Status     Interpretation 08/04/2021 Negative for Intraepithelial Lesion or Malignancy (NILM)    Final     Specimen Adequacy 08/04/2021 Satisfactory for evaluation, endocerv/transformation zone component absent, atrophy   Final     Clinical Information 08/04/2021    Final                    Value:This result contains rich text formatting which cannot be displayed here.     Reflex Testing 08/04/2021 Yes regardless of result   Final     Previous Abnormal? 08/04/2021    Final                    Value:This result contains rich text formatting which cannot be displayed here.     Performing Labs 08/04/2021    Final                    Value:This result contains rich text formatting which cannot be displayed here.     Creatinine Urine mg/dL 08/04/2021 311  mg/dL Final     Albumin Urine mg/L 08/04/2021 110  mg/L Final     Albumin Urine mg/g Cr 08/04/2021 35.37 (A) 0.00 - 25.00 mg/g Cr Final     Sodium 08/04/2021 142  133 - 144 mmol/L Final     Potassium 08/04/2021 3.6  3.4 - 5.3 mmol/L Final     Chloride 08/04/2021 112 (A) 94 - 109 mmol/L Final     Carbon Dioxide (CO2) 08/04/2021 23  20 - 32 mmol/L Final     Anion Gap 08/04/2021 7  3 - 14 mmol/L Final     Urea Nitrogen 08/04/2021 11  7 - 30 mg/dL Final     Creatinine 08/04/2021 0.98  0.52 - 1.04 mg/dL Final     Calcium 08/04/2021 8.8  8.5 - 10.1 mg/dL Final     Glucose 08/04/2021 112 (A) 70 - 99 mg/dL Final     GFR Estimate 08/04/2021 62  >60 mL/min/1.73m2 Final    As of July 11, 2021, eGFR is calculated by the CKD-EPI creatinine equation, without race adjustment. eGFR can be influenced by muscle mass, exercise, and diet. The reported eGFR is an estimation only and is only  applicable if the renal function is stable.     Hemoglobin A1C 08/04/2021 5.1  0.0 - 5.6 % Final    Normal <5.7%   Prediabetes 5.7-6.4%    Diabetes 6.5% or higher     Note: Adopted from ADA consensus guidelines.     Cholesterol 08/04/2021 265 (A) <200 mg/dL Final    Age 0-19 years  Desirable: <170 mg/dL  Borderline high:  170-199 mg/dl  High:            >199 mg/dl    Age 20 years and older  Desirable: <200 mg/dL     Triglycerides 08/04/2021 193 (A) <150 mg/dL Final    0-9 years:  Normal:    Less than 75 mg/dL  Borderline high:  75-99 mg/dL  High:             Greater than or equal to 100 mg/dL    0-19 years:  Normal:    Less than 90 mg/dL  Borderline high:   mg/dL  High:             Greater than or equal to 130 mg/dL    20 years and older:  Normal:    Less than 150 mg/dL  Borderline high:  150-199 mg/dL  High:             200-499 mg/dL  Very high:   Greater than or equal to 500 mg/dL     Direct Measure HDL 08/04/2021 72  >=50 mg/dL Final    0-19 years:       Greater than or equal to 45 mg/dL   Low: Less than 40 mg/dL   Borderline low: 40-44 mg/dL     20 years and older:   Female: Greater than or equal to 50 mg/dL   Male:   Greater than or equal to 40 mg/dL          LDL Cholesterol Calculated 08/04/2021 154 (A) <=100 mg/dL Final    Age 0-19 years:  Desirable: 0-110 mg/dL   Borderline high: 110-129 mg/dL   High: >= 130 mg/dL    Age 20 years and older:  Desirable: <100mg/dL  Above desirable: 100-129 mg/dL   Borderline high: 130-159 mg/dL   High: 160-189 mg/dL   Very high: >= 190 mg/dL     Non HDL Cholesterol 08/04/2021 193 (A) <130 mg/dL Final    0-19 years:  Desirable:          Less than 120 mg/dL  Borderline high:   120-144 mg/dL  High:                   Greater than or equal to 145 mg/dL    20 years and older:  Desirable:          130 mg/dL  Above Desirable: 130-159 mg/dL  Borderline high:   160-189 mg/dL  High:               190-219 mg/dL  Very high:     Greater than or equal to 220 mg/dL     Patient Fasting >  8hrs? 08/04/2021 Yes   Final     Other HR HPV 08/04/2021 Negative  Negative Final     HPV16 DNA 08/04/2021 Negative  Negative Final     HPV18 DNA 08/04/2021 Negative  Negative Final     FINAL DIAGNOSIS 08/04/2021    Final                    Value:This result contains rich text formatting which cannot be displayed here.

## 2022-05-23 NOTE — LETTER
[facility] [address] [place] [address]  Phone: [phone]    May 23, 2022        Maggy Goldberg  [address] [facility] [address]          To whom it may concern:    RE: Maggy Winter was seen and treated today at our clinic. She has frequent premature atrial contractions. It is okay for her to continue to donate blood.     Please contact me for questions or concerns.      Sincerely,        Stacia Rodriguez MD

## 2022-05-25 NOTE — TELEPHONE ENCOUNTER
RECORDS RECEIVED FROM:   DATE RECEIVED:   NOTES STATUS DETAILS   OFFICE NOTE from referring provider    Internal Dr. Rodriguez 5-23-22   OFFICE NOTE from other cardiologist    N/A    DISCHARGE SUMMARY from hospital    N/A    DISCHARGE REPORT from the ER   N/A    OPERATIVE REPORT    N/A    MEDICATION LIST   Internal    LABS     BMP   Internal 8-4-21   CBC   Internal 2-5-20   CMP   Internal 7-9-21   Lipids   Internal 8-4-21   TSH   N/A    DIAGNOSTIC PROCEDURES     EKG   Internal 5-23-22   Monitor Reports   N/A    IMAGING (DISC & REPORT)      Echo   N/A    Stress Tests   N/A    Cath   N/A    MRI/MRA   N/A    CT/CTA   N/A

## 2022-05-25 NOTE — PROGRESS NOTES
"[facility] Electrophysiology Clinic: New Referral  05/26/2022      HISTORY OF PRESENTING ILLNESS:  Maggy Goldberg is a 62 year old female who presents to clinic today to establish care for evaluation and management of incidentally noted premature atrial contractions.    The patient has a past medical history of hypertension, hyperlipidemia, psoriasis on Humira, anxiety and OCD. The patient presents after a referral from her PCP for incidentally noted PAC's in a trigeminy pattern on a routine EKG.     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 She works 10:30 am to 7pm- predominantly desk work, & relaxes in the evenings. She's active over the weekends, cares for her 3 year old grand-daughter, able to walk miles at a time, can climb multiple flights of stairs without developing symptoms. On cardiac ROS, she denied chest tightness, SOB, palpitations, presyncope or syncope. Denied PND, orthopnea, pedal edema.     Home medications:   Lisinopril 40 mg daily  Metoprolol tartrate 50 mg daily  Triamterene-HCTZ 37.5-25 mg daily   mg daily (for jaw pain & anti-inflammatory effect)  Adalimumab (Humira) every other week  Wellbutrin  Clonazepam    Past Medical History:  Hypertension  Hyperlipidemia  Psoriasis on Humira  Anxiety and OCD    Social History:  Lives at home with her 29 year old son & 3 year old grand-duaghter  Non-smoker, no drug use  Drinks vodka every night (1 glass), sometimes more over the weekends    ROS:  A complete 10-point ROS was negative except as above.    PHYSICAL EXAM:  /84 (BP Location: Right arm, Patient Position: Chair, Cuff Size: Adult Large)   Pulse (!) 49   Ht 1.6 m (5' 2.99\")   Wt 83.4 kg (183 lb 12.8 oz)   LMP 10/19/2010   SpO2 98%   BMI 32.57 kg/m      Gen: alert, interactive, NAD  HEENT: atraumatic, EOMI, MMM  Neck: supple, no JVP elevation appreciated.  CV: RRR, no murmurs, rubs.  Chest: CTAB, no wheezes or " crackles  Abd: soft, NT, ND, +BS  Ext: no LE edema, 2+ peripheral pulses  Skin: warm and dry, no rashes on exposed surfaces  Neuro: alert and oriented, no focal deficits    LABS:  CMP 8/4/2021 Na 142 K 3.6 AST/ALT 30's Creatinine 0.98  CBC 2/5/2020 WBC 4.5 HGB 15 PLT 166k  LIPIDS 8/4/2021: TChol 265    TSH 2014: 1.92  HBA1C 8/4/2021: 5.1%    Cardiac Data:   EKG: NSR, PAC's in a trigeminy pattern  No TTE's to review.    Assessment/Plan:  Maggy Goldberg is a 62 year old female with past medical history of hypertension, hyperlipidemia, psoriasis on Humira, anxiety and OCD who presents to clinic today to establish care for evaluation and management of incidentally noted premature atrial contractions in a trigeminy pattern on a routine EKG.     # Asymptomatic PAC's  # HTN, HLD    Premature atrial contractions - asymptomatic; no increased risk of cardiomyopathy, sudden cardiac death or life threatening arrhythmias. Risk of developing Afib discussed with the patient- recommend risk factor modification- ADELAIDA screening/CPAP use and hypertension management.     1. No further cardiac work up required.   2. Follow up with primary care physician for risk factor modification.   3. PRN Cardiology follow up.     Pt seen and discussed with Dr. Stoner.    Юлия Blevins MD,   Cardiovascular Disease Fellow  Pager [phone]

## 2022-05-26 ENCOUNTER — OFFICE VISIT (OUTPATIENT)
Dept: CARDIOLOGY | Facility: CLINIC | Age: 62
End: 2022-05-26
Attending: FAMILY MEDICINE
Payer: COMMERCIAL

## 2022-05-26 ENCOUNTER — PRE VISIT (OUTPATIENT)
Dept: CARDIOLOGY | Facility: CLINIC | Age: 62
End: 2022-05-26
Payer: COMMERCIAL

## 2022-05-26 VITALS
OXYGEN SATURATION: 98 % | DIASTOLIC BLOOD PRESSURE: 84 MMHG | HEIGHT: 63 IN | SYSTOLIC BLOOD PRESSURE: 134 MMHG | HEART RATE: 49 BPM | BODY MASS INDEX: 32.57 KG/M2 | WEIGHT: 183.8 LBS

## 2022-05-26 DIAGNOSIS — I49.1 PREMATURE ATRIAL CONTRACTIONS: ICD-10-CM

## 2022-05-26 DIAGNOSIS — I10 BENIGN ESSENTIAL HYPERTENSION: Primary | ICD-10-CM

## 2022-05-26 PROCEDURE — 99205 OFFICE O/P NEW HI 60 MIN: CPT | Mod: GC | Performed by: INTERNAL MEDICINE

## 2022-05-26 PROCEDURE — G0463 HOSPITAL OUTPT CLINIC VISIT: HCPCS

## 2022-05-26 ASSESSMENT — PAIN SCALES - GENERAL: PAINLEVEL: NO PAIN (0)

## 2022-05-26 NOTE — NURSING NOTE
Chief Complaint   Patient presents with     New Patient     NEW EP referral d/t PAC's      Vitals were taken and medications reconciled.    Norm Bolivar, EMT  10:38 AM

## 2022-05-26 NOTE — NURSING NOTE
No changes made at this appt. Follow up with Dr. Stoner as needed.    Vasquez Luna, RN   Cardiology Nurse Coordinator

## 2022-05-26 NOTE — LETTER
5/26/2022      RE: Maggy Goldberg  [address]  [facility] [address]       Dear Colleague,    Thank you for the opportunity to participate in the care of your patient, Maggy Goldberg, at the [facility] [place] [facility] [place]. Please see a copy of my visit note below.    [facility] Electrophysiology Clinic: New Referral  05/26/2022      HISTORY OF PRESENTING ILLNESS:  Maggy Goldberg is a 62 year old female who presents to clinic today to establish care for evaluation and management of incidentally noted premature atrial contractions.    The patient has a past medical history of hypertension, hyperlipidemia, psoriasis on Humira, anxiety and OCD. The patient presents after a referral from her PCP for incidentally noted PAC's in a trigeminy pattern on a routine EKG.     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 She works 10:30 am to 7pm- predominantly desk work, & relaxes in the evenings. She's active over the weekends, cares for her 3 year old grand-daughter, able to walk miles at a time, can climb multiple flights of stairs without developing symptoms. On cardiac ROS, she denied chest tightness, SOB, palpitations, presyncope or syncope. Denied PND, orthopnea, pedal edema.     Home medications:   Lisinopril 40 mg daily  Metoprolol tartrate 50 mg daily  Triamterene-HCTZ 37.5-25 mg daily   mg daily (for jaw pain & anti-inflammatory effect)  Adalimumab (Humira) every other week  Wellbutrin  Clonazepam    Past Medical History:  Hypertension  Hyperlipidemia  Psoriasis on Humira  Anxiety and OCD    Social History:  Lives at home with her 29 year old son & 3 year old grand-dumichelle  Non-smoker, no drug use  Drinks vodka every night (1 glass), sometimes more over the weekends    ROS:  A complete 10-point ROS was negative except as above.    PHYSICAL EXAM:  /84 (BP Location: Right  "arm, Patient Position: Chair, Cuff Size: Adult Large)   Pulse (!) 49   Ht 1.6 m (5' 2.99\")   Wt 83.4 kg (183 lb 12.8 oz)   LMP 10/19/2010   SpO2 98%   BMI 32.57 kg/m      Gen: alert, interactive, NAD  HEENT: atraumatic, EOMI, MMM  Neck: supple, no JVP elevation appreciated.  CV: RRR, no murmurs, rubs.  Chest: CTAB, no wheezes or crackles  Abd: soft, NT, ND, +BS  Ext: no LE edema, 2+ peripheral pulses  Skin: warm and dry, no rashes on exposed surfaces  Neuro: alert and oriented, no focal deficits    LABS:  CMP 8/4/2021 Na 142 K 3.6 AST/ALT 30's Creatinine 0.98  CBC 2/5/2020 WBC 4.5 HGB 15 PLT 166k  LIPIDS 8/4/2021: TChol 265    TSH 2014: 1.92  HBA1C 8/4/2021: 5.1%    Cardiac Data:   EKG: NSR, PAC's in a trigeminy pattern  No TTE's to review.    Assessment/Plan:  Maggy Goldberg is a 62 year old female with past medical history of hypertension, hyperlipidemia, psoriasis on Humira, anxiety and OCD who presents to clinic today to establish care for evaluation and management of incidentally noted premature atrial contractions in a trigeminy pattern on a routine EKG.     # Asymptomatic PAC's  # HTN, HLD    Premature atrial contractions - asymptomatic; no increased risk of cardiomyopathy, sudden cardiac death or life threatening arrhythmias. Risk of developing Afib discussed with the patient- recommend risk factor modification- ADELAIDA screening/CPAP use and hypertension management.     1. No further cardiac work up required.   2. Follow up with primary care physician for risk factor modification.   3. PRN Cardiology follow up.     Pt seen and discussed with Dr. Stoner.    Юлия Blevins MD,   Cardiovascular Disease Fellow  Pager [phone]    Attestation signed by Nelly Stoner MD at 5/26/2022 11:57 AM:  Patient seen and examined with Dr. Blevins. The history and physical findings are accurate as recorded.   Briefly, asymptomatic PACs, detected when an irregular pulse was noted prior to blood donation.     All " labs, imaging studies, ECG and telemetry data have been reviewed personally. Specifically,   EKG: sinus with PACs in trigeminal pattern.    The assessment and plans outlined reflect our joint decision making.  Asymptomatic PACs, may predispose to AF in future. Discussed risk factor modification.  No specific treatment required at this time.      Dr. Blevins and I spent a total of 30 minutes face to face with  Maggy Goldberg during today's office visit. I have spend an additional 30 minutes today on chart review and documentation.    Nelly Stoner MD  Cardiology

## 2022-06-04 ENCOUNTER — OFFICE VISIT (OUTPATIENT)
Dept: URGENT CARE | Facility: URGENT CARE | Age: 62
End: 2022-06-04
Payer: COMMERCIAL

## 2022-06-04 VITALS
OXYGEN SATURATION: 99 % | DIASTOLIC BLOOD PRESSURE: 83 MMHG | RESPIRATION RATE: 16 BRPM | TEMPERATURE: 97.3 F | SYSTOLIC BLOOD PRESSURE: 130 MMHG | HEART RATE: 64 BPM

## 2022-06-04 DIAGNOSIS — M25.511 RIGHT SHOULDER PAIN, UNSPECIFIED CHRONICITY: Primary | ICD-10-CM

## 2022-06-04 PROCEDURE — 99213 OFFICE O/P EST LOW 20 MIN: CPT | Performed by: STUDENT IN AN ORGANIZED HEALTH CARE EDUCATION/TRAINING PROGRAM

## 2022-06-04 RX ORDER — PIROXICAM 10 MG/1
10 CAPSULE ORAL 2 TIMES DAILY PRN
Qty: 20 CAPSULE | Refills: 0 | Status: SHIPPED | OUTPATIENT
Start: 2022-06-04 | End: 2022-06-16

## 2022-06-08 ENCOUNTER — TELEPHONE (OUTPATIENT)
Dept: FAMILY MEDICINE | Facility: CLINIC | Age: 62
End: 2022-06-08
Payer: COMMERCIAL

## 2022-06-08 DIAGNOSIS — M25.511 RIGHT SHOULDER PAIN, UNSPECIFIED CHRONICITY: Primary | ICD-10-CM

## 2022-06-08 NOTE — TELEPHONE ENCOUNTER
Dr. Rodriguez-Please review and sign if agree:  1. Patient evaluated in [facility] on 6/4/22 for right shoulder pain after fall    Thank you!  LORRAINE ShawN, RN  [facility]

## 2022-06-08 NOTE — TELEPHONE ENCOUNTER
Reason for Call: Other    Detailed comments: Patient requesting referral to ortho for shoulder pain from a fall / injury. Patient was seen in  for this as well. Please assist. Thanks!    Phone Number Patient can be reached at: Cell number on file:    Telephone Information:   Mobile [phone]     Best Time: Any    Can we leave a detailed message on this number? YES    Call taken on 6/8/2022 at 12:40 PM by Jannie Fowler

## 2022-06-09 ENCOUNTER — TRANSFERRED RECORDS (OUTPATIENT)
Dept: HEALTH INFORMATION MANAGEMENT | Facility: CLINIC | Age: 62
End: 2022-06-09
Payer: COMMERCIAL

## 2022-06-09 NOTE — TELEPHONE ENCOUNTER
Spoke with Pt and informed her [facility] is in the [facility] network.    ARTURO Connolly [facility] Rep

## 2022-06-14 DIAGNOSIS — M25.511 RIGHT SHOULDER PAIN, UNSPECIFIED CHRONICITY: ICD-10-CM

## 2022-06-14 NOTE — TELEPHONE ENCOUNTER
Reason for Call:  Medication or medication refill:    Do you use a [facility]?  Name of the pharmacy and phone number for the current request:  [id number] [place] [id number] AT [address]    Name of the medication requested: piroxicam (FELDENE) 10 MG capsule    Other request: Patient was seen in UC and prescribed this medication. She is out now and hoping to have it refilled as she still has SX and says the medication was working great. Please assist / advise. Thanks!    Can we leave a detailed message on this number? YES    Phone number patient can be reached at: Cell number on file:    Telephone Information:   Mobile [phone]     Best Time: Any    Call taken on 6/14/2022 at 10:08 AM by Jannie Fowler

## 2022-06-16 ENCOUNTER — NURSE TRIAGE (OUTPATIENT)
Dept: NURSING | Facility: CLINIC | Age: 62
End: 2022-06-16
Payer: COMMERCIAL

## 2022-06-16 RX ORDER — PIROXICAM 10 MG/1
10 CAPSULE ORAL DAILY PRN
Qty: 20 CAPSULE | Refills: 0 | Status: SHIPPED | OUTPATIENT
Start: 2022-06-16 | End: 2023-03-07

## 2022-06-16 NOTE — TELEPHONE ENCOUNTER
Please let Faiza know that I refilled the piroxicam.  She needs to come in for labs including blood counts to monitor this medication.  Use very sparingly once daily.  It carries a risk of GI bleeding and that risk of GI bleeding is even higher when taking sertraline.    Stacia Rodriguez M.D.

## 2022-06-16 NOTE — TELEPHONE ENCOUNTER
"Patient is calling back as someone phoned her.  FNA relayed message from Stacia Rodriguez MD.  Patient is currently have pain and is going to have shoulder surgery on July 7, 2022.  Patient has a preop scheduled with Stacia Rodriguez on 06/20/2022 and can give labs then.  Pain is currently in right shoulder.  Pain is 0 when not moving with movement a \"9\".  Patient states that she is ready for surgery. Denies chest pain.  Patient is taking pain medication.  Patient denies fever cough and shortness of breath.  Patient states that she will give her labs on her preop visit.  Patient does not wish to go in for clinic appointment.  Patient states that it is hard to get dressed and is also hard to drive.  Denies swelling and denies red area or streak and fever.      COVID 19 Nurse Triage Plan/Patient Instructions    Please be aware that novel coronavirus (COVID-19) may be circulating in the community. If you develop symptoms such as fever, cough, or SOB or if you have concerns about the presence of another infection including coronavirus (COVID-19), please contact your health care provider or visit [email or web address].     Disposition/Instructions    In-Person Visit with provider recommended. Reference Visit Selection Guide.    Thank you for taking steps to prevent the spread of this virus.  o Limit your contact with others.  o Wear a simple mask to cover your cough.  o Wash your hands well and often.    Resources    [facility]: About COVID-19: [email or web address]."[email or web address]    CDC: What to Do If You're Sick: www.cdc.gov/coronavirus/2019-ncov/about/steps-when-sick.html    CDC: Ending Home Isolation: www.cdc.gov/coronavirus/2019-ncov/hcp/disposition-in-home-patients.html     CDC: Caring for Someone: www.cdc.gov/coronavirus/2019-ncov/if-you-are-sick/care-for-someone.html     [facility]: Interim Guidance for Hospital Discharge to Home: www.health.[facility].[place]/diseases/coronavirus/hcp/hospdischarge.pdf    [facility]" [place] clinical trials (COVID-19 research studies): clinicalaffairs.[facility]-clinical-trials     Below are the COVID-19 hotlines at the [facility] ([facility]). Interpreters are available.   o For health questions: Call 066-452-7139 or 1-196.724.4787 (7 a.m. to 7 p.m.)  o For questions about schools and childcare: Call 065-082-1914 or 1-470.756.8264 (7 a.m. to 7 p.m.)                        Reason for Disposition    SEVERE pain (e.g., excruciating, unable to do any normal activities)    Additional Information    Negative: Shock suspected (e.g., cold/pale/clammy skin, too weak to stand, low BP, rapid pulse)    Negative: Similar pain previously and it was from 'heart attack'    Negative: Similar pain previously and it was from 'angina' and not relieved by nitroglycerin    Negative: Sounds like a life-threatening emergency to the triager    Negative: Difficulty breathing or unusual sweating (e.g., sweating without exertion)    Negative: Pain lasting > 5 minutes and pain also present in chest (Exception: pain is clearly made worse by movement)    Negative: Age > 40 and no obvious cause and pain even when not moving the arm (Exception: pain is clearly made worse by moving arm or bending neck)    Negative: Red area or streak and fever    Negative: Swollen joint and fever    Negative: Entire arm is swollen    Negative: Patient sounds very sick or weak to the triager    Protocols used: SHOULDER PAIN-A-OH

## 2022-06-16 NOTE — TELEPHONE ENCOUNTER
Routing refill request to provider for review/approval because:  Labs not current:  CBC    Last Written Prescription Date:  6/4/22  Last Fill Quantity: 20#,  # refills: 0   Last office visit: prescribed by UC 6/4/22, last visit 5/23/22 with House  Future Office Visit:   Next 5 appointments (look out 90 days)    Jun 30, 2022  4:30 PM  (Arrive by 4:10 PM)  Pre-Operative Physical with GILES Thompson CNP  [facility] ([facility] ) [address] [place] [address] [phone]         Routing to UC and PCP.     FARHAT Maxwell RN  [facility]

## 2022-06-16 NOTE — TELEPHONE ENCOUNTER
Left message on patient's voicemail to call back and speak with a triage nurse.     LORRAINE PatelN RN  [facility]

## 2022-06-23 NOTE — TELEPHONE ENCOUNTER
Donews message sent to patient.  Future appt 7/1/22  FARHAT Maxwell RN  [facility]

## 2022-07-01 ENCOUNTER — LAB (OUTPATIENT)
Dept: LAB | Facility: CLINIC | Age: 62
End: 2022-07-01
Payer: COMMERCIAL

## 2022-07-01 ENCOUNTER — OFFICE VISIT (OUTPATIENT)
Dept: FAMILY MEDICINE | Facility: CLINIC | Age: 62
End: 2022-07-01
Payer: COMMERCIAL

## 2022-07-01 VITALS
RESPIRATION RATE: 12 BRPM | DIASTOLIC BLOOD PRESSURE: 58 MMHG | BODY MASS INDEX: 31.28 KG/M2 | TEMPERATURE: 98.2 F | SYSTOLIC BLOOD PRESSURE: 100 MMHG | WEIGHT: 183.25 LBS | HEIGHT: 64 IN | HEART RATE: 68 BPM

## 2022-07-01 DIAGNOSIS — M75.41 IMPINGEMENT SYNDROME OF RIGHT SHOULDER: ICD-10-CM

## 2022-07-01 DIAGNOSIS — Z13.220 LIPID SCREENING: ICD-10-CM

## 2022-07-01 DIAGNOSIS — E78.00 HYPERCHOLESTEREMIA: ICD-10-CM

## 2022-07-01 DIAGNOSIS — L40.0 PLAQUE PSORIASIS: ICD-10-CM

## 2022-07-01 DIAGNOSIS — Z01.818 PREOP GENERAL PHYSICAL EXAM: Primary | ICD-10-CM

## 2022-07-01 DIAGNOSIS — M25.511 RIGHT SHOULDER PAIN, UNSPECIFIED CHRONICITY: ICD-10-CM

## 2022-07-01 DIAGNOSIS — I10 HYPERTENSION GOAL BP (BLOOD PRESSURE) < 140/90: ICD-10-CM

## 2022-07-01 DIAGNOSIS — F43.23 ADJUSTMENT DISORDER WITH MIXED ANXIETY AND DEPRESSED MOOD: ICD-10-CM

## 2022-07-01 LAB
ERYTHROCYTE [DISTWIDTH] IN BLOOD BY AUTOMATED COUNT: 12.2 % (ref 10–15)
HCT VFR BLD AUTO: 39.4 % (ref 35–47)
HGB BLD-MCNC: 14.1 G/DL (ref 11.7–15.7)
MCH RBC QN AUTO: 35.3 PG (ref 26.5–33)
MCHC RBC AUTO-ENTMCNC: 35.8 G/DL (ref 31.5–36.5)
MCV RBC AUTO: 99 FL (ref 78–100)
PLATELET # BLD AUTO: 151 10E3/UL (ref 150–450)
RBC # BLD AUTO: 3.99 10E6/UL (ref 3.8–5.2)
WBC # BLD AUTO: 5.8 10E3/UL (ref 4–11)

## 2022-07-01 PROCEDURE — 85027 COMPLETE CBC AUTOMATED: CPT

## 2022-07-01 PROCEDURE — 36415 COLL VENOUS BLD VENIPUNCTURE: CPT

## 2022-07-01 PROCEDURE — 99214 OFFICE O/P EST MOD 30 MIN: CPT | Performed by: NURSE PRACTITIONER

## 2022-07-01 PROCEDURE — 82043 UR ALBUMIN QUANTITATIVE: CPT

## 2022-07-01 PROCEDURE — U0003 INFECTIOUS AGENT DETECTION BY NUCLEIC ACID (DNA OR RNA); SEVERE ACUTE RESPIRATORY SYNDROME CORONAVIRUS 2 (SARS-COV-2) (CORONAVIRUS DISEASE [COVID-19]), AMPLIFIED PROBE TECHNIQUE, MAKING USE OF HIGH THROUGHPUT TECHNOLOGIES AS DESCRIBED BY CMS-2020-01-R: HCPCS | Performed by: NURSE PRACTITIONER

## 2022-07-01 PROCEDURE — 80061 LIPID PANEL: CPT

## 2022-07-01 PROCEDURE — 80048 BASIC METABOLIC PNL TOTAL CA: CPT

## 2022-07-01 PROCEDURE — U0005 INFEC AGEN DETEC AMPLI PROBE: HCPCS | Performed by: NURSE PRACTITIONER

## 2022-07-01 NOTE — PATIENT INSTRUCTIONS
Preparing for Your Surgery  Getting started  A nurse will call you to review your health history and instructions. They will give you an arrival time based on your scheduled surgery time. Please be ready to share:    Your doctor's clinic name and phone number    Your medical, surgical and anesthesia history    A list of allergies and sensitivities    A list of medicines, including herbal treatments and over-the-counter drugs    Whether the patient has a legal guardian (ask how to send us the papers in advance)  Please tell us if you're pregnant--or if there's any chance you might be pregnant. Some surgeries may injure a fetus (unborn baby), so they require a pregnancy test. Surgeries that are safe for a fetus don't always need a test, and you can choose whether to have one.   If you have a child who's having surgery, please ask for a copy of Preparing for Your Child's Surgery.    Preparing for surgery    Within 30 days of surgery: Have a pre-op exam (sometimes called an H&P, or History and Physical). This can be done at a clinic or pre-operative center.  ? If you're having a , you may not need this exam. Talk to your care team.    At your pre-op exam, talk to your care team about all medicines you take. If you need to stop any medicines before surgery, ask when to start taking them again.  ? We do this for your safety. Many medicines can make you bleed too much during surgery. Some change how well surgery (anesthesia) drugs work.    Call your insurance company to let them know you're having surgery. (If you don't have insurance, call [phone].)    Call your clinic if there's any change in your health. This includes signs of a cold or flu (sore throat, runny nose, cough, rash, fever). It also includes a scrape or scratch near the surgery site.    If you have questions on the day of surgery, call your hospital or surgery center.  COVID testing  You may need to be tested for COVID-19 before having  surgery. If so, we will give you instructions.  Eating and drinking guidelines  For your safety: Unless your surgeon tells you otherwise, follow the guidelines below.    Eat and drink as usual until 8 hours before surgery. After that, no food or milk.    Drink clear liquids until 2 hours before surgery. These are liquids you can see through, like water, Gatorade and Propel Water. You may also have black coffee and tea (no cream or milk).    Nothing by mouth within 2 hours of surgery. This includes gum, candy and breath mints.    If you drink alcohol: Stop drinking it the night before surgery.    If your care team tells you to take medicine on the morning of surgery, it's okay to take it with a sip of water.  Preventing infection    Shower or bathe the night before and morning of your surgery. Follow the instructions your clinic gave you. (If no instructions, use regular soap.)    Don't shave or clip hair near your surgery site. We'll remove the hair if needed.    Don't smoke or vape the morning of surgery. You may chew nicotine gum up to 2 hours before surgery. A nicotine patch is okay.  ? Note: Some surgeries require you to completely quit smoking and nicotine. Check with your surgeon.    Your care team will make every effort to keep you safe from infection. We will:  ? Clean our hands often with soap and water (or an alcohol-based hand rub).  ? Clean the skin at your surgery site with a special soap that kills germs.  ? Give you a special gown to keep you warm. (Cold raises the risk of infection.)  ? Wear special hair covers, masks, gowns and gloves during surgery.  ? Give antibiotic medicine, if prescribed. Not all surgeries need antibiotics.  What to bring on the day of surgery    Photo ID and insurance card    Copy of your health care directive, if you have one    Glasses and hearing aides (bring cases)  ? You can't wear contacts during surgery    Inhaler and eye drops, if you use them (tell us about these when  you arrive)    CPAP machine or breathing device, if you use them    A few personal items, if spending the night    If you have . . .  ? A pacemaker, ICD (cardiac defibrillator) or other implant: Bring the ID card.  ? An implanted stimulator: Bring the remote control.  ? A legal guardian: Bring a copy of the certified (court-stamped) guardianship papers.  Please remove any jewelry, including body piercings. Leave jewelry and other valuables at home.  If you're going home the day of surgery    You must have a responsible adult drive you home. They should stay with you overnight as well.    If you don't have someone to stay with you, and you aren't safe to go home alone, we may keep you overnight. Insurance often won't pay for this.  After surgery  If it's hard to control your pain or you need more pain medicine, please call your surgeon's office.  Questions?   If you have any questions for your care team, list them here: _________________________________________________________________________________________________________________________________________________________________________ ____________________________________ ____________________________________ ____________________________________  For informational purposes only. Not to replace the advice of your health care provider. Copyright   2003, 2019 [facility]. All rights reserved. Clinically reviewed by Aziza Maddox MD. [employer] [id number] - REV 07/21.

## 2022-07-01 NOTE — PROGRESS NOTES
[facility] [address] [place] [address]  Phone: [phone]  Primary Provider: Stacia Rodriguez  Pre-op Performing Provider: JAMAL GAMINO      PREOPERATIVE EVALUATION:  Today's date: 7/1/2022    Maggy Goldberg is a 62 year old female who presents for a preoperative evaluation.    Surgical Information:  Surgery/Procedure: Right shoulder repair  Surgery Location: [facility]  Surgeon: Dr. Navarrete  Surgery Date: 07/07/2022  Time of Surgery: TBD  Where patient plans to recover: At home with family  Fax number for surgical facility:    Type of Anesthesia Anticipated: General    Assessment & Plan     The proposed surgical procedure is considered INTERMEDIATE risk.    Preop general physical exam  - Asymptomatic COVID-19 Virus (Coronavirus) by PCR Nasopharyngeal; Future  - Asymptomatic COVID-19 Virus (Coronavirus) by PCR Nose    Impingement syndrome of right shoulder      Hypertension goal BP (blood pressure) < 140/90  At goal on current regimen    Hypercholesteremia      Adjustment disorder with mixed anxiety and depressed mood   Stable, tolerating med, no changes at this time    Plaque psoriasis  Stable, on Humira        RECOMMENDATION:  APPROVAL GIVEN to proceed with proposed procedure, without further diagnostic evaluation.    Subjective     HPI related to upcoming procedure: Right shoulder repair    Preop Questions 6/24/2022   1. Have you ever had a heart attack or stroke? No   2. Have you ever had surgery on your heart or blood vessels, such as a stent placement, a coronary artery bypass, or surgery on an artery in your head, neck, heart, or legs? No   3. Do you have chest pain with activity? No   4. Do you have a history of  heart failure? No   5. Do you currently have a cold, bronchitis or symptoms of other infection? No   6. Do you have a cough, shortness of breath, or wheezing? No   7. Do you or anyone in your family have previous history of  blood clots? YES - Mother   8. Do you or does anyone in your family have a serious bleeding problem such as prolonged bleeding following surgeries or cuts? YES - Son has Von Willebrand     9. Have you ever had problems with anemia or been told to take iron pills? No   10. Have you had any abnormal blood loss such as black, tarry or bloody stools, or abnormal vaginal bleeding? No   11. Have you ever had a blood transfusion? No   12. Are you willing to have a blood transfusion if it is medically needed before, during, or after your surgery? Yes   13. Have you or any of your relatives ever had problems with anesthesia? No   14. Do you have sleep apnea, excessive snoring or daytime drowsiness? No   15. Do you have any artifical heart valves or other implanted medical devices like a pacemaker, defibrillator, or continuous glucose monitor? No   16. Do you have artificial joints? No   17. Are you allergic to latex? No       Health Care Directive:  Patient does not have a Health Care Directive or Living Will: Discussed advance care planning with patient; however, patient declined at this time.    Preoperative Review of :   reviewed - controlled substances reflected in medication list.          Review of Systems  CONSTITUTIONAL: NEGATIVE for fever, chills, change in weight  INTEGUMENTARY/SKIN: NEGATIVE for worrisome rashes, moles or lesions  EYES: NEGATIVE for vision changes or irritation  ENT/MOUTH: NEGATIVE for ear, mouth and throat problems  RESP: NEGATIVE for significant cough or SOB  CV: NEGATIVE for chest pain, palpitations or peripheral edema  GI: NEGATIVE for nausea, abdominal pain, heartburn, or change in bowel habits  : NEGATIVE for frequency, dysuria, or hematuria  MUSCULOSKELETAL: NEGATIVE for significant arthralgias or myalgia  NEURO: NEGATIVE for weakness, dizziness or paresthesias  ENDOCRINE: NEGATIVE for temperature intolerance, skin/hair changes  HEME: NEGATIVE for bleeding problems  PSYCHIATRIC:  NEGATIVE for changes in mood or affect    Patient Active Problem List    Diagnosis Date Noted     Cervical cancer screening 08/11/2021     Priority: Medium     Pt takes Humira  08/19/11 ASCUS Pap, Neg HR HPV  12/19/12 NIL Pap  05/2/16 NIL Pap, Neg HR HPV  08/4/21 NIL Pap, Neg HR HPV. Plan: cotest in 3 years while on immunosuppressant meds        Cervical segment dysfunction 01/30/2018     Priority: Medium     Cervicalgia 01/30/2018     Priority: Medium     Right shoulder pain, unspecified chronicity 01/30/2018     Priority: Medium     Bicipital tendinitis of right shoulder 01/30/2018     Priority: Medium     Lentigo 06/26/2016     Priority: Medium     Plaque psoriasis 06/26/2016     Priority: Medium     Lipoma of lower extremity, unspecified laterality 06/26/2016     Priority: Medium     Achrochordon 06/26/2016     Priority: Medium     Dermatofibroma 06/26/2016     Priority: Medium     Skin cancer screening 06/26/2016     Priority: Medium     Impingement syndrome of right shoulder 01/27/2015     Priority: Medium     Hypercholesteremia      Priority: Medium     Psoriasis      Priority: Medium     Atypical nevi 03/30/2012     Priority: Medium     Hypertension goal BP (blood pressure) < 140/90      Priority: Medium     OCD (obsessive compulsive disorder) 01/04/2011     Priority: Medium     CMC arthritis, thumb, degenerative 10/08/2010     Priority: Medium     CARDIOVASCULAR SCREENING; LDL GOAL LESS THAN 100 05/09/2010     Priority: Medium     Allergic rhinitis 12/04/2007     Priority: Medium     Problem list name updated by automated process. Provider to review       Adjustment disorder with mixed anxiety and depressed mood 06/21/2007     Priority: Medium     Contact dermatitis and other eczema, due to unspecified cause 06/21/2007     Priority: Medium      Past Medical History:   Diagnosis Date     Adjustment disorder with mixed anxiety and depressed mood      CARDIOVASCULAR SCREENING; LDL GOAL LESS THAN 100 5/9/2010      CARDIOVASCULAR SCREENING; LDL GOAL LESS THAN 160      CMC arthritis, thumb, degenerative      Contact dermatitis and other eczema, due to unspecified cause     contact and exzema     HTN (hypertension)      Hypercholesteremia      Hypertension goal BP (blood pressure) < 140/90      Iritis 2010     OCD (obsessive compulsive disorder)      Psoriasis      Type II or unspecified type diabetes mellitus without mention of complication, not stated as uncontrolled      Uncomplicated type 2 diabetes mellitus (H) 10/14/2011     No past surgical history on file.  Current Outpatient Medications   Medication Sig Dispense Refill     adalimumab (HUMIRA *CF* PEN) 40 MG/0.4ML pen kit Inject 0.4 mLs (40 mg) Subcutaneous every 14 days 6 each 1     ASPIRIN 325 MG OR TABS one daily       buPROPion (WELLBUTRIN XL) 150 MG 24 hr tablet        buPROPion (WELLBUTRIN) 100 MG tablet Take by mouth daily       clonazePAM (KLONOPIN) 1 MG tablet Take 1 mg by mouth 2 times daily       gabapentin (NEURONTIN) 300 MG capsule Take 100 mg by mouth daily        lisinopril (ZESTRIL) 40 MG tablet Take 1 tablet (40 mg) by mouth daily 90 tablet 3     metoprolol tartrate (LOPRESSOR) 50 MG tablet Take 1 tablet (50 mg) by mouth daily 90 tablet 3     piroxicam (FELDENE) 10 MG capsule Take 1 capsule (10 mg) by mouth daily as needed (shoulder pain) 20 capsule 0     Sertraline HCl (ZOLOFT PO) Take 200 mg by mouth daily       triamterene-HCTZ (DYAZIDE) 37.5-25 MG capsule Take 1 capsule by mouth daily 90 capsule 3     tacrolimus (PROTOPIC) 0.1 % external ointment Apply topically BID to AA - alternating with topical corticosteroids 60 g 3       Allergies   Allergen Reactions     Pertussis Vaccine      Amoxicillin Rash     Clindamycin Rash        Social History     Tobacco Use     Smoking status: Former Smoker     Types: Cigarettes     Smokeless tobacco: Never Used     Tobacco comment: 1/2 PPD   Substance Use Topics     Alcohol use: Yes     Comment: a couple per  day       History   Drug Use No         Objective     LMP 10/19/2010     Physical Exam    GENERAL APPEARANCE: healthy, alert and no distress     EYES: EOMI, PERRL     HENT: ear canals and TM's normal and nose and mouth without ulcers or lesions     NECK: no adenopathy, no asymmetry, masses, or scars and thyroid normal to palpation     RESP: lungs clear to auscultation - no rales, rhonchi or wheezes     CV: regular rates and rhythm, normal S1 S2, no S3 or S4 and no murmur, click or rub     ABDOMEN:  soft, nontender, no HSM or masses and bowel sounds normal     MS: extremities normal- no gross deformities noted, no evidence of inflammation in joints, FROM in all extremities.     SKIN: no suspicious lesions or rashes     NEURO: Normal strength and tone, sensory exam grossly normal, mentation intact and speech normal     PSYCH: mentation appears normal. and affect normal/bright     LYMPHATICS: No cervical adenopathy    Recent Labs   Lab Test 07/01/22  1427 08/04/21  1058 07/09/21  1058   HGB 14.1  --   --      --   --    NA  --  142 135   POTASSIUM  --  3.6 4.2   CR  --  0.98 1.12*   A1C  --  5.1  --         Diagnostics:  Labs pending at this time.  Results will be reviewed when available.   No EKG this visit, completed in the last 90 days.    Revised Cardiac Risk Index (RCRI):  The patient has the following serious cardiovascular risks for perioperative complications:   - No serious cardiac risks = 0 points     RCRI Interpretation: 0 points: Class I (very low risk - 0.4% complication rate)           Signed Electronically by: GILES Cantu CNP  Copy of this evaluation report is provided to requesting physician.

## 2022-07-02 LAB
ANION GAP SERPL CALCULATED.3IONS-SCNC: 8 MMOL/L (ref 3–14)
BUN SERPL-MCNC: 20 MG/DL (ref 7–30)
CALCIUM SERPL-MCNC: 9.1 MG/DL (ref 8.5–10.1)
CHLORIDE BLD-SCNC: 110 MMOL/L (ref 94–109)
CHOLEST SERPL-MCNC: 280 MG/DL
CO2 SERPL-SCNC: 22 MMOL/L (ref 20–32)
CREAT SERPL-MCNC: 0.85 MG/DL (ref 0.52–1.04)
CREAT UR-MCNC: 104 MG/DL
FASTING STATUS PATIENT QL REPORTED: ABNORMAL
GFR SERPL CREATININE-BSD FRML MDRD: 77 ML/MIN/1.73M2
GLUCOSE BLD-MCNC: 116 MG/DL (ref 70–99)
HDLC SERPL-MCNC: 83 MG/DL
LDLC SERPL CALC-MCNC: 160 MG/DL
MICROALBUMIN UR-MCNC: 8 MG/L
MICROALBUMIN/CREAT UR: 7.69 MG/G CR (ref 0–25)
NONHDLC SERPL-MCNC: 197 MG/DL
POTASSIUM BLD-SCNC: 3.7 MMOL/L (ref 3.4–5.3)
SARS-COV-2 RNA RESP QL NAA+PROBE: NEGATIVE
SODIUM SERPL-SCNC: 140 MMOL/L (ref 133–144)
TRIGL SERPL-MCNC: 184 MG/DL

## 2022-07-06 NOTE — RESULT ENCOUNTER NOTE
The results of your recent lipid (cholesterol) profile were abnormal.    Here are the results:  Lab Results       Component                Value               Date                       CHOL                     280                 07/01/2022                 CHOL                     231                 05/21/2019            Lab Results       Component                Value               Date                       HDL                      83                  07/01/2022                 HDL                      61                  05/21/2019            Lab Results       Component                Value               Date                       LDL                      160                 07/01/2022                 LDL                      142                 05/21/2019            Lab Results       Component                Value               Date                       TRIG                     184                 07/01/2022                 TRIG                     138                 05/21/2019            Lab Results       Component                Value               Date                       CHOLHDLRATIO             3.3                 05/15/2014              Desired or goal levels are:  CHOLESTEROL: Desirable is less than 200.   HDL (Good Cholesterol): Desirable is greater than 40 (for men) greater than 50 (for women).  LDL (Bad Cholesterol): Desirable is less than 130 (or less than 100 if you have heart disease or diabetes). Borderline 130-160.  TRIGLYCERIDES: Desirable is less than 150.  Borderline is 150-200.    To help lower your LDL (bad cholesterol) you could start adding ground flax seed to your diet. The recommended dose is 2 heaping tablespoonfuls daily, you may want to start with 1 tablespoonful and increase to 2 heaping tablespoonfuls. You can mix or add ground flax seed to hot cereals, yogurt, applesauce, cottage cheese or any sauce mixture that is your portion. Ground flax seed can be found in the baking  aisle near the flour.      As you may know, an elevated cholesterol is one factor that increases your risk for heart disease and stroke. You can improve your cholesterol by controlling the amount and type of fat you eat and by increasing your daily activity level.    Here are some ways to improve your nutrition:  Eat less fat (especially butter, Crisco and other saturated fats)  Buy lean cuts of meat, reduce your portions of red meat or substitute poultry or fish  Use skim milk and low-fat dairy products  Eat no more than 4 egg yolks per week  Avoid fried or fast foods that are high in fat  Eat more fruits and vegetables      Also consider starting or increasing your aerobic activity. Aerobic activity is the best way to improve HDL (good) cholesterol. If this would be new to you, please talk with me first about what activities are safe for you.      Other lab results:    The rest of your lab results were stable. Your urine protein test improved back to normal.     Your blood glucose (blood sugar) was slightly elevated. You do not have diabetes yet but we consider this a pre-diabetic state.  I would recommend rechecking your blood glucose in one year. Improving lifestyle habits such as regular exercise, good diet and weight loss will hopefully slow or stop the progression towards diabetes.       Please feel free to contact us with any questions or if you would like more information.          Stacia Rodriguez M.D.

## 2022-07-07 ENCOUNTER — TRANSFERRED RECORDS (OUTPATIENT)
Dept: HEALTH INFORMATION MANAGEMENT | Facility: CLINIC | Age: 62
End: 2022-07-07

## 2022-07-20 ENCOUNTER — TRANSFERRED RECORDS (OUTPATIENT)
Dept: HEALTH INFORMATION MANAGEMENT | Facility: CLINIC | Age: 62
End: 2022-07-20

## 2022-07-22 ENCOUNTER — TRANSCRIBE ORDERS (OUTPATIENT)
Dept: OTHER | Age: 62
End: 2022-07-22

## 2022-07-22 DIAGNOSIS — M75.101 TEAR OF RIGHT ROTATOR CUFF: Primary | ICD-10-CM

## 2022-08-01 ENCOUNTER — THERAPY VISIT (OUTPATIENT)
Dept: PHYSICAL THERAPY | Facility: CLINIC | Age: 62
End: 2022-08-01
Attending: PHYSICIAN ASSISTANT
Payer: COMMERCIAL

## 2022-08-01 DIAGNOSIS — M25.511 ACUTE PAIN OF RIGHT SHOULDER: ICD-10-CM

## 2022-08-01 DIAGNOSIS — Z47.89 AFTERCARE FOLLOWING SURGERY OF THE MUSCULOSKELETAL SYSTEM: ICD-10-CM

## 2022-08-01 PROCEDURE — 97161 PT EVAL LOW COMPLEX 20 MIN: CPT | Mod: GP | Performed by: PHYSICAL THERAPIST

## 2022-08-01 PROCEDURE — 97110 THERAPEUTIC EXERCISES: CPT | Mod: GP | Performed by: PHYSICAL THERAPIST

## 2022-08-01 NOTE — PROGRESS NOTES
Physical Therapy Initial Evaluation  August 1, 2022    Precautions/Restrictions/MD instructions: Post-surgical protocol for RC repair.   Initial precautions/restrictions: sling for 6 weeks. No ROM for 4 weeks, then start PT. No resisted elbow flexion for 6 weeks.     Date of Surgery: 7/7/22.    Surgical Procedure/Limb: s/p R RC repair (supra, infra), biceps tenodesis, bony acromioplasty (pt is RHD)  Surgeon Name: Dr. Navarrete ([facility])  Average Daily Pain Levels: 2/10 (Location: R lateral shoulder; Quality: Aching/Throbbing)  Other Symptoms: None  Symptom Mgmt Strategies: Sling, ice, very rarely pain medication  Prior orthopaedic history/procedures: Refer to EMR  Prior non-operative management: PT, CSI  Next MD Appt Date: 2 weeks (6 week post op visit)    Functional limitations following procedure: Unable to use post-surgical extremity for ADLs    Answers for HPI/ROS submitted by the patient on 7/28/2022  Reason for Visit:: Right shoulder therapy  When problem began:: 7/7/2022  How problem occurred:: Surgery  Number scale: 2/10  General health as reported by patient: good  Please check all that apply to your current or past medical history: depression, history of fractures, high blood pressure, mental illness, overweight  Medical allergies: adhesives  Surgeries: orthopedic surgery, other  Other Surgery Detail: Tubal, vocal cords, TMJ, bone growth  Medications you are currently taking: anti-depressants, anti-inflammatory, high blood pressure medication, other  Other Meds Detail: Humira  Occupation:: Health unit coordinator  What are your primary job tasks: computer work, prolonged sitting, repetitive tasks    Post-Operative Physical Therapy Examination    Anthropometric Measures    Joint ROM Right Left Difference   Flexion 120 deg 170 deg 50 deg   External Rotation at neutral 43 deg 70 deg 27 deg   Internal Rotation at neutral n/a 70 deg    Abduction n/a 170 deg         Right Left   Deltoid Muscle Activation WNL  WNL     Status of Incision: Clean & healing    Elbow extension lacking 2deg from 0, full elbow flexion    Assessment/Plan:    The patient is a 62 year old female with chief complaint of R shoulder pain.    The patient has the following significant findings with corresponding treatment plan.  Diagnosis 1:  s/p R RC repair (supra, infra), biceps tenodesis, bony acromioplasty    Pain -  hot/cold therapy, electric stimulation, manual therapy, splint/taping/bracing/orthotics and self management  Decreased ROM/flexibility - manual therapy, therapeutic exercise and home program  Decreased joint mobility - manual therapy and therapeutic exercise  Decreased strength - therapeutic exercise, therapeutic activities and home program  Impaired muscle performance - neuro re-education  Decreased function - therapeutic activities  Impaired posture - neuro re-education    Therapy Evaluation Codes:   1) History comprised of:   Personal factors that impact the plan of care:      Please refer to EMR.    Comorbidity factors that impact the plan of care are:      Please refer to EMR.     Medications impacting care: None.  2) Examination of Body Systems comprised of:   Body structures and functions that impact the plan of care:      Shoulder.   Activity limitations that impact the plan of care are:      Bathing, Cooking, Driving, Dressing, Grasping, Lifting, Reading/Computer work and Working.   Clinical presentation characteristics are:    Stable/Uncomplicated.  3) Presentation comprised of:   Presentation scored as Low complexity with uncomplicated characteristics..  4) Decision-Making    Low complexity using standardized patient assessment instrument and/or measureable assessment of functional outcome.  Cumulative Therapy Evaluation is: Low complexity.    Previous and current functional limitations:  (See Goal Flow Sheet for this information)    Short term and Long term goals: (See Goal Flow Sheet for this information)     Communication  ability:  Patient appears to be able to clearly communicate and understand verbal and written communication and follow directions correctly.  Treatment Explanation - The following has been discussed with the patient: RX ordered/plan of care, anticipated outcomes, and possible risks and side effects.  This patient would benefit from PT intervention to resume normal activities.   Rehab potential is good.    Frequency:  1 X week, once daily  Duration:  for 5 weeks tapering to 2x/month for 3 months  Discharge Plan: Achieve all LTGs, be independent in home treatment program, and reach maximal therapeutic benefit.    Please refer to the daily flowsheet for treatment today, total treatment time and time spent performing 1:1 timed codes.

## 2022-08-01 NOTE — LETTER
[facility]  [address] [place] [address]  [phone]    2022    Re: Maggy Goldberg   :   1960  MRN:  [id number]   REFERRING PHYSICIAN:   [facility]    Date of Initial Evaluation: 2022  Visits:  Rxs Used: 1  Reason for Referral:     Acute pain of right shoulder  Aftercare following surgery of the musculoskeletal system    EVALUATION SUMMARY    Physical Therapy Initial Evaluation  2022    Precautions/Restrictions/MD instructions: Post-surgical protocol for RC repair.   Initial precautions/restrictions: sling for 6 weeks. No ROM for 4 weeks, then start PT. No resisted elbow flexion for 6 weeks.     Date of Surgery: 22.    Surgical Procedure/Limb: s/p R RC repair (supra, infra), biceps tenodesis, bony acromioplasty (pt is RHD)  Surgeon Name: Dr. Navarrete ([facility])  Average Daily Pain Levels: 2/10 (Location: R lateral shoulder; Quality: Aching/Throbbing)  Other Symptoms: None  Symptom Mgmt Strategies: Sling, ice, very rarely pain medication  Prior orthopaedic history/procedures: Refer to EMR  Prior non-operative management: PT, CSI  Next MD Appt Date: 2 weeks (6 week post op visit)    Functional limitations following procedure: Unable to use post-surgical extremity for ADLs    Answers for HPI/ROS submitted by the patient on 2022  Reason for Visit:: Right shoulder therapy  When problem began:: 2022  How problem occurred:: Surgery  Number scale: 2/10  General health as reported by patient: good  Please check all that apply to your current or past medical history: depression, history of fractures, high blood pressure, mental illness, overweight  Re: Maggy CAMPBELL Ashlyn   :   1960  MRN:  [id number]    Medical allergies: adhesives  Surgeries: orthopedic surgery, other  Other Surgery Detail: Tubal, vocal cords, TMJ, bone growth  Medications you are  currently taking: anti-depressants, anti-inflammatory, high blood pressure medication, other  Other Meds Detail: Humira  Occupation:: Health unit coordinator  What are your primary job tasks: computer work, prolonged sitting, repetitive tasks    Post-Operative Physical Therapy Examination    Anthropometric Measures    Joint ROM Right Left Difference   Flexion 120 deg 170 deg 50 deg   External Rotation at neutral 43 deg 70 deg 27 deg   Internal Rotation at neutral n/a 70 deg    Abduction n/a 170 deg       Right Left   Deltoid Muscle Activation WNL WNL     Status of Incision: Clean & healing    Elbow extension lacking 2deg from 0, full elbow flexion    Assessment/Plan:    The patient is a 62 year old female with chief complaint of R shoulder pain.    The patient has the following significant findings with corresponding treatment plan.  Diagnosis 1:  s/p R RC repair (supra, infra), biceps tenodesis, bony acromioplasty    Pain -  hot/cold therapy, electric stimulation, manual therapy, splint/taping/bracing/orthotics and self management  Decreased ROM/flexibility - manual therapy, therapeutic exercise and home program  Decreased joint mobility - manual therapy and therapeutic exercise  Decreased strength - therapeutic exercise, therapeutic activities and home program  Impaired muscle performance - neuro re-education  Decreased function - therapeutic activities  Impaired posture - neuro re-education    Therapy Evaluation Codes:   1) History comprised of:   Personal factors that impact the plan of care:      Please refer to EMR.    Comorbidity factors that impact the plan of care are:      Please refer to EMR.    Re: Maggy Goldberg   :   1960  MRN:  [id number]     Medications impacting care: None.  2) Examination of Body Systems comprised of:   Body structures and functions that impact the plan of care:      Shoulder.   Activity limitations that impact the plan of care are:      Bathing, Cooking, Driving, Dressing,  Grasping, Lifting, Reading/Computer work and Working.   Clinical presentation characteristics are:    Stable/Uncomplicated.  3) Presentation comprised of:   Presentation scored as Low complexity with uncomplicated characteristics..  4) Decision-Making    Low complexity using standardized patient assessment instrument and/or measureable assessment of functional outcome.  Cumulative Therapy Evaluation is: Low complexity.    Previous and current functional limitations:  (See Goal Flow Sheet for this information)    Short term and Long term goals: (See Goal Flow Sheet for this information)     Communication ability:  Patient appears to be able to clearly communicate and understand verbal and written communication and follow directions correctly.  Treatment Explanation - The following has been discussed with the patient: RX ordered/plan of care, anticipated outcomes, and possible risks and side effects.  This patient would benefit from PT intervention to resume normal activities.   Rehab potential is good.    Frequency:  1 X week, once daily  Duration:  for 5 weeks tapering to 2x/month for 3 months  Discharge Plan: Achieve all LTGs, be independent in home treatment program, and reach maximal therapeutic benefit.      Thank you for your referral.    INQUIRIES  Therapist: Tan Carrasco PT   [facility] [address] [place] [address]  Phone: [phone]  Fax: [phone]

## 2022-08-08 ENCOUNTER — THERAPY VISIT (OUTPATIENT)
Dept: PHYSICAL THERAPY | Facility: CLINIC | Age: 62
End: 2022-08-08
Payer: COMMERCIAL

## 2022-08-08 DIAGNOSIS — M25.511 ACUTE PAIN OF RIGHT SHOULDER: Primary | ICD-10-CM

## 2022-08-08 DIAGNOSIS — Z47.89 AFTERCARE FOLLOWING SURGERY OF THE MUSCULOSKELETAL SYSTEM: ICD-10-CM

## 2022-08-08 PROCEDURE — 97110 THERAPEUTIC EXERCISES: CPT | Mod: GP | Performed by: PHYSICAL THERAPIST

## 2022-08-08 PROCEDURE — 97140 MANUAL THERAPY 1/> REGIONS: CPT | Mod: GP | Performed by: PHYSICAL THERAPIST

## 2022-08-08 NOTE — LETTER
[facility]  [address] [place] [address]  [phone]    2022    Re: Maggy Goldberg   :   1960  MRN:  [id number]   REFERRING PHYSICIAN:   Polo MORRIS [facility]  Date of Initial Evaluation:  22  Visits:  Rxs Used: 2  Reason for Referral:     Acute pain of right shoulder  Aftercare following surgery of the musculoskeletal system    EVALUATION SUMMARY    PROGRESS  REPORT  Progress reporting period is from 2022 to 2022.       SUBJECTIVE   Subjective: Patient has attended 2 PT sessions. Patient states she is doing really great, having low levels of pain, and exercises are going well. She does disclose she is taking her sling off more than she should, and may be utilizing her arm more than she should. PT provided education on how important it is to protect the repair in this stage as the repair isn't very strong during this stage of recovery. Patient agreed to wearing sling more and to work on not utilizing her shoulder.      Current Pain level: 0/10.     Previous pain level was  3/10  .   Changes in function:  Yes (See Goal flowsheet attached for changes in current functional level)  Adverse reaction to treatment or activity: None    OBJECTIVE  Changes noted in objective findings:  Yes,   Objective: Shoulder PROM: ER = 43, Flexion = 120. Emphasis on not stretching, instead flowing through movements, and absolutely not holding exercises.     ASSESSMENT/PLAN  Updated problem list and treatment plan: Diagnosis 1:  Signs and symptoms consistent with s/p R RTC repair with biceps tenodesis  Pain -  manual therapy, splint/taping/bracing/orthotics, self management, education, home program and neuro re-education  Decreased ROM/flexibility - manual therapy, therapeutic exercise, therapeutic activity, home program and neuro re-education  Decreased joint mobility - manual therapy, therapeutic  exercise, therapeutic activity, home program and neuro re-education   Decreased strength - therapeutic exercise, therapeutic activities, home program and neuro re-education   Decreased function - therapeutic activities and home program  Re: Maggy Goldberg   :   1960    STG/LTGs have been met or progress has been made towards goals:  Yes (See Goal flow sheet completed today.)  Assessment of Progress: The patient's condition is improving.  The patient's condition has potential to improve.  Self Management Plans:  Patient has been instructed in a home treatment program.  Patient  has been instructed in self management of symptoms.  I have re-evaluated this patient and find that the nature, scope, duration and intensity of the therapy is appropriate for the medical condition of the patient.  Maggy continues to require the following intervention to meet STG and LTG's:  PT    Recommendations:  This patient would benefit from continued therapy.     Frequency:  1 X week, once daily  Duration:  for 12 visits    Thank you for your referral.    INQUIRIES  Therapist: Angelica Khanna, PT, DPT  [facility] [address] [place] [address]  Phone: [phone]  Fax: [phone]

## 2022-08-09 NOTE — PROGRESS NOTES
PROGRESS  REPORT    Progress reporting period is from 8/1/2022 to 8/8/2022.       SUBJECTIVE   Subjective: Patient has attended 2 PT sessions. Patient states she is doing really great, having low levels of pain, and exercises are going well. She does disclose she is taking her sling off more than she should, and may be utilizing her arm more than she should. PT provided education on how important it is to protect the repair in this stage as the repair isn't very strong during this stage of recovery. Patient agreed to wearing sling more and to work on not utilizing her shoulder.       Current Pain level: 0/10.     Previous pain level was  3/10  .   Changes in function:  Yes (See Goal flowsheet attached for changes in current functional level)  Adverse reaction to treatment or activity: None    OBJECTIVE  Changes noted in objective findings:  Yes,   Objective: Shoulder PROM: ER = 43, Flexion = 120. Emphasis on not stretching, instead flowing through movements, and absolutely not holding exercises.     ASSESSMENT/PLAN  Updated problem list and treatment plan: Diagnosis 1:  Signs and symptoms consistent with s/p R RTC repair with biceps tenodesis  Pain -  manual therapy, splint/taping/bracing/orthotics, self management, education, home program and neuro re-education  Decreased ROM/flexibility - manual therapy, therapeutic exercise, therapeutic activity, home program and neuro re-education  Decreased joint mobility - manual therapy, therapeutic exercise, therapeutic activity, home program and neuro re-education   Decreased strength - therapeutic exercise, therapeutic activities, home program and neuro re-education   Decreased function - therapeutic activities and home program  STG/LTGs have been met or progress has been made towards goals:  Yes (See Goal flow sheet completed today.)  Assessment of Progress: The patient's condition is improving.  The patient's condition has potential to improve.  Self Management Plans:   Patient has been instructed in a home treatment program.  Patient  has been instructed in self management of symptoms.  I have re-evaluated this patient and find that the nature, scope, duration and intensity of the therapy is appropriate for the medical condition of the patient.  Maggy continues to require the following intervention to meet STG and LTG's:  PT    Recommendations:  This patient would benefit from continued therapy.     Frequency:  1 X week, once daily  Duration:  for 12 visits        Please refer to the daily flowsheet for treatment today, total treatment time and time spent performing 1:1 timed codes.

## 2022-08-15 ENCOUNTER — THERAPY VISIT (OUTPATIENT)
Dept: PHYSICAL THERAPY | Facility: CLINIC | Age: 62
End: 2022-08-15
Payer: COMMERCIAL

## 2022-08-15 ENCOUNTER — TRANSFERRED RECORDS (OUTPATIENT)
Dept: HEALTH INFORMATION MANAGEMENT | Facility: CLINIC | Age: 62
End: 2022-08-15

## 2022-08-15 DIAGNOSIS — M25.511 ACUTE PAIN OF RIGHT SHOULDER: Primary | ICD-10-CM

## 2022-08-15 DIAGNOSIS — Z47.89 AFTERCARE FOLLOWING SURGERY OF THE MUSCULOSKELETAL SYSTEM: ICD-10-CM

## 2022-08-15 PROCEDURE — 97140 MANUAL THERAPY 1/> REGIONS: CPT | Mod: GP | Performed by: PHYSICAL THERAPIST

## 2022-08-15 PROCEDURE — 97110 THERAPEUTIC EXERCISES: CPT | Mod: GP | Performed by: PHYSICAL THERAPIST

## 2022-08-29 ENCOUNTER — VIRTUAL VISIT (OUTPATIENT)
Dept: PHYSICAL THERAPY | Facility: CLINIC | Age: 62
End: 2022-08-29
Payer: COMMERCIAL

## 2022-08-29 DIAGNOSIS — Z47.89 AFTERCARE FOLLOWING SURGERY OF THE MUSCULOSKELETAL SYSTEM: ICD-10-CM

## 2022-08-29 DIAGNOSIS — M25.511 ACUTE PAIN OF RIGHT SHOULDER: Primary | ICD-10-CM

## 2022-08-29 PROCEDURE — 97110 THERAPEUTIC EXERCISES: CPT | Mod: GP | Performed by: PHYSICAL THERAPIST

## 2022-09-06 ENCOUNTER — THERAPY VISIT (OUTPATIENT)
Dept: PHYSICAL THERAPY | Facility: CLINIC | Age: 62
End: 2022-09-06
Payer: COMMERCIAL

## 2022-09-06 DIAGNOSIS — M25.511 ACUTE PAIN OF RIGHT SHOULDER: Primary | ICD-10-CM

## 2022-09-06 DIAGNOSIS — Z47.89 AFTERCARE FOLLOWING SURGERY OF THE MUSCULOSKELETAL SYSTEM: ICD-10-CM

## 2022-09-06 PROCEDURE — 97110 THERAPEUTIC EXERCISES: CPT | Mod: GP | Performed by: PHYSICAL THERAPIST

## 2022-09-20 ENCOUNTER — THERAPY VISIT (OUTPATIENT)
Dept: PHYSICAL THERAPY | Facility: CLINIC | Age: 62
End: 2022-09-20
Payer: COMMERCIAL

## 2022-09-20 DIAGNOSIS — M25.511 ACUTE PAIN OF RIGHT SHOULDER: Primary | ICD-10-CM

## 2022-09-20 DIAGNOSIS — Z47.89 AFTERCARE FOLLOWING SURGERY OF THE MUSCULOSKELETAL SYSTEM: ICD-10-CM

## 2022-09-20 PROCEDURE — 97140 MANUAL THERAPY 1/> REGIONS: CPT | Mod: GP

## 2022-09-20 PROCEDURE — 97110 THERAPEUTIC EXERCISES: CPT | Mod: GP

## 2022-09-26 ENCOUNTER — TRANSFERRED RECORDS (OUTPATIENT)
Dept: HEALTH INFORMATION MANAGEMENT | Facility: CLINIC | Age: 62
End: 2022-09-26

## 2022-10-04 ENCOUNTER — THERAPY VISIT (OUTPATIENT)
Dept: PHYSICAL THERAPY | Facility: CLINIC | Age: 62
End: 2022-10-04
Payer: COMMERCIAL

## 2022-10-04 DIAGNOSIS — Z47.89 AFTERCARE FOLLOWING SURGERY OF THE MUSCULOSKELETAL SYSTEM: ICD-10-CM

## 2022-10-04 DIAGNOSIS — M25.511 ACUTE PAIN OF RIGHT SHOULDER: Primary | ICD-10-CM

## 2022-10-04 PROCEDURE — 97110 THERAPEUTIC EXERCISES: CPT | Mod: GP | Performed by: PHYSICAL THERAPIST

## 2022-10-15 ENCOUNTER — HEALTH MAINTENANCE LETTER (OUTPATIENT)
Age: 62
End: 2022-10-15

## 2022-10-18 ENCOUNTER — THERAPY VISIT (OUTPATIENT)
Dept: PHYSICAL THERAPY | Facility: CLINIC | Age: 62
End: 2022-10-18
Payer: COMMERCIAL

## 2022-10-18 DIAGNOSIS — M25.511 ACUTE PAIN OF RIGHT SHOULDER: Primary | ICD-10-CM

## 2022-10-18 DIAGNOSIS — Z47.89 AFTERCARE FOLLOWING SURGERY OF THE MUSCULOSKELETAL SYSTEM: ICD-10-CM

## 2022-10-18 PROCEDURE — 97110 THERAPEUTIC EXERCISES: CPT | Mod: GP | Performed by: PHYSICAL THERAPIST

## 2022-10-18 PROCEDURE — 97112 NEUROMUSCULAR REEDUCATION: CPT | Mod: GP | Performed by: PHYSICAL THERAPIST

## 2022-10-18 NOTE — PROGRESS NOTES
PROGRESS  REPORT    Progress reporting period is from 8/1/2022 to 10/18/2022.       SUBJECTIVE  Subjective: patient states that work has been better this past week with less pain at night and later in the week. She states that her arm was beginning to feel stronger at the beginning of last week, but has since felt weaker. She also has painful clicking and popping if she reaches outside of her comfort zone. PT advised again to only utilze shoulder within available range of motion. Patient also states that she tripped and fell on cement yesterday. Does have abrasion on L hand, but did not land on R shoulder, instead landed on R chest.    Current Pain level: 0/10.     Previous pain level was  3/10  .   Changes in function:  Yes (See Goal flowsheet attached for changes in current functional level)  Adverse reaction to treatment or activity: None    OBJECTIVE  Changes noted in objective findings:  Yes,   Objective: External rotation lag sign = +, ER = 1+-2/5 with trace to minimal activaiton, no pain     ASSESSMENT/PLAN  Updated problem list and treatment plan: Diagnosis 1:  S/p R RTC repair  Pain -  manual therapy, splint/taping/bracing/orthotics, self management, education, home program and neuro re-education   Decreased ROM/flexibility - manual therapy, therapeutic exercise, therapeutic activity, home program and neuro re-education   Decreased joint mobility - manual therapy, therapeutic exercise, therapeutic activity, home program and neuro re-education   Decreased strength - therapeutic exercise, therapeutic activities, home program and neuro re-education   Decreased function - therapeutic activities, home program and neuro re-education   STG/LTGs have been met or progress has been made towards goals:  Yes (See Goal flow sheet completed today.)  Assessment of Progress: The patient's progress has plateaued.  The patient's progress has slowed.  Self Management Plans:  Patient has been instructed in a home treatment  program.  Patient  has been instructed in self management of symptoms.  I have re-evaluated this patient and find that the nature, scope, duration and intensity of the therapy is appropriate for the medical condition of the patient.  Maggy continues to require the following intervention to meet STG and LTG's:  PT    Recommendations:  This patient would benefit from continued therapy.     Frequency:  1 X week, once daily  Duration:  for 6 visits        Please refer to the daily flowsheet for treatment today, total treatment time and time spent performing 1:1 timed codes.

## 2022-10-18 NOTE — LETTER
[facility]  [address] [place] [address]  [phone]    2022    Re: Maggy Goldberg   :   1960  MRN:  [id number]   REFERRING PHYSICIAN:   Polo MORRIS [facility]  Date of Initial Evaluation:  22  Visits:  Rxs Used: 8  Reason for Referral:     Acute pain of right shoulder  Aftercare following surgery of the musculoskeletal system    EVALUATION SUMMARY    PROGRESS  REPORT  Progress reporting period is from 2022 to 10/18/2022.       SUBJECTIVE  Subjective: patient states that work has been better this past week with less pain at night and later in the week. She states that her arm was beginning to feel stronger at the beginning of last week, but has since felt weaker. She also has painful clicking and popping if she reaches outside of her comfort zone. PT advised again to only utilze shoulder within available range of motion. Patient also states that she tripped and fell on cement yesterday. Does have abrasion on L hand, but did not land on R shoulder, instead landed on R chest.    Current Pain level: 0/10.     Previous pain level was  3/10  .   Changes in function:  Yes (See Goal flowsheet attached for changes in current functional level)  Adverse reaction to treatment or activity: None    OBJECTIVE  Changes noted in objective findings:  Yes,   Objective: External rotation lag sign = +, ER = 1+-2/5 with trace to minimal activaiton, no pain     ASSESSMENT/PLAN  Updated problem list and treatment plan: Diagnosis 1:  S/p R RTC repair  Pain -  manual therapy, splint/taping/bracing/orthotics, self management, education, home program and neuro re-education   Decreased ROM/flexibility - manual therapy, therapeutic exercise, therapeutic activity, home program and neuro re-education   Decreased joint mobility - manual therapy, therapeutic exercise, therapeutic activity, home program and neuro  re-education   Decreased strength - therapeutic exercise, therapeutic activities, home program and neuro re-education   Decreased function - therapeutic activities, home program and neuro re-education   STG/LTGs have been met or progress has been made towards goals:  Yes (See Goal   Re: Maggy Goldberg   :   1960    flow sheet completed today.)  Assessment of Progress: The patient's progress has plateaued.  The patient's progress has slowed.  Self Management Plans:  Patient has been instructed in a home treatment program.  Patient  has been instructed in self management of symptoms.  I have re-evaluated this patient and find that the nature, scope, duration and intensity of the therapy is appropriate for the medical condition of the patient.  Maggy continues to require the following intervention to meet STG and LTG's:  PT    Recommendations:  This patient would benefit from continued therapy.     Frequency:  1 X week, once daily  Duration:  for 6 visits    Thank you for your referral.    INQUIRIES  Therapist: Angelica Khanna, PT, DPT  [facility] [address] [place] [address]  Phone: [phone]  Fax: [phone]

## 2022-11-07 ENCOUNTER — TRANSFERRED RECORDS (OUTPATIENT)
Dept: HEALTH INFORMATION MANAGEMENT | Facility: CLINIC | Age: 62
End: 2022-11-07

## 2022-11-08 ENCOUNTER — THERAPY VISIT (OUTPATIENT)
Dept: PHYSICAL THERAPY | Facility: CLINIC | Age: 62
End: 2022-11-08
Payer: COMMERCIAL

## 2022-11-08 DIAGNOSIS — Z47.89 AFTERCARE FOLLOWING SURGERY OF THE MUSCULOSKELETAL SYSTEM: ICD-10-CM

## 2022-11-08 DIAGNOSIS — M25.511 ACUTE PAIN OF RIGHT SHOULDER: Primary | ICD-10-CM

## 2022-11-08 PROCEDURE — 97110 THERAPEUTIC EXERCISES: CPT | Mod: GP | Performed by: PHYSICAL THERAPIST

## 2022-11-08 NOTE — PROGRESS NOTES
DISCHARGE REPORT    Progress reporting period is from 8/1/2022 to 11/8/2022.       SUBJECTIVE  Subjective changes noted by patient: Subjective: Patient states that she met with Dr. Navarrete. They performed an xray, and there was a definitive gap in R GH joint per patient report. Dr. Navarrete advised an MRI that patient will have this morning after PT. Surgery is most likely going to occur in the near future. In the mean time PT advised patient to continue maintaining range of motion with pain free exercises. Patient has chosed ot discharge from PT today with use of HEP. PT encouraged patient to reach out with any questions, and to schedule 2x per week as soon as she knows the plan moving forward.    Current pain level is Current Pain level: 0/10.     Previous pain level was   .   Changes in function:  Yes (See Goal flowsheet attached for changes in current functional level)  Adverse reaction to treatment or activity: None    OBJECTIVE  Changes noted in objective findings:  Yes,   Objective: ER lag sign = +, ABD = 100     ASSESSMENT/PLAN  Updated problem list and treatment plan: Diagnosis 1:  Signs and symptoms consistent with s/p R RTC repair  Pain -  home program  Decreased ROM/flexibility - home program  Decreased function - home program  STG/LTGs have been met or progress has been made towards goals:  Yes (See Goal flow sheet completed today.)  Assessment of Progress: The patient has had set backs in their progress.  Self Management Plans:  Patient has been instructed in a home treatment program.  Patient is independent in a home treatment program.  Patient  has been instructed in self management of symptoms.  Patient is independent in self management of symptoms.  I have re-evaluated this patient and find that the nature, scope, duration and intensity of the therapy is appropriate for the medical condition of the patient.  Mgagy continues to require the following intervention to meet STG and LTG's:  PT  intervention is no longer required to meet STG/LTG as patient is pursuing an MRI to see if a second surgery is required .    Recommendations:  This patient is ready to be discharged from therapy and continue their home treatment program. Patient to follow up with PT or MD as needed    Please refer to the daily flowsheet for treatment today, total treatment time and time spent performing 1:1 timed codes.

## 2022-11-11 ENCOUNTER — TRANSCRIBE ORDERS (OUTPATIENT)
Dept: OTHER | Age: 62
End: 2022-11-11

## 2022-11-11 DIAGNOSIS — M25.511 RIGHT SHOULDER PAIN: Primary | ICD-10-CM

## 2022-11-14 DIAGNOSIS — L40.9 PSORIASIS: ICD-10-CM

## 2022-11-16 ENCOUNTER — THERAPY VISIT (OUTPATIENT)
Dept: PHYSICAL THERAPY | Facility: CLINIC | Age: 62
End: 2022-11-16
Payer: COMMERCIAL

## 2022-11-16 DIAGNOSIS — Z47.89 AFTERCARE FOLLOWING SURGERY OF THE MUSCULOSKELETAL SYSTEM: ICD-10-CM

## 2022-11-16 DIAGNOSIS — M25.511 ACUTE PAIN OF RIGHT SHOULDER: Primary | ICD-10-CM

## 2022-11-16 PROCEDURE — 97110 THERAPEUTIC EXERCISES: CPT | Mod: GP

## 2022-11-18 NOTE — TELEPHONE ENCOUNTER
adalimumab (HUMIRA *CF* PEN) 40 MG/0.4ML pen kit      Last Written Prescription Date:  12-14-21  Last Fill Quantity: 6 e,   # refills: 1  Last Office Visit : 12-14-21 ( RTC 6 M)  Future Office visit:  none    Routing refill request to provider for review/approval because:  Med not on protocol  Gap in rf    Scheduling has been notified to contact the pt for appointment.

## 2022-11-19 RX ORDER — ADALIMUMAB 40MG/0.4ML
40 KIT SUBCUTANEOUS
Qty: 2 ML | Refills: 0 | Status: SHIPPED | OUTPATIENT
Start: 2022-11-19 | End: 2023-02-16

## 2022-11-30 ENCOUNTER — THERAPY VISIT (OUTPATIENT)
Dept: PHYSICAL THERAPY | Facility: CLINIC | Age: 62
End: 2022-11-30
Payer: COMMERCIAL

## 2022-11-30 DIAGNOSIS — M25.511 ACUTE PAIN OF RIGHT SHOULDER: Primary | ICD-10-CM

## 2022-11-30 DIAGNOSIS — Z47.89 AFTERCARE FOLLOWING SURGERY OF THE MUSCULOSKELETAL SYSTEM: ICD-10-CM

## 2022-11-30 PROCEDURE — 97110 THERAPEUTIC EXERCISES: CPT | Mod: GP

## 2022-11-30 NOTE — PROGRESS NOTES
PROGRESS  REPORT    Progress reporting period is from 11/8/22 to 11/30/22.       SUBJECTIVE  Subjective changes noted by patient: = Pt reports was told she cannot have a surgery, tendon not in good enough shape to repair. Goal is to strengthen what she can with the muscles she has. Last week at work was very busy, by the end of the day her shoulder was in quite a bit of pain.      Changes in function:  Yes (See Goal flowsheet attached for changes in current functional level)  Adverse reaction to treatment or activity: None    OBJECTIVE  Changes noted in objective findings:  See below.  Objective: AROM FE 93, ABD 76 - both w/ shrug. In supine  deg. Sidelying . Unable to resist in ER.     ASSESSMENT/PLAN  Updated problem list and treatment plan: Diagnosis 1:  R RCR  Pain -  manual therapy, splint/taping/bracing/orthotics, self management, education, directional preference exercise and home program  Decreased ROM/flexibility - manual therapy, therapeutic exercise, therapeutic activity and home program  Decreased strength - therapeutic exercise, therapeutic activities and home program  Decreased proprioception - neuro re-education, therapeutic activities and home program  Impaired muscle performance - neuro re-education and home program, electrical stimulation  Decreased function - therapeutic activities and home program  Impaired posture - neuro re-education, therapeutic activities and home program  STG/LTGs have been met or progress has been made towards goals:  Yes (See Goal flow sheet completed today.)  Assessment of Progress: The patient's condition is improving.  The patient's condition has potential to improve.  Self Management Plans:  Patient has been instructed in a home treatment program.  I have re-evaluated this patient and find that the nature, scope, duration and intensity of the therapy is appropriate for the medical condition of the patient.  Maggy continues to require the following  intervention to meet STG and LTG's:  PT    Recommendations:  This patient would benefit from continued therapy.     Frequency:  1 X week, once daily  Duration:  for 8 weeks        Please refer to the daily flowsheet for treatment today, total treatment time and time spent performing 1:1 timed codes.

## 2022-12-12 ENCOUNTER — THERAPY VISIT (OUTPATIENT)
Dept: PHYSICAL THERAPY | Facility: CLINIC | Age: 62
End: 2022-12-12
Payer: COMMERCIAL

## 2022-12-12 DIAGNOSIS — M25.511 ACUTE PAIN OF RIGHT SHOULDER: Primary | ICD-10-CM

## 2022-12-12 DIAGNOSIS — Z47.89 AFTERCARE FOLLOWING SURGERY OF THE MUSCULOSKELETAL SYSTEM: ICD-10-CM

## 2022-12-12 PROCEDURE — 97110 THERAPEUTIC EXERCISES: CPT | Mod: GP | Performed by: PHYSICAL THERAPIST

## 2022-12-12 PROCEDURE — 97112 NEUROMUSCULAR REEDUCATION: CPT | Mod: GP | Performed by: PHYSICAL THERAPIST

## 2022-12-19 ENCOUNTER — THERAPY VISIT (OUTPATIENT)
Dept: PHYSICAL THERAPY | Facility: CLINIC | Age: 62
End: 2022-12-19
Payer: COMMERCIAL

## 2022-12-19 DIAGNOSIS — Z47.89 AFTERCARE FOLLOWING SURGERY OF THE MUSCULOSKELETAL SYSTEM: ICD-10-CM

## 2022-12-19 DIAGNOSIS — M25.511 ACUTE PAIN OF RIGHT SHOULDER: Primary | ICD-10-CM

## 2022-12-19 PROCEDURE — 97110 THERAPEUTIC EXERCISES: CPT | Mod: GP | Performed by: PHYSICAL THERAPIST

## 2022-12-19 PROCEDURE — 97140 MANUAL THERAPY 1/> REGIONS: CPT | Mod: GP | Performed by: PHYSICAL THERAPIST

## 2022-12-19 PROCEDURE — 97112 NEUROMUSCULAR REEDUCATION: CPT | Mod: GP | Performed by: PHYSICAL THERAPIST

## 2022-12-26 ENCOUNTER — THERAPY VISIT (OUTPATIENT)
Dept: PHYSICAL THERAPY | Facility: CLINIC | Age: 62
End: 2022-12-26
Payer: COMMERCIAL

## 2022-12-26 DIAGNOSIS — Z47.89 AFTERCARE FOLLOWING SURGERY OF THE MUSCULOSKELETAL SYSTEM: ICD-10-CM

## 2022-12-26 DIAGNOSIS — M25.511 ACUTE PAIN OF RIGHT SHOULDER: Primary | ICD-10-CM

## 2022-12-26 PROCEDURE — 97110 THERAPEUTIC EXERCISES: CPT | Mod: GP | Performed by: PHYSICAL THERAPIST

## 2022-12-26 PROCEDURE — 97112 NEUROMUSCULAR REEDUCATION: CPT | Mod: GP | Performed by: PHYSICAL THERAPIST

## 2022-12-29 ENCOUNTER — THERAPY VISIT (OUTPATIENT)
Dept: PHYSICAL THERAPY | Facility: CLINIC | Age: 62
End: 2022-12-29
Payer: COMMERCIAL

## 2022-12-29 DIAGNOSIS — M25.511 ACUTE PAIN OF RIGHT SHOULDER: Primary | ICD-10-CM

## 2022-12-29 DIAGNOSIS — Z47.89 AFTERCARE FOLLOWING SURGERY OF THE MUSCULOSKELETAL SYSTEM: ICD-10-CM

## 2022-12-29 PROCEDURE — 97110 THERAPEUTIC EXERCISES: CPT | Mod: GP | Performed by: PHYSICAL THERAPIST

## 2023-01-10 ENCOUNTER — TELEPHONE (OUTPATIENT)
Dept: DERMATOLOGY | Facility: CLINIC | Age: 63
End: 2023-01-10

## 2023-01-10 NOTE — TELEPHONE ENCOUNTER
PA Initiation    Medication: Humira Renewal   Insurance Company: [employer] - Phone [phone] Fax [phone]  Pharmacy Filling the Rx: [facility] [address]  Filling Pharmacy Phone:    Filling Pharmacy Fax:    Start Date: 1/10/2023    Key: [id number])

## 2023-01-10 NOTE — TELEPHONE ENCOUNTER
Prior Authorization Approval    Authorization Effective Date: 12/11/2022  Authorization Expiration Date: 1/10/2024  Medication: Humira Renewal   Approved Dose/Quantity: 2 per 28 days  Reference #: Key: [id number]   Insurance Company: [employer] - Phone [phone] Fax [phone]  Expected CoPay:       CoPay Card Available:      Foundation Assistance Needed:    Which Pharmacy is filling the prescription (Not needed for infusion/clinic administered): [facility] - [address] [place] [address]  Pharmacy Notified: No  Patient Notified:

## 2023-02-12 DIAGNOSIS — L40.9 PSORIASIS: ICD-10-CM

## 2023-02-15 NOTE — TELEPHONE ENCOUNTER
HUMIRA 40 MG/0.4 ML PEN 2'S      Last Written Prescription Date:  11-19-22  Last Fill Quantity: 2 ml,   # refills: 0  Last Office Visit : 12-14-21 ( RTC 6 M)  Future Office visit:  none    Routing refill request to provider for review/approval because:  Med not on protocol    Scheduling has been notified to contact the pt for appointment.

## 2023-02-16 ENCOUNTER — TELEPHONE (OUTPATIENT)
Dept: DERMATOLOGY | Facility: CLINIC | Age: 63
End: 2023-02-16
Payer: COMMERCIAL

## 2023-02-16 RX ORDER — ADALIMUMAB 40MG/0.4ML
KIT SUBCUTANEOUS
Qty: 2 EACH | Refills: 0 | Status: SHIPPED | OUTPATIENT
Start: 2023-02-16 | End: 2023-03-07

## 2023-02-16 NOTE — TELEPHONE ENCOUNTER
Pt needs follow-up visit and labs  Okay to send one more refill but needs visit scheduled  No further refills without a visit    Clinic coordinators, okay to use [facility] to dr valentine, thank you for assistance

## 2023-02-16 NOTE — TELEPHONE ENCOUNTER
Francesco. 1st attempt informing patient to call [phone] to schedule a follow up with   in Dermatology.

## 2023-02-21 ENCOUNTER — TELEPHONE (OUTPATIENT)
Dept: DERMATOLOGY | Facility: CLINIC | Age: 63
End: 2023-02-21
Payer: COMMERCIAL

## 2023-02-21 NOTE — TELEPHONE ENCOUNTER
Writer called patient 2nd attempt to schedule follow up with Dr. Nelson in Dermatology. Writer had difficulty hearing patient, phone disconnected twice. (Okay to schedule pt in [facility] per Dr. Nelson.

## 2023-03-01 NOTE — PROGRESS NOTES
[facility] Health Dermatology Note  Encounter Date: Mar 7, 2023  Office Visit     Dermatology Problem List:   1. Psoriasis  -Current tx: Humira, desonide, clobetasol cream, protopic    2. Mucosal (labial) lentigo - lower lip - biopsy confirmed on 16  -NUB on hard palate deferred to oral pathology for biopsy ()   3. 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 Having a little flare because was out of Humira. Discussed option of increasing to weekly dosing if needed. Not currently needed. Continue today with no changes. 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  - Continue Humira, refilled today.   - Continue topicals prn  - Reviewed: Quant gold negative 21  - Repeat quant gold ordered today  - Advised COVID vaccination     # NUB on left nasal ala in setting of concurrent psoriasis v seborrheic dermatitis   - No concerning lesion present today; reviewed photo from 2021 and may be accentuated pore that had follicular plug within last visit, looks better today    Procedures Performed:   None    Follow-up: 1 year(s) in-person, or earlier for new or changing lesions    Staff and Scribe:     Scribe Disclosure:  I, Papa Mcconnell, am serving as a scribe to document services personally performed by Samaria Nelson MD based on data collection and the provider's statements to me.     Provider Disclosure:   The documentation recorded by the scribe accurately reflects the services I personally performed and the decisions made by me.    Samaria Nelson MD    Department of Dermatology  [facility]: Phone: [phone], Fax: [phone]  3/13/2023      ____________________________________________    CC: Derm Problem (Psoriasis - on Humira. Current flare due to being out of the Humira. )    HPI:  Ms. Maggy Goldberg is a(n) 63 year old female who presents today as a return patient for psoriasis follow-up. Last seen by me on 12/14/2021, at which time patient started trial topicals for treatment of concurrent psoriasis v seborrheic dermatitis.     Today, patient reports a current flare due to being out of Humira.     Patient is otherwise feeling well, without additional skin concerns.    Labs Reviewed:  Due for quant gold. Last was 7/9/21    Physical Exam:  Vitals: [facility] 10/19/2010   SKIN: Focused examination of the left lower leg and nose was performed.  - Pink plaques with silvery scale on right shin.   - Prior lesion of concern on left nasal ala today with accentuated pore, no follicular plug, and appears improved from prior.   - No other lesions of concern on areas examined.     Medications:  Current Outpatient Medications   Medication     adalimumab (HUMIRA *CF* PEN) 40 MG/0.4ML pen kit     ASPIRIN 325 MG OR TABS     buPROPion (WELLBUTRIN XL) 150 MG 24 hr tablet     buPROPion (WELLBUTRIN) 100 MG tablet     clonazePAM (KLONOPIN) 1 MG tablet     gabapentin (NEURONTIN) 300 MG capsule     lisinopril (ZESTRIL) 40 MG tablet     metoprolol tartrate (LOPRESSOR) 50 MG tablet     Sertraline HCl (ZOLOFT PO)     triamterene-HCTZ (DYAZIDE) 37.5-25 MG capsule     No current facility-administered medications for this visit.      Past Medical History:   Patient Active Problem List   Diagnosis     Adjustment disorder with mixed anxiety and depressed mood     Contact dermatitis and other eczema, due to unspecified cause     Allergic rhinitis     CARDIOVASCULAR SCREENING; LDL GOAL LESS THAN 100     CMC arthritis, thumb, degenerative     OCD (obsessive compulsive disorder)     Hypertension goal BP (blood pressure) < 140/90     Atypical nevi     Psoriasis     Hypercholesteremia      Lentigo     Plaque psoriasis     Lipoma of lower extremity, unspecified laterality     Achrochordon     Dermatofibroma     Skin cancer screening     Cervical segment dysfunction     Cervicalgia     Right shoulder pain, unspecified chronicity     Bicipital tendinitis of right shoulder     Impingement syndrome of right shoulder     Cervical cancer screening     Past Medical History:   Diagnosis Date     Adjustment disorder with mixed anxiety and depressed mood      CARDIOVASCULAR SCREENING; LDL GOAL LESS THAN 100 5/9/2010     CARDIOVASCULAR SCREENING; LDL GOAL LESS THAN 160      CMC arthritis, thumb, degenerative      Contact dermatitis and other eczema, due to unspecified cause     contact and exzema     HTN (hypertension)      Hypercholesteremia      Hypertension goal BP (blood pressure) < 140/90      Iritis 2010     OCD (obsessive compulsive disorder)      Psoriasis      Type II or unspecified type diabetes mellitus without mention of complication, not stated as uncontrolled      Uncomplicated type 2 diabetes mellitus (H) 10/14/2011        CC Referred Self, MD  No address on file on close of this encounter.

## 2023-03-06 PROBLEM — Z47.89 AFTERCARE FOLLOWING SURGERY OF THE MUSCULOSKELETAL SYSTEM: Status: RESOLVED | Noted: 2022-08-01 | Resolved: 2023-03-06

## 2023-03-06 PROBLEM — M25.511 ACUTE PAIN OF RIGHT SHOULDER: Status: RESOLVED | Noted: 2022-08-01 | Resolved: 2023-03-06

## 2023-03-06 NOTE — PROGRESS NOTES
Discharge Note    Progress reporting period is from initial evaluation date (please see noted date below) to Dec 29, 2022.  Linked Episodes   Type: Episode: Status: Noted: Resolved: Last update: Updated by:   PHYSICAL THERAPY s/p R RCR 8/1/22 Active 8/1/2022 12/29/2022  9:34 AM Tan Carrasco, PT      Comments:       Maggy failed to follow up and current status is unknown.  Please see information below for last relevant information on current status.  Patient seen for 15 visits.    SUBJECTIVE  Subjective changes noted by patient:  Patient states some exercises are feeling very easy, and feels ready for increased challenge. She would like to go on own for month of January and check in February for progress. PT agrees with plan  .  Current pain level is  .     Previous pain level was   .   Changes in function:  Yes (See Goal flowsheet attached for changes in current functional level)  Adverse reaction to treatment or activity: None    OBJECTIVE  Changes noted in objective findings: AROM: flexion = 50 prior to upper trap shrug, ABD = 20     ASSESSMENT/PLAN  Diagnosis: R RC repair (supra, infra), biceps tenodesis, bony acromioplasty   Updated problem list and treatment plan:   Pain - HEP  Decreased ROM/flexibility - HEP  Decreased function - HEP  Decreased strength - HEP  STG/LTGs have been met or progress has been made towards goals:  Yes, please see goal flowsheet for most current information  Assessment of Progress: current status is unknown.    Last current status: Pt is progressing as expected   Self Management Plans:  HEP  I have re-evaluated this patient and find that the nature, scope, duration and intensity of the therapy is appropriate for the medical condition of the patient.  Maggy continues to require the following intervention to meet STG and LTG's:  HEP.    Recommendations:  Discharge with current home program.  Patient to follow up with MD as needed.    Please refer to the daily flowsheet for treatment  today, total treatment time and time spent performing 1:1 timed codes.

## 2023-03-07 ENCOUNTER — LAB (OUTPATIENT)
Dept: LAB | Facility: CLINIC | Age: 63
End: 2023-03-07
Payer: COMMERCIAL

## 2023-03-07 ENCOUNTER — OFFICE VISIT (OUTPATIENT)
Dept: DERMATOLOGY | Facility: CLINIC | Age: 63
End: 2023-03-07
Payer: COMMERCIAL

## 2023-03-07 DIAGNOSIS — L40.9 PSORIASIS: ICD-10-CM

## 2023-03-07 DIAGNOSIS — L40.9 PSORIASIS: Primary | ICD-10-CM

## 2023-03-07 PROCEDURE — 99213 OFFICE O/P EST LOW 20 MIN: CPT | Performed by: DERMATOLOGY

## 2023-03-07 RX ORDER — ADALIMUMAB 40MG/0.4ML
KIT SUBCUTANEOUS
Qty: 2 EACH | Refills: 11 | Status: SHIPPED | OUTPATIENT
Start: 2023-03-07 | End: 2023-04-20

## 2023-03-07 ASSESSMENT — PAIN SCALES - GENERAL: PAINLEVEL: NO PAIN (0)

## 2023-03-07 NOTE — LETTER
3/7/2023       RE: Maggy Goldberg  [address]  [facility] [place] [address]     Dear Colleague,    Thank you for referring your patient, Maggy Goldberg, to the [facility] [place] at [facility]. Please see a copy of my visit note below.    [facility] Dermatology Note  Encounter Date: Mar 7, 2023  Office Visit     Dermatology Problem List:   1. Psoriasis  -Current tx: Humira, desonide, clobetasol cream, protopic    2. Mucosal (labial) lentigo - lower lip - biopsy confirmed on 16  -NUB on hard palate deferred to oral pathology for biopsy ()   3. 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 Having a little flare because was out of Humira. Discussed option of increasing to weekly dosing if needed. Not currently needed. Continue today with no changes. 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  - Continue Humira, refilled today.   - Continue topicals prn  - Reviewed: Quant gold negative 21  - Repeat quant gold ordered today  - Advised COVID vaccination     # NUB on left nasal ala in setting of concurrent psoriasis v seborrheic dermatitis   - No concerning lesion present today; reviewed photo from 2021 and may be accentuated pore that had follicular plug within last visit, looks better today    Procedures Performed:   None    Follow-up: 1 year(s) in-person, or earlier for new or changing lesions    Staff and Scribe:     Scribe Disclosure:  I, Papa Mcconnell, am serving as a scribe to document services personally performed by Samaria Nelson MD based on data collection and the provider's statements to me.     Provider Disclosure:   The documentation recorded by the  lisa accurately reflects the services I personally performed and the decisions made by me.    Samaria Nelson MD    Department of Dermatology  [facility]: Phone: [phone], Fax: [phone]  3/13/2023     ____________________________________________    CC: Derm Problem (Psoriasis - on Humira. Current flare due to being out of the Humira. )    HPI:  Ms. Maggy Goldberg is a(n) 63 year old female who presents today as a return patient for psoriasis follow-up. Last seen by me on 12/14/2021, at which time patient started trial topicals for treatment of concurrent psoriasis v seborrheic dermatitis.     Today, patient reports a current flare due to being out of Humira.     Patient is otherwise feeling well, without additional skin concerns.    Labs Reviewed:  Due for quant gold. Last was 7/9/21    Physical Exam:  Vitals: LMP 10/19/2010   SKIN: Focused examination of the left lower leg and nose was performed.  - Pink plaques with silvery scale on right shin.   - Prior lesion of concern on left nasal ala today with accentuated pore, no follicular plug, and appears improved from prior.   - No other lesions of concern on areas examined.     Medications:  Current Outpatient Medications   Medication     adalimumab (HUMIRA *CF* PEN) 40 MG/0.4ML pen kit     ASPIRIN 325 MG OR TABS     buPROPion (WELLBUTRIN XL) 150 MG 24 hr tablet     buPROPion (WELLBUTRIN) 100 MG tablet     clonazePAM (KLONOPIN) 1 MG tablet     gabapentin (NEURONTIN) 300 MG capsule     lisinopril (ZESTRIL) 40 MG tablet     metoprolol tartrate (LOPRESSOR) 50 MG tablet     Sertraline HCl (ZOLOFT PO)     triamterene-HCTZ (DYAZIDE) 37.5-25 MG capsule     No current facility-administered medications for this visit.      Past Medical History:   Patient Active Problem List   Diagnosis     Adjustment disorder with mixed anxiety and depressed mood     Contact dermatitis and  other eczema, due to unspecified cause     Allergic rhinitis     CARDIOVASCULAR SCREENING; LDL GOAL LESS THAN 100     CMC arthritis, thumb, degenerative     OCD (obsessive compulsive disorder)     Hypertension goal BP (blood pressure) < 140/90     Atypical nevi     Psoriasis     Hypercholesteremia     Lentigo     Plaque psoriasis     Lipoma of lower extremity, unspecified laterality     Achrochordon     Dermatofibroma     Skin cancer screening     Cervical segment dysfunction     Cervicalgia     Right shoulder pain, unspecified chronicity     Bicipital tendinitis of right shoulder     Impingement syndrome of right shoulder     Cervical cancer screening     Past Medical History:   Diagnosis Date     Adjustment disorder with mixed anxiety and depressed mood      CARDIOVASCULAR SCREENING; LDL GOAL LESS THAN 100 5/9/2010     CARDIOVASCULAR SCREENING; LDL GOAL LESS THAN 160      CMC arthritis, thumb, degenerative      Contact dermatitis and other eczema, due to unspecified cause     contact and exzema     HTN (hypertension)      Hypercholesteremia      Hypertension goal BP (blood pressure) < 140/90      Iritis 2010     OCD (obsessive compulsive disorder)      Psoriasis      Type II or unspecified type diabetes mellitus without mention of complication, not stated as uncontrolled      Uncomplicated type 2 diabetes mellitus (H) 10/14/2011        CC Referred Self, MD  No address on file on close of this encounter.

## 2023-03-07 NOTE — NURSING NOTE
Dermatology Rooming Note    Maggy Goldberg's goals for this visit include:   Chief Complaint   Patient presents with     Derm Problem     Psoriasis - on Humira. Current flare due to being out of the Humira.      Simona Nelson, CMA

## 2023-03-08 RX ORDER — ADALIMUMAB 40MG/0.4ML
KIT SUBCUTANEOUS
OUTPATIENT
Start: 2023-03-08

## 2023-03-15 ENCOUNTER — TELEPHONE (OUTPATIENT)
Dept: DERMATOLOGY | Facility: CLINIC | Age: 63
End: 2023-03-15

## 2023-03-15 NOTE — TELEPHONE ENCOUNTER
[facility] Call Center    Phone Message    May a detailed message be left on voicemail: yes     Reason for Call: Other: Pt states when she had her labs done they didn't take enough blood so she needs to have it drawn again. Pt would like to go to the [facility] lab to have it done. Please call Pt back to discuss if it's okay to use the [place] lab or if it has to be done at the [facility] [place] [place].     Also, pt states the pharmacy is not refilling her Humira because it's too early. Pt is requesting to go to weekly instead of every two weeks.     Please call Pt back to discuss. Thank you.     Action Taken: Message routed to:  [facility] ([facility] Derm    Travel Screening: Not Applicable

## 2023-03-17 NOTE — TELEPHONE ENCOUNTER
Called and verified patient by last name and . Informed patient that she can have labs drawn at any clinic but if they are not an [facility] lab then we need to fax orders to them. States [facility] and she will get them done there.     Asked patient why she wants to go weekly on Humira. Patient states she is not getting refills on time and her pharmacy won't fill medication as it is too soon. She states if they fill on time then she doesn't think she will need to convert to weekly dosing. States he most recent injection was 10 days ago and pharmacy has not given ETA on when she will get next delivery even though she is due in 4 days. Informed patient I would contact pharmacy to inquire as to why they won't deliver and then call her back. She states she would like pharmacy to call her instead of writer once issue is resolved.    Maximus Brothers, EMT

## 2023-03-17 NOTE — TELEPHONE ENCOUNTER
"Called [facility] pharmacy. They corrected \"issue\" on their end but did not elaborate on what this was. State they will contact patient to get Humira sent out as soon as possible.     Maximus Brothers, EMT    "

## 2023-03-21 ENCOUNTER — TELEPHONE (OUTPATIENT)
Dept: DERMATOLOGY | Facility: CLINIC | Age: 63
End: 2023-03-21
Payer: COMMERCIAL

## 2023-03-31 ENCOUNTER — LAB (OUTPATIENT)
Dept: LAB | Facility: CLINIC | Age: 63
End: 2023-03-31
Payer: COMMERCIAL

## 2023-03-31 ENCOUNTER — ANCILLARY PROCEDURE (OUTPATIENT)
Dept: GENERAL RADIOLOGY | Facility: CLINIC | Age: 63
End: 2023-03-31
Attending: INTERNAL MEDICINE
Payer: COMMERCIAL

## 2023-03-31 ENCOUNTER — OFFICE VISIT (OUTPATIENT)
Dept: URGENT CARE | Facility: URGENT CARE | Age: 63
End: 2023-03-31
Payer: COMMERCIAL

## 2023-03-31 VITALS
TEMPERATURE: 97 F | OXYGEN SATURATION: 98 % | DIASTOLIC BLOOD PRESSURE: 74 MMHG | SYSTOLIC BLOOD PRESSURE: 115 MMHG | HEART RATE: 75 BPM

## 2023-03-31 DIAGNOSIS — L40.9 PSORIASIS: ICD-10-CM

## 2023-03-31 DIAGNOSIS — S86.912A KNEE STRAIN, LEFT, INITIAL ENCOUNTER: ICD-10-CM

## 2023-03-31 DIAGNOSIS — M25.562 ACUTE PAIN OF LEFT KNEE: Primary | ICD-10-CM

## 2023-03-31 DIAGNOSIS — M25.562 ACUTE PAIN OF LEFT KNEE: ICD-10-CM

## 2023-03-31 PROCEDURE — 99214 OFFICE O/P EST MOD 30 MIN: CPT | Performed by: INTERNAL MEDICINE

## 2023-03-31 PROCEDURE — 86481 TB AG RESPONSE T-CELL SUSP: CPT

## 2023-03-31 PROCEDURE — 73562 X-RAY EXAM OF KNEE 3: CPT | Mod: TC | Performed by: RADIOLOGY

## 2023-03-31 PROCEDURE — 36415 COLL VENOUS BLD VENIPUNCTURE: CPT

## 2023-03-31 RX ORDER — NAPROXEN 500 MG/1
500 TABLET ORAL 2 TIMES DAILY WITH MEALS
Qty: 28 TABLET | Refills: 0 | Status: SHIPPED | OUTPATIENT
Start: 2023-03-31

## 2023-03-31 RX ORDER — NAPROXEN 500 MG/1
500 TABLET ORAL 2 TIMES DAILY WITH MEALS
Qty: 28 TABLET | Refills: 0 | Status: SHIPPED | OUTPATIENT
Start: 2023-03-31 | End: 2023-03-31

## 2023-03-31 NOTE — PROGRESS NOTES
ASSESSMENT AND PLAN:      ICD-10-CM    1. Acute pain of left knee  M25.562 XR Knee Left 3 Views     Knee Supplies Order Knee Sleeve/Brace; Left; Open     naproxen (NAPROSYN) 500 MG tablet     DISCONTINUED: naproxen (NAPROSYN) 500 MG tablet        No evidence of arthritis.  Knee joint exam stable with medial joint line tenderness  At this time will treat symptoms with knee sleeve and anti-inflammatory    Reviewed BMP.  Kidney function fine for naproxen    PLAN:      Patient Instructions       X-ray of knee -no arthritis or fracture noted.  Normal    Naproxen 500 mg twice daily with food over the next week  Knee sleeve    Ice to area.    Recheck with your clinic or see orthopedic knee specialist in the next few weeks if symptoms not resolved.        No follow-ups on file.        Courtney Ortega MD  [facility]    Subjective     Maggy Goldberg is a 63 year old who presents for Patient presents with:  Urgent Care  Trauma: Leg pain from and back x's 2 weeks no bruises or open wounds     an established patient of [facility].    Knee Pain    Onset of symptoms was 10 day(s) ago.  Location: left knee  Started in back of knee and now in the front of knee  No injury  No swelling  No bruising.  No increased activities  Bends knee fine  Walks for exercise.  Aggravating Factors: standing up  Therapies to improve symptoms include: ibuprofen  Past history: no prior back problems and         Review of Systems      Patient Active Problem List   Diagnosis     Adjustment disorder with mixed anxiety and depressed mood     Contact dermatitis and other eczema, due to unspecified cause     Allergic rhinitis     CARDIOVASCULAR SCREENING; LDL GOAL LESS THAN 100     CMC arthritis, thumb, degenerative     OCD (obsessive compulsive disorder)     Hypertension goal BP (blood pressure) < 140/90     Atypical nevi     Psoriasis     Hypercholesteremia     Lentigo     Plaque psoriasis     Lipoma of lower extremity, unspecified  laterality     Achrochordon     Dermatofibroma     Skin cancer screening     Cervical segment dysfunction     Cervicalgia     Right shoulder pain, unspecified chronicity     Bicipital tendinitis of right shoulder     Impingement syndrome of right shoulder     Cervical cancer screening     Component      Latest Ref Rng & Units 7/1/2022   Sodium      133 - 144 mmol/L 140   Potassium      3.4 - 5.3 mmol/L 3.7   Chloride      94 - 109 mmol/L 110 (H)   Carbon Dioxide      20 - 32 mmol/L 22   Anion Gap      3 - 14 mmol/L 8   Urea Nitrogen      7 - 30 mg/dL 20   Creatinine      0.52 - 1.04 mg/dL 0.85   Calcium      8.5 - 10.1 mg/dL 9.1   Glucose      70 - 99 mg/dL 116 (H)   GFR Estimate      >60 mL/min/1.73m2 77           Objective    /74   Pulse 75   Temp 97  F (36.1  C) (Temporal)   LMP 10/19/2010   SpO2 98%   Physical Exam  Vitals reviewed.   Constitutional:       Appearance: Normal appearance.   Musculoskeletal:      Comments: bilateral knees symmetrical from posterior  left knee  nontender popliteal fossa  Tenderness medial joint line.  Also with varus compression  Questionable tenderness in area at anserine bursa    Knee joint stable.  No effusion.  Knee exam otherwise normal   Skin:     Findings: No bruising or rash.   Neurological:      Mental Status: She is alert.            Xray - Reviewed and interpreted by me.  No evidence of arthritis.  No fracture noted.

## 2023-03-31 NOTE — PATIENT INSTRUCTIONS
X-ray of knee -no arthritis or fracture noted.  Normal    Naproxen 500 mg twice daily with food over the next week  Knee sleeve    Ice to area.    Recheck with your clinic or see orthopedic knee specialist in the next few weeks if symptoms not resolved.

## 2023-04-03 LAB
GAMMA INTERFERON BACKGROUND BLD IA-ACNC: 0.03 IU/ML
M TB IFN-G BLD-IMP: NEGATIVE
M TB IFN-G CD4+ BCKGRND COR BLD-ACNC: 9.97 IU/ML
MITOGEN IGNF BCKGRD COR BLD-ACNC: 0.01 IU/ML
MITOGEN IGNF BCKGRD COR BLD-ACNC: 0.02 IU/ML
QUANTIFERON MITOGEN: 10 IU/ML
QUANTIFERON NIL TUBE: 0.03 IU/ML
QUANTIFERON TB1 TUBE: 0.05 IU/ML
QUANTIFERON TB2 TUBE: 0.04

## 2023-04-07 ENCOUNTER — TRANSFERRED RECORDS (OUTPATIENT)
Dept: HEALTH INFORMATION MANAGEMENT | Facility: CLINIC | Age: 63
End: 2023-04-07
Payer: COMMERCIAL

## 2023-04-12 ENCOUNTER — TRANSFERRED RECORDS (OUTPATIENT)
Dept: HEALTH INFORMATION MANAGEMENT | Facility: CLINIC | Age: 63
End: 2023-04-12
Payer: COMMERCIAL

## 2023-04-13 ENCOUNTER — DOCUMENTATION ONLY (OUTPATIENT)
Dept: HOME HEALTH SERVICES | Facility: CLINIC | Age: 63
End: 2023-04-13
Payer: COMMERCIAL

## 2023-05-26 ENCOUNTER — TELEPHONE (OUTPATIENT)
Dept: FAMILY MEDICINE | Facility: CLINIC | Age: 63
End: 2023-05-26
Payer: COMMERCIAL

## 2023-05-26 ENCOUNTER — OFFICE VISIT (OUTPATIENT)
Dept: URGENT CARE | Facility: URGENT CARE | Age: 63
End: 2023-05-26
Payer: COMMERCIAL

## 2023-05-26 VITALS
BODY MASS INDEX: 31.3 KG/M2 | DIASTOLIC BLOOD PRESSURE: 86 MMHG | TEMPERATURE: 97.4 F | HEART RATE: 55 BPM | SYSTOLIC BLOOD PRESSURE: 136 MMHG | WEIGHT: 180 LBS | OXYGEN SATURATION: 99 % | RESPIRATION RATE: 18 BRPM

## 2023-05-26 DIAGNOSIS — S81.811A LACERATION OF RIGHT LOWER EXTREMITY, INITIAL ENCOUNTER: Primary | ICD-10-CM

## 2023-05-26 PROCEDURE — 90714 TD VACC NO PRESV 7 YRS+ IM: CPT | Performed by: EMERGENCY MEDICINE

## 2023-05-26 PROCEDURE — 90471 IMMUNIZATION ADMIN: CPT | Performed by: EMERGENCY MEDICINE

## 2023-05-26 PROCEDURE — 99212 OFFICE O/P EST SF 10 MIN: CPT | Mod: 25 | Performed by: EMERGENCY MEDICINE

## 2023-05-26 NOTE — TELEPHONE ENCOUNTER
Pt just scratched her leg on a old metal fence.  When was her last tentanus shot- pt asks.   6/22/2015.  Pt should get an updated Tetanus shot for this injury.  Pt will go to  to get this.  TASHA Ghosh

## 2023-05-26 NOTE — PROGRESS NOTES
Assessment & Plan     Diagnosis:  (S85.159K) Laceration of right lower extremity, initial encounter  (primary encounter diagnosis)    Medical Decision Making:  The patient presented with a laceration to the right leg.  The wound was carefully evaluated and explored.  The laceration is very superficial and not requiring closure, there is no active bleeding and it seems to be healing already.  Patient notes that this was a very camille fence that she was scraped on, tetanus is 8 years old and we discussed it be reasonable to update this today.  Tetanus status addressed.  There is no evidence of muscular, tendon, or bony damage with this laceration.  No signs of foreign body.  Possible complications (infection, scarring) were reviewed with the patient. Return to  or follow up with PCP with concerns for infection or active bleeding.    Humberto Sher PA-C  [facility]    Subjective     Maggy Goldberg is a 63 year old female who presents to clinic today for the following health issues:  Chief Complaint   Patient presents with     Urgent Care     Laceration     Cut by old wired fence on Rt leg 30 minutes ago.        HPI  Patient states that she was cut by a very camille old wire fence about 30 minutes on the right leg, scratch seems to be superficial but she is concerned about tetanus that she knows she is due for one very soon.  She denies any active bleeding at this time, possible foreign body, red streaks going up the leg other concerns    Review of Systems    See HPI    Objective      Vitals: /86   Pulse 55   Temp 97.4  F (36.3  C) (Temporal)   Resp 18   Wt 81.6 kg (180 lb)   LMP 10/19/2010   SpO2 99%   BMI 31.30 kg/m        Patient Vitals for the past 24 hrs:   BP Temp Temp src Pulse Resp SpO2 Weight   05/26/23 1418 136/86 97.4  F (36.3  C) Temporal 55 18 99 % 81.6 kg (180 lb)       Vital signs reviewed by: Humberto Sher PA-C    Physical Exam   Constitutional: Patient is alert and  cooperative. No acute distress.  Neurological: Alert and oriented x3.   MSK/Skin: Right lateral leg with a long, ~15cm superficial scape to the right lateral leg; blood is dried. No active bleeding. No open wounds, foreign body, erythema, lymphangitis or swelling.  Psychiatric:The patient has a normal mood and affect.       Interventions:  TD given ALLEGRA Sher PA-C, May 26, 2023

## 2023-07-05 ENCOUNTER — PATIENT OUTREACH (OUTPATIENT)
Dept: CARE COORDINATION | Facility: CLINIC | Age: 63
End: 2023-07-05
Payer: COMMERCIAL

## 2023-07-14 DIAGNOSIS — I10 HYPERTENSION GOAL BP (BLOOD PRESSURE) < 140/90: ICD-10-CM

## 2023-07-14 DIAGNOSIS — E87.6 HYPOPOTASSEMIA: ICD-10-CM

## 2023-07-17 RX ORDER — TRIAMTERENE AND HYDROCHLOROTHIAZIDE 37.5; 25 MG/1; MG/1
CAPSULE ORAL
Qty: 90 CAPSULE | Refills: 0 | Status: SHIPPED | OUTPATIENT
Start: 2023-07-17

## 2023-07-17 RX ORDER — LISINOPRIL 40 MG/1
TABLET ORAL
Qty: 90 TABLET | Refills: 0 | Status: SHIPPED | OUTPATIENT
Start: 2023-07-17

## 2023-07-17 NOTE — TELEPHONE ENCOUNTER
"Routing refill request to provider for review/approval because:  Patient needs to be seen because it has been more than 1 year since last office visit.    Last Written Prescription Date:  5/23/22  Last Fill Quantity: 90,  # refills: 3   Last office visit provider:  7/1/22     Requested Prescriptions   Pending Prescriptions Disp Refills     triamterene-HCTZ (DYAZIDE) 37.5-25 MG capsule [Pharmacy Med Name: TRIAMTERENE-HCTZ 37.5-25MG CAPS] 90 capsule 3     Sig: TAKE ONE CAPSULE BY MOUTH ONCE DAILY       Diuretics (Including Combos) Protocol Failed - 7/14/2023  8:16 AM        Failed - Recent (12 mo) or future (30 days) visit within the authorizing provider's specialty     Patient has had an office visit with the authorizing provider or a provider within the authorizing providers department within the previous 12 mos or has a future within next 30 days. See \"Patient Info\" tab in inbasket, or \"Choose Columns\" in Meds & Orders section of the refill encounter.              Failed - Normal serum creatinine on file in past 12 months     Recent Labs   Lab Test 07/01/22  1427   CR 0.85              Failed - Normal serum potassium on file in past 12 months     Recent Labs   Lab Test 07/01/22  1427   POTASSIUM 3.7                    Failed - Normal serum sodium on file in past 12 months     Recent Labs   Lab Test 07/01/22  1427                 Passed - Blood pressure under 140/90 in past 12 months     BP Readings from Last 3 Encounters:   05/26/23 136/86   03/31/23 115/74   07/01/22 100/58                 Passed - Medication is active on med list        Passed - Patient is age 18 or older        Passed - No active pregancy on record        Passed - No positive pregnancy test in past 12 months           lisinopril (ZESTRIL) 40 MG tablet [Pharmacy Med Name: LISINOPRIL 40MG TABS] 90 tablet 3     Sig: TAKE ONE TABLET BY MOUTH ONCE DAILY       ACE Inhibitors (Including Combos) Protocol Failed - 7/14/2023  8:16 AM        Failed - " "Recent (12 mo) or future (30 days) visit within the authorizing provider's specialty     Patient has had an office visit with the authorizing provider or a provider within the authorizing providers department within the previous 12 mos or has a future within next 30 days. See \"Patient Info\" tab in inbasket, or \"Choose Columns\" in Meds & Orders section of the refill encounter.              Failed - Normal serum creatinine on file in past 12 months     Recent Labs   Lab Test 07/01/22  1427   CR 0.85       Ok to refill medication if creatinine is low          Failed - Normal serum potassium on file in past 12 months     Recent Labs   Lab Test 07/01/22  1427   POTASSIUM 3.7             Passed - Blood pressure under 140/90 in past 12 months     BP Readings from Last 3 Encounters:   05/26/23 136/86   03/31/23 115/74   07/01/22 100/58                 Passed - Medication is active on med list        Passed - Patient is age 18 or older        Passed - No active pregnancy on record        Passed - No positive pregnancy test within past 12 months             Christina England, RN 07/16/23 7:59 PM  "

## 2023-08-02 ENCOUNTER — PATIENT OUTREACH (OUTPATIENT)
Dept: CARE COORDINATION | Facility: CLINIC | Age: 63
End: 2023-08-02
Payer: COMMERCIAL

## 2023-09-19 ENCOUNTER — TELEPHONE (OUTPATIENT)
Dept: FAMILY MEDICINE | Facility: CLINIC | Age: 63
End: 2023-09-19
Payer: COMMERCIAL

## 2023-09-19 NOTE — TELEPHONE ENCOUNTER
Dr Rodriguez,    [facility] only    Dr Frandy Gonzales calling from ICU [facility] in [place] for information on pt. Pt was on a vacation headed for [place] and got sick. Dr Gonzales needs medical information about meds and allergies .         Pt in septic shock and ARDS, intubated, cannot respond. She got para influenza virus and then superimposed MS Staph pneumonia    Allergies relayed to caller with the reactions that are noted as well as information on her med list. Dr Gonzales also told pt has not been seen here for over one year so our med list may not be current if she has been seen elsewhere.    She did feel the med list did match what family told her.     Karen Baugh, RN, BSN  [facility]

## 2024-10-31 DIAGNOSIS — Z12.11 COLON CANCER SCREENING: ICD-10-CM

## 2024-11-14 ENCOUNTER — ORDERS ONLY (AUTO-RELEASED) (OUTPATIENT)
Dept: FAMILY MEDICINE | Facility: CLINIC | Age: 64
End: 2024-11-14
Payer: COMMERCIAL

## 2024-11-14 DIAGNOSIS — Z12.11 COLON CANCER SCREENING: ICD-10-CM
